# Patient Record
Sex: FEMALE | Race: WHITE | Employment: OTHER | URBAN - METROPOLITAN AREA
[De-identification: names, ages, dates, MRNs, and addresses within clinical notes are randomized per-mention and may not be internally consistent; named-entity substitution may affect disease eponyms.]

---

## 2017-02-07 ENCOUNTER — ALLIED HEALTH/NURSE VISIT (OUTPATIENT)
Dept: CARDIOLOGY | Facility: CLINIC | Age: 82
End: 2017-02-07
Payer: COMMERCIAL

## 2017-02-07 DIAGNOSIS — Z95.0 CARDIAC PACEMAKER IN SITU: Primary | ICD-10-CM

## 2017-02-07 PROCEDURE — 93293 PM PHONE R-STRIP DEVICE EVAL: CPT | Performed by: INTERNAL MEDICINE

## 2017-02-07 NOTE — PROGRESS NOTES
~90 day phone teletrace  Intrinsic Rhythm at time of check. Magnet response WNL.  F/U scheduled q 3 months. GISELE MerinoT

## 2017-03-03 ENCOUNTER — HOSPITAL ENCOUNTER (INPATIENT)
Facility: CLINIC | Age: 82
LOS: 3 days | Discharge: NURSING, REHAB OR LONG-TERM CARE | DRG: 388 | End: 2017-03-06
Attending: EMERGENCY MEDICINE | Admitting: ORTHOPAEDIC SURGERY
Payer: COMMERCIAL

## 2017-03-03 ENCOUNTER — APPOINTMENT (OUTPATIENT)
Dept: CARDIOLOGY | Facility: CLINIC | Age: 82
DRG: 388 | End: 2017-03-03
Attending: EMERGENCY MEDICINE
Payer: COMMERCIAL

## 2017-03-03 ENCOUNTER — APPOINTMENT (OUTPATIENT)
Dept: CT IMAGING | Facility: CLINIC | Age: 82
DRG: 388 | End: 2017-03-03
Attending: EMERGENCY MEDICINE
Payer: COMMERCIAL

## 2017-03-03 DIAGNOSIS — I10 HYPERTENSION GOAL BP (BLOOD PRESSURE) < 140/90: ICD-10-CM

## 2017-03-03 DIAGNOSIS — K63.89 COLON DISTENTION: ICD-10-CM

## 2017-03-03 DIAGNOSIS — R41.0 DELIRIUM: ICD-10-CM

## 2017-03-03 DIAGNOSIS — K59.01 SLOW TRANSIT CONSTIPATION: ICD-10-CM

## 2017-03-03 DIAGNOSIS — K56.7 ILEUS (H): ICD-10-CM

## 2017-03-03 DIAGNOSIS — S06.4XAA EPIDURAL HEMATOMA (H): ICD-10-CM

## 2017-03-03 DIAGNOSIS — K59.00 CONSTIPATION: ICD-10-CM

## 2017-03-03 DIAGNOSIS — Z95.0 CARDIAC PACEMAKER IN SITU: Primary | ICD-10-CM

## 2017-03-03 DIAGNOSIS — N39.0 URINARY TRACT INFECTION WITHOUT HEMATURIA, SITE UNSPECIFIED: ICD-10-CM

## 2017-03-03 LAB
ALBUMIN SERPL-MCNC: 3.1 G/DL (ref 3.4–5)
ALBUMIN UR-MCNC: 100 MG/DL
ALP SERPL-CCNC: 88 U/L (ref 40–150)
ALT SERPL W P-5'-P-CCNC: 7 U/L (ref 0–50)
AMMONIA PLAS-SCNC: 19 UMOL/L (ref 10–50)
AMMONIA PLAS-SCNC: NORMAL UMOL/L (ref 10–50)
ANION GAP SERPL CALCULATED.3IONS-SCNC: 12 MMOL/L (ref 3–14)
APPEARANCE UR: CLEAR
AST SERPL W P-5'-P-CCNC: 13 U/L (ref 0–45)
AST SERPL W P-5'-P-CCNC: ABNORMAL U/L (ref 0–45)
BACTERIA #/AREA URNS HPF: ABNORMAL /HPF
BASOPHILS # BLD AUTO: 0 10E9/L (ref 0–0.2)
BASOPHILS NFR BLD AUTO: 0.1 %
BILIRUB SERPL-MCNC: 0.6 MG/DL (ref 0.2–1.3)
BILIRUB UR QL STRIP: NEGATIVE
BUN SERPL-MCNC: 16 MG/DL (ref 7–30)
C DIFF TOX B STL QL: NORMAL
CALCIUM SERPL-MCNC: 8.4 MG/DL (ref 8.5–10.1)
CHLORIDE SERPL-SCNC: 103 MMOL/L (ref 94–109)
CO2 BLDCOV-SCNC: 25 MMOL/L (ref 21–28)
CO2 SERPL-SCNC: 24 MMOL/L (ref 20–32)
COLOR UR AUTO: YELLOW
CREAT BLD-MCNC: 0.5 MG/DL (ref 0.52–1.04)
CREAT SERPL-MCNC: 0.51 MG/DL (ref 0.52–1.04)
DIFFERENTIAL METHOD BLD: ABNORMAL
EOSINOPHIL # BLD AUTO: 0 10E9/L (ref 0–0.7)
EOSINOPHIL NFR BLD AUTO: 0 %
ERYTHROCYTE [DISTWIDTH] IN BLOOD BY AUTOMATED COUNT: 14.6 % (ref 10–15)
GFR SERPL CREATININE-BSD FRML MDRD: >90 ML/MIN/1.7M2
GFR SERPL CREATININE-BSD FRML MDRD: ABNORMAL ML/MIN/1.7M2
GLUCOSE SERPL-MCNC: 146 MG/DL (ref 70–99)
GLUCOSE UR STRIP-MCNC: NEGATIVE MG/DL
HCT VFR BLD AUTO: 45.5 % (ref 35–47)
HGB BLD-MCNC: 14.7 G/DL (ref 11.7–15.7)
HGB UR QL STRIP: NEGATIVE
IMM GRANULOCYTES # BLD: 0.1 10E9/L (ref 0–0.4)
IMM GRANULOCYTES NFR BLD: 0.4 %
INR PPP: 1.11 (ref 0.86–1.14)
KETONES UR STRIP-MCNC: 5 MG/DL
LACTATE BLD-SCNC: 1.7 MMOL/L (ref 0.7–2.1)
LACTATE BLD-SCNC: 2.5 MMOL/L (ref 0.7–2.1)
LEUKOCYTE ESTERASE UR QL STRIP: ABNORMAL
LIPASE SERPL-CCNC: 55 U/L (ref 73–393)
LYMPHOCYTES # BLD AUTO: 1 10E9/L (ref 0.8–5.3)
LYMPHOCYTES NFR BLD AUTO: 8.5 %
MCH RBC QN AUTO: 31.5 PG (ref 26.5–33)
MCHC RBC AUTO-ENTMCNC: 32.3 G/DL (ref 31.5–36.5)
MCV RBC AUTO: 98 FL (ref 78–100)
MONOCYTES # BLD AUTO: 0.3 10E9/L (ref 0–1.3)
MONOCYTES NFR BLD AUTO: 2.5 %
MUCOUS THREADS #/AREA URNS LPF: PRESENT /LPF
NEUTROPHILS # BLD AUTO: 10.6 10E9/L (ref 1.6–8.3)
NEUTROPHILS NFR BLD AUTO: 88.5 %
NITRATE UR QL: POSITIVE
NRBC # BLD AUTO: 0 10*3/UL
NRBC BLD AUTO-RTO: 0 /100
NT-PROBNP SERPL-MCNC: ABNORMAL PG/ML (ref 0–1800)
PCO2 BLDV: 33 MM HG (ref 40–50)
PH BLDV: 7.48 PH (ref 7.32–7.43)
PH UR STRIP: 5.5 PH (ref 5–7)
PLATELET # BLD AUTO: 263 10E9/L (ref 150–450)
PO2 BLDV: 53 MM HG (ref 25–47)
POTASSIUM SERPL-SCNC: 4.1 MMOL/L (ref 3.4–5.3)
POTASSIUM SERPL-SCNC: 5 MMOL/L (ref 3.4–5.3)
PROCALCITONIN SERPL-MCNC: NORMAL NG/ML
PROT SERPL-MCNC: 6.8 G/DL (ref 6.8–8.8)
RADIOLOGIST FLAGS: ABNORMAL
RBC # BLD AUTO: 4.66 10E12/L (ref 3.8–5.2)
RBC #/AREA URNS AUTO: 2 /HPF (ref 0–2)
SAO2 % BLDV FROM PO2: 90 %
SODIUM SERPL-SCNC: 140 MMOL/L (ref 133–144)
SP GR UR STRIP: >1.05 (ref 1–1.03)
SPECIMEN SOURCE: NORMAL
TROPONIN I SERPL-MCNC: 0.76 UG/L (ref 0–0.04)
TROPONIN I SERPL-MCNC: 0.93 UG/L (ref 0–0.04)
TROPONIN I SERPL-MCNC: 0.94 UG/L (ref 0–0.04)
TROPONIN I SERPL-MCNC: 0.96 UG/L (ref 0–0.04)
TSH SERPL DL<=0.05 MIU/L-ACNC: 0.83 MU/L (ref 0.4–4)
URN SPEC COLLECT METH UR: ABNORMAL
UROBILINOGEN UR STRIP-MCNC: NORMAL MG/DL (ref 0–2)
WBC # BLD AUTO: 11.9 10E9/L (ref 4–11)
WBC #/AREA URNS AUTO: 5 /HPF (ref 0–2)

## 2017-03-03 PROCEDURE — 99223 1ST HOSP IP/OBS HIGH 75: CPT | Mod: AI | Performed by: ORTHOPAEDIC SURGERY

## 2017-03-03 PROCEDURE — 25000128 H RX IP 250 OP 636: Performed by: PHYSICIAN ASSISTANT

## 2017-03-03 PROCEDURE — 82803 BLOOD GASES ANY COMBINATION: CPT

## 2017-03-03 PROCEDURE — 99285 EMERGENCY DEPT VISIT HI MDM: CPT | Mod: 25

## 2017-03-03 PROCEDURE — 82565 ASSAY OF CREATININE: CPT

## 2017-03-03 PROCEDURE — 93010 ELECTROCARDIOGRAM REPORT: CPT | Mod: Z6 | Performed by: EMERGENCY MEDICINE

## 2017-03-03 PROCEDURE — 74177 CT ABD & PELVIS W/CONTRAST: CPT

## 2017-03-03 PROCEDURE — 80053 COMPREHEN METABOLIC PANEL: CPT | Performed by: EMERGENCY MEDICINE

## 2017-03-03 PROCEDURE — 99285 EMERGENCY DEPT VISIT HI MDM: CPT | Mod: 25 | Performed by: EMERGENCY MEDICINE

## 2017-03-03 PROCEDURE — 96375 TX/PRO/DX INJ NEW DRUG ADDON: CPT

## 2017-03-03 PROCEDURE — 84484 ASSAY OF TROPONIN QUANT: CPT | Performed by: PHYSICIAN ASSISTANT

## 2017-03-03 PROCEDURE — 99207 ZZC APP CREDIT; MD BILLING SHARED VISIT: CPT | Performed by: PHYSICIAN ASSISTANT

## 2017-03-03 PROCEDURE — 83880 ASSAY OF NATRIURETIC PEPTIDE: CPT | Performed by: EMERGENCY MEDICINE

## 2017-03-03 PROCEDURE — 81001 URINALYSIS AUTO W/SCOPE: CPT | Performed by: EMERGENCY MEDICINE

## 2017-03-03 PROCEDURE — 83690 ASSAY OF LIPASE: CPT | Performed by: EMERGENCY MEDICINE

## 2017-03-03 PROCEDURE — 25500064 ZZH RX 255 OP 636: Performed by: STUDENT IN AN ORGANIZED HEALTH CARE EDUCATION/TRAINING PROGRAM

## 2017-03-03 PROCEDURE — 96361 HYDRATE IV INFUSION ADD-ON: CPT

## 2017-03-03 PROCEDURE — 84443 ASSAY THYROID STIM HORMONE: CPT | Performed by: EMERGENCY MEDICINE

## 2017-03-03 PROCEDURE — 84145 PROCALCITONIN (PCT): CPT | Performed by: EMERGENCY MEDICINE

## 2017-03-03 PROCEDURE — 93005 ELECTROCARDIOGRAM TRACING: CPT

## 2017-03-03 PROCEDURE — 71260 CT THORAX DX C+: CPT

## 2017-03-03 PROCEDURE — 87086 URINE CULTURE/COLONY COUNT: CPT | Performed by: EMERGENCY MEDICINE

## 2017-03-03 PROCEDURE — 84450 TRANSFERASE (AST) (SGOT): CPT | Performed by: EMERGENCY MEDICINE

## 2017-03-03 PROCEDURE — 87040 BLOOD CULTURE FOR BACTERIA: CPT | Performed by: EMERGENCY MEDICINE

## 2017-03-03 PROCEDURE — 84132 ASSAY OF SERUM POTASSIUM: CPT | Performed by: EMERGENCY MEDICINE

## 2017-03-03 PROCEDURE — 83605 ASSAY OF LACTIC ACID: CPT

## 2017-03-03 PROCEDURE — 99222 1ST HOSP IP/OBS MODERATE 55: CPT | Mod: 25 | Performed by: INTERNAL MEDICINE

## 2017-03-03 PROCEDURE — 84484 ASSAY OF TROPONIN QUANT: CPT | Performed by: EMERGENCY MEDICINE

## 2017-03-03 PROCEDURE — 82140 ASSAY OF AMMONIA: CPT | Performed by: EMERGENCY MEDICINE

## 2017-03-03 PROCEDURE — 25000128 H RX IP 250 OP 636: Performed by: EMERGENCY MEDICINE

## 2017-03-03 PROCEDURE — 85025 COMPLETE CBC W/AUTO DIFF WBC: CPT | Performed by: EMERGENCY MEDICINE

## 2017-03-03 PROCEDURE — 85610 PROTHROMBIN TIME: CPT | Performed by: EMERGENCY MEDICINE

## 2017-03-03 PROCEDURE — 87088 URINE BACTERIA CULTURE: CPT | Performed by: EMERGENCY MEDICINE

## 2017-03-03 PROCEDURE — 83605 ASSAY OF LACTIC ACID: CPT | Performed by: EMERGENCY MEDICINE

## 2017-03-03 PROCEDURE — 12000006 ZZH R&B IMCU INTERMEDIATE UMMC

## 2017-03-03 PROCEDURE — 25500064 ZZH RX 255 OP 636: Performed by: EMERGENCY MEDICINE

## 2017-03-03 PROCEDURE — 93306 TTE W/DOPPLER COMPLETE: CPT | Mod: 26 | Performed by: INTERNAL MEDICINE

## 2017-03-03 PROCEDURE — 40000264 ECHO COMPLETE WITH LUMASON

## 2017-03-03 PROCEDURE — 96365 THER/PROPH/DIAG IV INF INIT: CPT

## 2017-03-03 PROCEDURE — 36415 COLL VENOUS BLD VENIPUNCTURE: CPT | Performed by: PHYSICIAN ASSISTANT

## 2017-03-03 PROCEDURE — 87493 C DIFF AMPLIFIED PROBE: CPT | Performed by: EMERGENCY MEDICINE

## 2017-03-03 PROCEDURE — 87186 SC STD MICRODIL/AGAR DIL: CPT | Performed by: EMERGENCY MEDICINE

## 2017-03-03 PROCEDURE — 93010 ELECTROCARDIOGRAM REPORT: CPT | Mod: 77 | Performed by: INTERNAL MEDICINE

## 2017-03-03 PROCEDURE — 70450 CT HEAD/BRAIN W/O DYE: CPT

## 2017-03-03 RX ORDER — POTASSIUM CHLORIDE 1.5 G/1.58G
20-40 POWDER, FOR SOLUTION ORAL
Status: DISCONTINUED | OUTPATIENT
Start: 2017-03-03 | End: 2017-03-06 | Stop reason: HOSPADM

## 2017-03-03 RX ORDER — POTASSIUM CHLORIDE 29.8 MG/ML
20 INJECTION INTRAVENOUS
Status: DISCONTINUED | OUTPATIENT
Start: 2017-03-03 | End: 2017-03-06 | Stop reason: HOSPADM

## 2017-03-03 RX ORDER — ACETAMINOPHEN 500 MG
1000 TABLET ORAL 3 TIMES DAILY
Status: DISCONTINUED | OUTPATIENT
Start: 2017-03-03 | End: 2017-03-06 | Stop reason: HOSPADM

## 2017-03-03 RX ORDER — PROCHLORPERAZINE 25 MG
12.5 SUPPOSITORY, RECTAL RECTAL EVERY 12 HOURS PRN
Status: DISCONTINUED | OUTPATIENT
Start: 2017-03-03 | End: 2017-03-06 | Stop reason: HOSPADM

## 2017-03-03 RX ORDER — ONDANSETRON 4 MG/1
4 TABLET, ORALLY DISINTEGRATING ORAL EVERY 6 HOURS PRN
Status: DISCONTINUED | OUTPATIENT
Start: 2017-03-03 | End: 2017-03-06 | Stop reason: HOSPADM

## 2017-03-03 RX ORDER — IOPAMIDOL 755 MG/ML
123 INJECTION, SOLUTION INTRAVASCULAR ONCE
Status: COMPLETED | OUTPATIENT
Start: 2017-03-03 | End: 2017-03-03

## 2017-03-03 RX ORDER — POTASSIUM CHLORIDE 750 MG/1
20-40 TABLET, EXTENDED RELEASE ORAL
Status: DISCONTINUED | OUTPATIENT
Start: 2017-03-03 | End: 2017-03-06 | Stop reason: HOSPADM

## 2017-03-03 RX ORDER — PROCHLORPERAZINE MALEATE 5 MG
5 TABLET ORAL EVERY 6 HOURS PRN
Status: DISCONTINUED | OUTPATIENT
Start: 2017-03-03 | End: 2017-03-06 | Stop reason: HOSPADM

## 2017-03-03 RX ORDER — NALOXONE HYDROCHLORIDE 0.4 MG/ML
.1-.4 INJECTION, SOLUTION INTRAMUSCULAR; INTRAVENOUS; SUBCUTANEOUS
Status: DISCONTINUED | OUTPATIENT
Start: 2017-03-03 | End: 2017-03-06 | Stop reason: HOSPADM

## 2017-03-03 RX ORDER — LISINOPRIL 2.5 MG/1
2.5 TABLET ORAL 2 TIMES DAILY
Status: DISCONTINUED | OUTPATIENT
Start: 2017-03-03 | End: 2017-03-05

## 2017-03-03 RX ORDER — SODIUM CHLORIDE 9 MG/ML
1000 INJECTION, SOLUTION INTRAVENOUS CONTINUOUS
Status: DISCONTINUED | OUTPATIENT
Start: 2017-03-03 | End: 2017-03-03

## 2017-03-03 RX ORDER — CITALOPRAM HYDROBROMIDE 10 MG/1
10 TABLET ORAL DAILY
Status: DISCONTINUED | OUTPATIENT
Start: 2017-03-04 | End: 2017-03-06 | Stop reason: HOSPADM

## 2017-03-03 RX ORDER — LIDOCAINE 40 MG/G
CREAM TOPICAL
Status: DISCONTINUED | OUTPATIENT
Start: 2017-03-03 | End: 2017-03-06 | Stop reason: HOSPADM

## 2017-03-03 RX ORDER — CEFTRIAXONE 1 G/1
1 INJECTION, POWDER, FOR SOLUTION INTRAMUSCULAR; INTRAVENOUS ONCE
Status: COMPLETED | OUTPATIENT
Start: 2017-03-03 | End: 2017-03-03

## 2017-03-03 RX ORDER — ONDANSETRON 2 MG/ML
4 INJECTION INTRAMUSCULAR; INTRAVENOUS EVERY 6 HOURS PRN
Status: DISCONTINUED | OUTPATIENT
Start: 2017-03-03 | End: 2017-03-06 | Stop reason: HOSPADM

## 2017-03-03 RX ORDER — LEVOTHYROXINE SODIUM 75 UG/1
75 TABLET ORAL DAILY
Status: DISCONTINUED | OUTPATIENT
Start: 2017-03-04 | End: 2017-03-06 | Stop reason: HOSPADM

## 2017-03-03 RX ORDER — POTASSIUM CHLORIDE 750 MG/1
20 TABLET, EXTENDED RELEASE ORAL DAILY
Status: DISCONTINUED | OUTPATIENT
Start: 2017-03-04 | End: 2017-03-06 | Stop reason: HOSPADM

## 2017-03-03 RX ORDER — VIT C/E/ZN/COPPR/LUTEIN/ZEAXAN 60 MG-6 MG
1 CAPSULE ORAL DAILY
Status: DISCONTINUED | OUTPATIENT
Start: 2017-03-04 | End: 2017-03-06 | Stop reason: HOSPADM

## 2017-03-03 RX ORDER — MAGNESIUM OXIDE 400 MG/1
400 TABLET ORAL DAILY
Status: DISCONTINUED | OUTPATIENT
Start: 2017-03-04 | End: 2017-03-06 | Stop reason: HOSPADM

## 2017-03-03 RX ORDER — FUROSEMIDE 10 MG/ML
40 INJECTION INTRAMUSCULAR; INTRAVENOUS ONCE
Status: DISCONTINUED | OUTPATIENT
Start: 2017-03-03 | End: 2017-03-03

## 2017-03-03 RX ORDER — FUROSEMIDE 10 MG/ML
40 INJECTION INTRAMUSCULAR; INTRAVENOUS ONCE
Status: COMPLETED | OUTPATIENT
Start: 2017-03-03 | End: 2017-03-03

## 2017-03-03 RX ORDER — POTASSIUM CHLORIDE 7.45 MG/ML
10 INJECTION INTRAVENOUS
Status: DISCONTINUED | OUTPATIENT
Start: 2017-03-03 | End: 2017-03-06 | Stop reason: HOSPADM

## 2017-03-03 RX ORDER — ALBUTEROL SULFATE 0.83 MG/ML
1 SOLUTION RESPIRATORY (INHALATION) EVERY 6 HOURS PRN
Status: DISCONTINUED | OUTPATIENT
Start: 2017-03-03 | End: 2017-03-06 | Stop reason: HOSPADM

## 2017-03-03 RX ORDER — MAGNESIUM SULFATE HEPTAHYDRATE 40 MG/ML
4 INJECTION, SOLUTION INTRAVENOUS EVERY 4 HOURS PRN
Status: DISCONTINUED | OUTPATIENT
Start: 2017-03-03 | End: 2017-03-06 | Stop reason: HOSPADM

## 2017-03-03 RX ADMIN — FUROSEMIDE 40 MG: 10 INJECTION, SOLUTION INTRAVENOUS at 18:02

## 2017-03-03 RX ADMIN — SODIUM CHLORIDE 1000 ML: 9 INJECTION, SOLUTION INTRAVENOUS at 12:14

## 2017-03-03 RX ADMIN — SULFUR HEXAFLUORIDE 5 ML: KIT at 15:56

## 2017-03-03 RX ADMIN — IOPAMIDOL 123 ML: 755 INJECTION, SOLUTION INTRAVENOUS at 11:29

## 2017-03-03 RX ADMIN — CEFTRIAXONE 1 G: 1 INJECTION, POWDER, FOR SOLUTION INTRAMUSCULAR; INTRAVENOUS at 15:24

## 2017-03-03 ASSESSMENT — ENCOUNTER SYMPTOMS
ABDOMINAL DISTENTION: 1
ABDOMINAL PAIN: 1

## 2017-03-03 ASSESSMENT — VISUAL ACUITY
OU: BASELINE
OU: BASELINE

## 2017-03-03 NOTE — H&P
[facility] Physical  Department of Internal Medicine    Patient Name: Ankita Pham MRN# [id number]   Age: 89 year old YOB: 1921     Date of Admission: 3/3/2017    Home clinic: [facility]   Primary care provider: Viola Mathew, Md         Assessment and Plan:   Ankita Pham is a 89 year old female with a past medical history of IBS, chronic lymphedema, chronic pain, atrial fibrillation s/p PPM, PUD, PE, epidural hematoma 07/2016 who is admitted to medicine for abdominal pain, AMS, CT consistent with colonic distention.     #AMS: Lethargy, altered. Lytes stable, creat stable, ammonia normal, procal <0.05, TSH WNL, glucose 146, hgb stable, platelets stable, WBC count mildly elevated. VBG consistent with respiratory alkalosis. Head CT w/o acute intracranial pathology, stable cystic lesion in right parotic gland. VSS. Likely due to underlying process as below.   -Neuro checks  -Continue to treat underlying GI, cardiology and likely infetious causes as below  -UC and BC in process  -Consider EEG, but less likely seizure activity    #Colonic distention with suspected pseudo-obstruction: CT abd/pelvis w/ circumferential thickening of the rectum, severe air distended lops of colon w/ large amt of stool in rectum concerning for early distal colonic obstruction or colonic ileus ([place]'s syndrome), no evidence of ischemia, non dilated small bowel loops. Noted atherosclerotic disease w/ severe stenosis of celiac axis, superior mesenteric arter origins, moderate stenosis of the left common iliac artery. Patient w/ chronic constipation likely 2/2 narcotics. LA mildly elevated at 2.5 on presentation which improved w/ 1 L of IVF bolus. Abdominal exam w/ normoactive bowel sounds, soft, no peritoneal signs.   -GI and CRS consulted, appreciate recs  -Monitor lytes, replace per protocol  -Cdiff in process  -Serial abdominal exams  -NPO  -NG tube placed when patient amenable, family declined restraints to place NG  understanding risk of perforation, decompensation  -Rectal tube ordered  -Turn q30 minutes  -Avoid narcotics and anticholinergics  -Consider abdominal US in AM to evaluate biliary tree    #Acute exacerbation of heart failure with reduced EF, new onset  #Elevated troponin  Echocardiogram w/ mild LV dilation, severe diffuse hypokinesis, EF of 15-20%, normal RV function, dilation of IVC w/ abnormal respiratory variation, no pericardial effusion, moderate tricuspid insufficiency, mild aortic valve sclerosis, mild mitral annular [place]. [place] of [address]. CT chest w/ elevated RH dysfunction, dilated pulmonary artery, moderate pulmonary edema and new small bilateral pleural effusions R>L. Trop 0.757 in ED, up to 0.962 on admission. Cardiology evaluated, felt likely 2/2 decompensated HF vs. Ongoing ischemia vs. Stress cardiomyopathy.   -Cardiology consulted, appreciate recs  -Diurese w/ IV lasix 40 mg x1  -Tele, continuous pulse ox  -Trops x2 tonight  -Strict Is/Os  -Daily weights  -Lisinopril 2.5 mg BID  -Continue metoprolol    #SA node dysfunction, Atrial fibrillation w/ RVR: s/p PPM placement. PTA on rate control w/ metoprolol. On aspirin for anti platelet. Chads vasc score of 5 (age, CHF, HTN, sex). Warfarin DCed in 07/2016 after an epidural hematoma. HR in 90s-1 teens.   -Cardiology consulted, appreciate recs  -Contniue rate control with metoprolol for now  -Diurese w/ 40 mg iv IF lasix per cards  -Tele  -HIV and TSH ordered    #Abnormal UA: UA w/ 5 ketones, elevated specific gravity, 100 protein, positive nitrite and LE, microscopy w/ 5 WBC, few bacteria. Past cultures in 07/2016, 08/2014, 12/2009 w/ Ecoli S to rocephin. Afebrile, WBC count mildly elevated but procal negative, WBC could be reactive to acute process. S/P 1 g of rocephin and placement of larson in ED.   -Follow UC  -Will not give additional antibiotics at this time    #Chronic PE: Not on AC 2/2 recent epidural hematoma. No new emboli noted.  -Continue  "conservative managment      #Hypothyroidism: On synthroid PTA. TSH WNL.   -Continue current dose    #Chronic pain: On gabapentin 200 qhs, oxycodone 5 mg TID scheduled w/ 5-10 mg q4 PRN for moderate to severe pain PTA. Bowel regimen w/ miralax BID, senna-colace 2 tabs BID, PRN lactulose, dulcolax suppositories. Unclear when last BM was.   -Hold narcotics w/ AMS and colonic distention  -WIll hold bowel regimen for now, consider addition per GI recs     CODE: DNR/DNI- confirmed with son and DIL that this is patients wish, when asked directly patient noted that she would not want surgery- son prefers us to call him in if any clinical changes or any concerns arise.  FEN: NPO, gentle IVF boluses PRN  DVT: Mechanical, SCDs  LINES: Right PIV  Disposition/Admission Status: >2 midnights for ogilvies.     Plan of care was discussed with attending physician, Dr. Lewis.     Carolyn Larry PA-C  Hospitalist Service    10 point ROS is negative except as mentioned in the HPI  PMH, PSH, medications, SH, and FMH were reviewed and confirmed by myself    Labs and VS reviewed    BP (!) 127/91 (BP Location: Left arm)  Pulse 115  Temp 97.4  F (36.3  C) (Axillary)  Resp 18  Ht 1.651 m (5' 5\")  Wt 91.3 kg (201 lb 3.2 oz)  SpO2 94%  Breastfeeding? No  BMI 33.48 kg/m2  Elderly woman, ill-appearing, clearly uncomfortable  Marked abdominal distension. Seen bedside with surgery and GI, rectal exam with lots of flatulence and liquid stool    I saw and evaluated this patient with RADHA Larry. I agree with his/her assessment of Mrs Pham. 89 yo woman with multiple chronic medical problems who presented to the ED today after she was noted this AM to have altered mental status and abdominal distension. She has a prior history of colonic pseudo-obstruction and her symptoms seem fairly consistent with this diagnosis presently. She is largely immobile and on opiods. Appears to be self-decompressing, no need for emergent flex sig. Will place rectal " tube, serial abdominal exams, and turn frequently. Interestingly noted to have rather pronounced new onset heart failure with mild hypoxic respiratory failure. Seen by cardiology, appreciate assistance. Plan or cautious diuresis and beta-blocker therapy. Will star low dose ACE. Etiology here uncertain, given age and co morbidities, hopefully we can manage conservatively.     Damir Lewis MD             Chief Complaint:   Abdominal pain         HPI:   History is obtained from the family, NH documentation and medical record.     The patient is unable to tell history. She denies abdominal pain when asking, but then when palpating did yell out in pain.    NH documentation note she was off this AM, complained of abdominal pain and distention. EMS was called to to [facility].     Her daughter in law notes that she has issues with chronic constipation. She notes the patient is on narcotics. She has been immobile and bedbound for the last few years, refusing to get out of bed. She has wounds 2/2 this.     Son notes that his mother is usually awake, somewhat sleepy but enjoys activities such as reading the [place], using the Internet and watching TV. He believes she is quite functional for her age.          Review of Systems:   A 10 point ROS was performed and negative unless otherwise noted in HPI.          Past Medical History:   Reviewed and updated in Epic.   Past Medical History   Diagnosis Date     Acute, but ill-defined, cerebrovascular disease 1975     incontinent     Cardiac dysrhythmia, unspecified      sees Dr. Marcus     Cardiac pacemaker in situ 1999     Chronic peptic ulcer, unspecified site, without mention of hemorrhage, perforation, or obstruction      Diaphragmatic hernia without mention of obstruction or gangrene      Edema      Esophageal reflux 12/13/2006     Essential hypertension, benign      Heart disease, unspecified      Irritable bowel syndrome      Lymphedema      Myalgia and myositis, unspecified       Open wound of heel 3/31/2014     Right heel.     Osteoarthrosis, unspecified whether generalized or localized, unspecified site      Dr. Ashly Marrero     Other abnormal glucose 2005     Other chronic pulmonary heart diseases      Rotator cuff (capsule) sprain      left, Dr. Paredes didn't think surgery worth risks     Unspecified hypothyroidism      Unspecified hypothyroidism      Unspecified sleep apnea      Urinary incontinence 8/1/2014            Past Surgical History:   Reviewed and updated in Epic.   Past Surgical History   Procedure Laterality Date     Surgical history of -        Ankle fracture Tib     Surgical history of -        TIA'a     Surgical history of -        Thyroiditis x3 remote     Surgical history of -        Ulnar nerve transpostion     Surgical history of -   1999     Pacemaker (sinus phases, bradycardia)     Surgical history of -        L atrial pacer     Surgical history of -        appy     Surgical history of -        Benign breast biopsy     Endoscopic release carpal tunnel  10/26/2011     Procedure:ENDOSCOPIC RELEASE CARPAL TUNNEL; RIGHT ENDOSCOPIC CARPAL TUNNEL RELEASE, RIGHT LONG AND RING A-1 JAMIL RELEASE; Surgeon:KATE MARRERO; Location: [facility]     Release trigger finger  10/26/2011     Procedure:RELEASE TRIGGER FINGER; Surgeon:KATE MARRERO; Location: [facility]     Incision and drainage sacral wound, combined N/A 8/28/2014     Procedure: COMBINED INCISION AND DRAINAGE SACRAL WOUND;  Surgeon: Osorio Morgan MD;  Location: [facility] OR            Social History:   Reviewed and updated in [facility].   Social History     Social History     Marital status:      Spouse name: N/A     Number of children: N/A     Years of education: N/A     Occupational History     Not on file.     Social History Main Topics     Smoking status: Former Smoker     Years: 10.00     Quit date: 5/1/1966     Smokeless tobacco: Never Used     Alcohol use 28.0 oz/week      Comment:  "4 oz of wine  each evening     Drug use: No     Sexual activity: Not Currently     Partners: Male     Other Topics Concern     Parent/Sibling W/ Cabg, Mi Or Angioplasty Before 65f 55m? No     Social History Narrative    Daughter in law does grocery shopping    Son helps her around the home if needed.    One daughter in [place], [place], [place] - one daughter has passed away.   Lives in nursing home. Non ambulatory for ~2 years per family.          Family History:   Reviewed and updated in Epic.   Family History   Problem Relation Age of Onset     Family History Negative Mother      HEART DISEASE Father      HEART DISEASE Sister      CHF     HEART DISEASE Brother      multiple MI     Respiratory Brother      COPD     HEART DISEASE Brother      [place]     HEART DISEASE Brother       during open heart surgery     DIABETES No family hx of      Hypertension No family hx of             Allergies:      Allergies   Allergen Reactions     Cortisone Other (See Comments)     Mental changes      Nuts Anaphylaxis            Medications:     (Not in a hospital admission)          Physical Exam:   Blood pressure 110/67, pulse 115, temperature 97.6  F (36.4  C), temperature source Axillary, resp. rate 12, height 1.651 m (5' 5\"), weight 91.3 kg (201 lb 3.2 oz), SpO2 99 %, not currently breastfeeding.  GENERAL: Alerts to voice, oriented to DIL, not oriented to time, place.   HEENT: Anicteric sclera. Mucous membranes dry and without lesions.   CV: Irregularly irregular. S1, S2. No murmurs appreciated.   RESPIRATORY: Effort normal on 2 LPM via NC. Lungs CTAB with no wheezing, rales, rhonchi.   GI: Abdomen soft and distended, bowel sounds hypoactive. Diffuse tenderness to palpation, no rebound, guarding.   MUSCULOSKELETAL: No joint swelling or tenderness. Moves all extremities.   NEUROLOGICAL: No focal deficits.   EXTREMITIES: 2+ bilateral LE edema. Intact bilateral pedal pulses.   : [place] in place.   SKIN: No jaundice. No rashes. "     Lines/Tubes/Drains:   Peripheral IV 07/14/16 Left Hand (Active)   Number of days:232       Peripheral IV 03/03/17 Right Upper forearm (Active)   Number of days:0       Peripheral IV 03/03/17 Left Lower forearm (Active)   Number of days:0            Data:   I personally reviewed the following studies:  CMP, lipase, procalcitonin, lactic acid, troponin, BNP, glucose, VBG, CBC, INR  Unresulted Labs Ordered in the Past 30 Days of this Admission     Date and Time Order Name Status Description    3/3/2017 1328 Urine Culture Aerobic Bacterial Preliminary     3/3/2017 1109 Blood culture Preliminary     3/3/2017 1109 Blood culture Preliminary         ECHO 03/03  Interpretation Summary  Mild left ventricular dilation is present.  The Ejection Fraction is estimated at 15-20%.  Right ventricular function, chamber size, wall motion, and thickness are  normal.  Dilation of the inferior vena cava is present with abnormal respiratory  variation in diameter.  No pericardial effusion is present.    CT Abd/pelvis 03/03  IMPRESSION:   1. 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 No findings suggestive of  ischemia. Non dilated small bowel loops.   a. Circumferential thickening of the rectum, could represent colitis.  Recommend clinical correlation and possible additional evaluation of  rectum.   2. Unchanged severe common bile duct dilatation with mildly improved  intrahepatic biliary duct dilatation. Possible trace amount of  pneumobilia near the ampulla of Vater. Cannot exclude an obstructing  biliary or pancreatic head mass. This could be further evaluated with  MRCP or ERCP.   3. 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  4. Large sliding-type hiatal hernia.  5. Please see CT  pulmonary angiogram from the same day for details  regarding the chest.      [Urgent Result: Rectal stool with air distended colon]    CT chest PE 03/03      IMPRESSION:   1. No acute pulmonary embolism. 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  2. Evidence of heart failure with elevated right heart dysfunction,  dilated pulmonary artery and moderate pulmonary edema and new small  bilateral pleural effusions right greater than left.

## 2017-03-03 NOTE — ED NOTES
"Ankita comes in by ambulance for altered mental status and abdominal pain and distention. She does respond to speech and looks at me. When asked what year it is she tells me, \"February.\" She does give me her height appropriately.  "

## 2017-03-03 NOTE — CONSULTS
GASTROENTEROLOGY CONSULTATION      Date of Admission:  3/3/2017          Chief Complaint:   We were asked by Mannie Lovell to evaluate this patient with abdominal pain and distended bowels seen on CT.            ASSESSMENT AND RECOMMENDATIONS:   Assessment:  Ankita Pham is a 89 year old female with a history of PE on 325mg Aspirin, A. Fib with ventricular pacemaker, sick sinus syndrome, pulmonary hypertension, epidural hematoma, hypothyroidism, constipation, HTN, lymphedema, chronic PUD and incontinence who come in with altered mental status, abdominal pain and distension. Patient is afebrile. On admission potassium was 5.0, and WBC was 11.9.     Abdominal/pelvis CT on 3/3/17 showed severely distended loops of colon with large amount of stool in the rectum and since there was no rectal mass noted with rectal exam, patient might had constipation first that resulted pseudo-obstruction. There was unchanged severe common bile duct dilatation with mildly improved intrahepatic biliary duct dilatation when compared to the CT scan completed on 2/24/15.  Plan is conservative management and monitor patient closely, if no improvement in next 24-48 hours, we will consider evaluating patient with flex sigmoidoscopy.      Recommendations  - BMP, CBC, Lactate  - Stabilizing electrolytes (potassium around 4.0, Mg 2.0 and calcium 8-10)  - C. diff stool testing, if positive please treat with 125mg of oral Vancomycin QID x14 days  - Serial abdominal exams  - NPO, MIVF  - Stat NG tube placement for decompression - to intermittent suction on wall   - Red rubber rectal tube for decompression  - Rotate patient in bed frequently as patient has impaired mobility  - Avoid all anticholinergics and narcotics as able, at the very least minimize.  - We will consider enema tomorrow  - Could consider abdominal ultrasound to evaluate biliary dilation although her LFTs are normal    Gastroenterology follow up recommendations: To be  determined    Patient care plan discussed with Dr. Clayton, GI staff physician. Thank you for involving us in this patient's care. Please do not hesitate to contact the GI service with any questions or concerns.     Ambar Garcia CNP  Department of Gastroenterology   -------------------------------------------------------------------------------------------------------------------   History is obtained from the patient and the medical record.          History of Present Illness:   Ankita Pham is a 89 year old female with a history of PE on 325mg Aspirin, A. Fib with ventricular pacemaker, sick sinus syndrome, pulmonary hypertension, epidural hematoma, hypothyroidism, constipation, HTN, lymphedema, chronic PUD and incontinence who come in with abdominal pain and distension. Patient is afebrile. On admission potassium was 5.0, and WBC was 11.9.  Abdominal/pelvis CT on 3/3/17 showed severely distended loops of colon with large amount of stool in the rectum and since there was no rectal mass noted with rectal exam, patient might had constipation first that resulted pseudo-obstruction.  There was unchanged severe common bile duct dilatation with mildly improved intrahepatic biliary duct dilatation when compared to the CT scan completed on 2/24/15.    Patient living in care center facility and she does have impaired mobility. She has a recurrent constipation, which she receives oral bowel regimen for it. She did have small stool today.        Past Medical History:   Reviewed and edited as appropriate  Past Medical History   Diagnosis Date     Acute, but ill-defined, cerebrovascular disease 1975     incontinent     Cardiac dysrhythmia, unspecified      sees Dr. Marcus     Cardiac pacemaker in situ 1999     Chronic peptic ulcer, unspecified site, without mention of hemorrhage, perforation, or obstruction      Diaphragmatic hernia without mention of obstruction or gangrene      Edema      Esophageal reflux 12/13/2006      Essential hypertension, benign      Heart disease, unspecified      Irritable bowel syndrome      Lymphedema      Myalgia and myositis, unspecified      Open wound of heel 3/31/2014     Right heel.     Osteoarthrosis, unspecified whether generalized or localized, unspecified site      Dr. Ashly Marrero     Other abnormal glucose 2005     Other chronic pulmonary heart diseases      Rotator cuff (capsule) sprain      left, Dr. Paredes didn't think surgery worth risks     Unspecified hypothyroidism      Unspecified hypothyroidism      Unspecified sleep apnea      Urinary incontinence 8/1/2014            Past Surgical History:   Reviewed and edited as appropriate   Past Surgical History   Procedure Laterality Date     Surgical history of -        Ankle fracture Tib     Surgical history of -        TIA'a     Surgical history of -        Thyroiditis x3 remote     Surgical history of -        Ulnar nerve transpostion     Surgical history of -   1999     Pacemaker (sinus phases, bradycardia)     Surgical history of -        L atrial pacer     Surgical history of -        appy     Surgical history of -        Benign breast biopsy     Endoscopic release carpal tunnel  10/26/2011     Procedure:ENDOSCOPIC RELEASE CARPAL TUNNEL; RIGHT ENDOSCOPIC CARPAL TUNNEL RELEASE, RIGHT LONG AND RING A-1 JAMIL RELEASE; Surgeon:KATE MARRERO; Location:[facility]     Release trigger finger  10/26/2011     Procedure:RELEASE TRIGGER FINGER; Surgeon:KATE MARRERO; Location:[facility]     Incision and drainage sacral wound, combined N/A 8/28/2014     Procedure: COMBINED INCISION AND DRAINAGE SACRAL WOUND;  Surgeon: Osorio Morgan MD;  Location: [facility]            Previous Endoscopy:   No results found for this or any previous visit.         Social History:   Reviewed and edited as appropriate  Social History     Social History     Marital status:      Spouse name: N/A     Number of children: N/A     Years of  education: N/A     Occupational History     Not on file.     Social History Main Topics     Smoking status: Former Smoker     Years: 10.00     Quit date: 1966     Smokeless tobacco: Never Used     Alcohol use 28.0 oz/week      Comment: 4 oz of wine  each evening     Drug use: No     Sexual activity: Not Currently     Partners: Male     Other Topics Concern     Parent/Sibling W/ Cabg, Mi Or Angioplasty Before 65f 55m? No     Social History Narrative    Daughter in law does grocery shopping    Son helps her around the home if needed.    One daughter in [place], [place], [place] - one daughter has passed away.            Family History:   Reviewed and edited as appropriate  Family History   Problem Relation Age of Onset     Family History Negative Mother      HEART DISEASE Father      HEART DISEASE Sister      CHF     HEART DISEASE Brother      multiple MI     Respiratory Brother      COPD     HEART DISEASE Brother      MI     HEART DISEASE Brother       during open heart surgery     DIABETES No family hx of      Hypertension No family hx of            Allergies:   Reviewed and edited as appropriate     Allergies   Allergen Reactions     Cortisone Other (See Comments)     Mental changes      Nuts Anaphylaxis            Medications:     Current Facility-Administered Medications   Medication     0.9% sodium chloride infusion     Current Outpatient Prescriptions   Medication Sig     OXYCODONE HCL PO Take 5 mg by mouth 3 times daily     METOPROLOL SUCCINATE ER PO Take 12.5 mg by mouth At Bedtime     SIMETHICONE-80 PO Take 80 mg by mouth 2 times daily as needed for intestinal gas in addition to scheduled doses     oxyCODONE (ROXICODONE) 5 MG immediate release tablet Take 1-2 tablets (5-10 mg) by mouth every 4 hours as needed for moderate to severe pain     aspirin  MG tablet Take 325 mg by mouth daily      Gabapentin (NEURONTIN PO) Take 200 mg by mouth daily     Citalopram Hydrobromide (CELEXA PO) Take 10 mg by mouth  "daily     lactulose (CHRONULAC) 10 GM/15ML solution Take 30 mLs by mouth daily as needed for constipation     LORAZEPAM PO Take 0.25 mg by mouth daily as needed for anxiety     acetaminophen (TYLENOL) 500 MG tablet Take 2 tablets (1,000 mg) by mouth 3 times daily     magnesium oxide (MAG-OX) 400 MG tablet Take 1 tablet (400 mg) by mouth daily     albuterol (2.5 MG/3ML) 0.083% nebulizer solution Take 1 vial (2.5 mg) by nebulization every 6 hours as needed for shortness of breath / dyspnea or wheezing     bisacodyl (DULCOLAX) 10 MG suppository Place 1 suppository (10 mg) rectally daily as needed for constipation     polyethylene glycol (MIRALAX/GLYCOLAX) packet Take 17 g by mouth 2 times daily     senna-docusate (SENOKOT-S;PERICOLACE) 8.6-50 MG per tablet Take 2 tablets by mouth 2 times daily     omeprazole 20 MG tablet Take 1 tablet (20 mg) by mouth 2 times daily     potassium chloride SA (K-DUR,KLOR-CON M) 20 MEQ tablet Take 1 tablet (20 mEq) by mouth daily     SIMETHICONE-80 PO Take 80 mg by mouth 3 times daily     Ondansetron HCl (ZOFRAN PO) Take 4 mg by mouth every 6 hours as needed for nausea or vomiting     multivitamin  with lutein (OCUVITE WITH LTEIN) CAPS Take 1 capsule by mouth daily     hypromellose (ARTIFICIAL TEARS) 0.5 % SOLN Place 1 drop into both eyes 3 times daily     levothyroxine (SYNTHROID, LEVOTHROID) 75 MCG tablet Take 1 tablet (75 mcg) by mouth daily     ascorbic acid 500 MG TABS Take 1 tablet (500 mg) by mouth daily             Review of Systems:   A complete review of systems was performed and is negative except as noted in the HPI           Physical Exam:   BP (!) 124/99  Pulse 115  Temp 97.6  F (36.4  C) (Axillary)  Resp 9  Ht 1.651 m (5' 5\")  Wt 91.3 kg (201 lb 3.2 oz)  SpO2 95%  Breastfeeding? No  BMI 33.48 kg/m2  Wt:   Wt Readings from Last 2 Encounters:   03/03/17 91.3 kg (201 lb 3.2 oz)   07/15/16 84.3 kg (185 lb 13.6 oz)      Constitutional: Altered mental status. Patient moans with " abdominal discomfort but not dyspneic/diaphoretic, no acute distress  Eyes: Sclera anicteric/injected  Ears/nose/mouth/throat: Normal oropharynx without ulcers or exudate, mucus membranes moist, hearing intact  Neck: supple, thyroid normal size  CV: RRR. Lymphedema in LE  Respiratory: Unlabored breathing. Diminished lung sound bilaterally  Lymph: No axillary, submandibular, supraclavicular or inguinal lymphadenopathy  Abd: Nondistended, +bs, no hepatosplenomegaly, no peritoneal signs. Generalized tenderness.   Skin: warm, perfused, no jaundice  Neuro: AAO x 3, No asterixis  Psych: Normal affect  MSK: Normal gait         Data:   Labs and imaging below were independently reviewed and interpreted    BMP  Recent Labs  Lab 03/03/17  1248 03/03/17  1103   NA  --  140   POTASSIUM 4.1 5.0   CHLORIDE  --  103   OMERO  --  8.4*   CO2  --  24   BUN  --  16   CR  --  0.51*   GLC  --  146*     CBC  Recent Labs  Lab 03/03/17  1103   WBC 11.9*   RBC 4.66   HGB 14.7   HCT 45.5   MCV 98   MCH 31.5   MCHC 32.3   RDW 14.6        INR  Recent Labs  Lab 03/03/17  1103   INR 1.11     LFTs  Recent Labs  Lab 03/03/17  1248 03/03/17  1103   ALKPHOS  --  88   AST 13 Unsatisfactory specimen - hemolyzedNOTIFIED CARLOS KENYON @1220 03.03.17 [facility]. BY EK   ALT  --  7   BILITOTAL  --  0.6   PROTTOTAL  --  6.8   ALBUMIN  --  3.1*      PANC  Recent Labs  Lab 03/03/17  1103   LIPASE 55*

## 2017-03-03 NOTE — ED NOTES
GI order NG tube placement, writer and 2nd RN attempted x3, pt unable to follow swallowing commands well, became agitated when tube was placed, pulled out x2, daughter at bedside and pt decided for no NG placement at this time, GI aware

## 2017-03-03 NOTE — IP AVS SNAPSHOT
[address] [address]    Phone:  [phone]                                       After Visit Summary   3/3/2017    Ankita Pham    MRN: [id number]           After Visit Summary Signature Page     I have received my discharge instructions, and my questions have been answered. I have discussed any challenges I see with this plan with the nurse or doctor.    ..........................................................................................................................................  Patient/Patient Representative Signature      ..........................................................................................................................................  Patient Representative Print Name and Relationship to Patient    ..................................................               ................................................  Date                                            Time    ..........................................................................................................................................  Reviewed by Signature/Title    ...................................................              ..............................................  Date                                                            Time

## 2017-03-03 NOTE — PHARMACY-ADMISSION MEDICATION HISTORY
Admission medication history interview status for the 3/3/2017 admission is complete. See Epic admission navigator for allergy information, pharmacy, prior to admission medications and immunization status.     Medication history interview sources: med schedule and nurse from [facility] [phone]    Changes made to PTA medication list (reason)  Added:   - PRN simethicone order (per med schedule)  - scheduled oxycodone order (per med schedule)    Deleted:   - furosemide (not on med schedule)  - ketoprofen (not on med schedule)  - Eucerin cream (not on med schedule)    Changed:   - metoprolol to metoprolol succinate 12.5 mg QD (was tartrate 25 mg BID - per med schedule)  - removed note on magnesium - patient now takes as prescribed (note says patient takes differently - per med schedule)  - removed note on omeprazole - patient now takes as prescribed (note says patient takes differently - per med schedule)    Additional medication history information (including reliability of information, actions taken by pharmacist):  - Med schedule was obtained from [facility] [phone] (not actual MAR). Nurse from [facility] confirmed that the following medications were given this morning: senna-docusate, artificial tears, simethicone, ascorbic acid, levothyroxine, multivitamin, acetaminophen, potassium, omeprazole, magnesium, citalopram, Miralax, aspirin, and oxycodone.      Prior to Admission medications    Medication Sig Last Dose Taking? Auth Provider   OXYCODONE HCL PO Take 5 mg by mouth 3 times daily 3/3/2017 at AM Yes Unknown, Entered By History   METOPROLOL SUCCINATE ER PO Take 12.5 mg by mouth At Bedtime 3/2/2017 at HS Yes Unknown, Entered By History   SIMETHICONE-80 PO Take 80 mg by mouth 2 times daily as needed for intestinal gas in addition to scheduled doses PRN Yes Unknown, Entered By History   oxyCODONE (ROXICODONE) 5 MG immediate release tablet Take 1-2 tablets (5-10  mg) by mouth every 4 hours as needed for moderate to severe pain PRN Yes Benito Muro MD   aspirin  MG tablet Take 325 mg by mouth daily  3/3/2017 at AM Yes Benito Muro MD   Gabapentin (NEURONTIN PO) Take 200 mg by mouth daily 3/2/2017 at HS Yes Reported, Patient   Citalopram Hydrobromide (CELEXA PO) Take 10 mg by mouth daily 3/3/2017 at AM Yes Reported, Patient   lactulose (CHRONULAC) 10 GM/15ML solution Take 30 mLs by mouth daily as needed for constipation PRN Yes Reported, Patient   LORAZEPAM PO Take 0.25 mg by mouth daily as needed for anxiety PRN Yes Reported, Patient   acetaminophen (TYLENOL) 500 MG tablet Take 2 tablets (1,000 mg) by mouth 3 times daily 3/3/2017 at AM Yes William Landrum MD   magnesium oxide (MAG-OX) 400 MG tablet Take 1 tablet (400 mg) by mouth daily 3/3/2017 at AM Yes William Landrum MD   albuterol (2.5 MG/3ML) 0.083% nebulizer solution Take 1 vial (2.5 mg) by nebulization every 6 hours as needed for shortness of breath / dyspnea or wheezing PRN Yes William Landrum MD   bisacodyl (DULCOLAX) 10 MG suppository Place 1 suppository (10 mg) rectally daily as needed for constipation PRN Yes William Landrum MD   polyethylene glycol (MIRALAX/GLYCOLAX) packet Take 17 g by mouth 2 times daily 3/3/2017 at AM Yes William Landrum MD   senna-docusate (SENOKOT-S;PERICOLACE) 8.6-50 MG per tablet Take 2 tablets by mouth 2 times daily 3/3/2017 at AM Yes William Landrum MD   omeprazole 20 MG tablet Take 1 tablet (20 mg) by mouth 2 times daily 3/3/2017 at AM Yes William Landrum MD   potassium chloride SA (K-DUR,KLOR-CON M) 20 MEQ tablet Take 1 tablet (20 mEq) by mouth daily 3/3/2017 at AM Yes William Landrum MD   SIMETHICONE-80 PO Take 80 mg by mouth 3 times daily 3/3/2017 at AM Yes Unknown, Entered By History   Ondansetron HCl (ZOFRAN PO) Take 4 mg by mouth every 6 hours as needed for nausea or vomiting PRN Yes Unknown, Entered By History   multivitamin  with lutein (OCUVITE  WITH LTEIN) CAPS Take 1 capsule by mouth daily 3/3/2017 at AM Yes Martell Marie MD   hypromellose (ARTIFICIAL TEARS) 0.5 % SOLN Place 1 drop into both eyes 3 times daily 3/3/2017 at AM Yes Martell Marie MD   levothyroxine (SYNTHROID, LEVOTHROID) 75 MCG tablet Take 1 tablet (75 mcg) by mouth daily 3/3/2017 at AM Yes Martell Marie MD   ascorbic acid 500 MG TABS Take 1 tablet (500 mg) by mouth daily 3/3/2017 at AM Yes Martell Marie MD         Medication history completed by: Cristal Fragoso, PharmD  PGY-1 Pharmacy Resident

## 2017-03-03 NOTE — CONSULTS
Reason for consultation:  Elevated troponin and BNP.    HPI:  Ms Pham is a 89 yo female with atrial fibrillation, sinus node dysfunction and AV block s/p PPM 1999 with generator exchange in 2007, epidural hematoma in 7/2016 in the setting of supratherapeutic INR, history of pulmonary embolism, systemic and pulmonary hypertension, and obstructive sleep apnea, who was brought to our emergency department from her nursing home because of altered mental status.  She reportedly complained of abdominal pain and distention to paramedics and the emergency department staff.  To me her only complaint was right knee pain.  She denies dyspnea and chest pain currently and previously.  She is not feeling lightheaded.    Troponin is 0.757 today.  It was 0.016 in 7/2016 when she presented with her epidural hemorrhage.  BNP is 20,000.    Her last echocardiogram in our medical record was in 2005 and showed normal LV size, mild concentric hypertrophy, LVEF 55% with apical wall motion abnormality, mildly dilated RV with mildly depressed function, moderate biatrial enlargement, mild MR and no AS.  A repeat echocardiogram was performed today and has yet to be read but on my cursory review of images acquired at the bedside her LVEF has fallen significantly, to ~15 or 20%.    Review of systems could not be performed because she was too uncomfortable to participate with a thorough history.    Per her daughter-in-law, who accompanied her today, the patient is cognitively intact at baseline and enjoys performing the [employer] crossword puzzle.      Past Medical History   Diagnosis Date     Acute, but ill-defined, cerebrovascular disease 1975     incontinent     Cardiac dysrhythmia, unspecified      sees Dr. Marcus     Cardiac pacemaker in situ 1999     Chronic peptic ulcer, unspecified site, without mention of hemorrhage, perforation, or obstruction      Diaphragmatic hernia without mention of obstruction or gangrene      Edema      Esophageal  reflux 2006     Essential hypertension, benign      Heart disease, unspecified      Irritable bowel syndrome      Lymphedema      Myalgia and myositis, unspecified      Open wound of heel 3/31/2014     Right heel.     Osteoarthrosis, unspecified whether generalized or localized, unspecified site      Dr. Ashly Marrero     Other abnormal glucose      Other chronic pulmonary heart diseases      Rotator cuff (capsule) sprain      left, Dr. Paredes didn't think surgery worth risks     Unspecified hypothyroidism      Unspecified hypothyroidism      Unspecified sleep apnea      Urinary incontinence 2014     Past Surgical History   Procedure Laterality Date     Surgical history of -        Ankle fracture Tib     Surgical history of -        TIA'a     Surgical history of -        Thyroiditis x3 remote     Surgical history of -        Ulnar nerve transpostion     Surgical history of -        Pacemaker (sinus phases, bradycardia)     Surgical history of -        L atrial pacer     Surgical history of -        appy     Surgical history of -        Benign breast biopsy     Endoscopic release carpal tunnel  10/26/2011     Procedure:ENDOSCOPIC RELEASE CARPAL TUNNEL; RIGHT ENDOSCOPIC CARPAL TUNNEL RELEASE, RIGHT LONG AND RING A-1 JAMIL RELEASE; Surgeon:KATE MARRERO; Location: [facility]     Release trigger finger  10/26/2011     Procedure:RELEASE TRIGGER FINGER; Surgeon:KATE MARRERO; Location: [facility]     Incision and drainage sacral wound, combined N/A 2014     Procedure: COMBINED INCISION AND DRAINAGE SACRAL WOUND;  Surgeon: Osorio Morgan MD;  Location: [facility] OR     Family History   Problem Relation Age of Onset     Family History Negative Mother      HEART DISEASE Father      HEART DISEASE Sister      CHF     HEART DISEASE Brother      multiple MI     Respiratory Brother      COPD     HEART DISEASE Brother      [place]     HEART DISEASE Brother       during open heart  "surgery     DIABETES No family hx of      Hypertension No family hx of      Social History     Social History     Marital status:      Spouse name: N/A     Number of children: N/A     Years of education: N/A     Occupational History     Not on file.     Social History Main Topics     Smoking status: Former Smoker     Years: 10.00     Quit date: 5/1/1966     Smokeless tobacco: Never Used     Alcohol use 28.0 oz/week      Comment: 4 oz of wine  each evening     Drug use: No     Sexual activity: Not Currently     Partners: Male     Other Topics Concern     Parent/Sibling W/ Cabg, Mi Or Angioplasty Before 65f 55m? No     Social History Narrative    Daughter in law does grocery shopping    Son helps her around the home if needed.    One daughter in [place], [place], [place] - one daughter has passed away.       Prior to admission medications were reviewed and include:   mg daily, furosemide 40 mg daily, KCl, metoprolol 25 mg BID, levothyroxine & lactulose.     Allergies   Allergen Reactions     Cortisone Other (See Comments)     Mental changes      Nuts Anaphylaxis         Examination:  BP (!) 124/99  Pulse 115  Temp 97.6  F (36.4  C) (Axillary)  Resp 9  Ht 1.651 m (5' 5\")  Wt 91.3 kg (201 lb 3.2 oz)  SpO2 95%  Breastfeeding? No  BMI 33.48 kg/m2  Elderly, obese  female lying in bed groaning.  She is alert and intermittently oriented enough to answer questions about her symptoms.  Breathing is not visible labored while she is lying flat.  Tachycardic, irregularly irregular rhythm.  Normal S1 and S2.  No murmur.  No rub.  Warm extremities with bilateral lower extremity lymphedema.  Lung sounds are entirely obscured by groaning.  Abdomen is tender and moderately distended but not rigid.  Not jaundiced.  Small areas of ecchymosis are present on both lower legs.      Labs, imaging & procedures were reviewed:  Creatinine 0.51.  K 5 -> 4.1.  K was 4.1.  ALT 7, AST \"unsatisfactory\", bilirubin 0.6, alkphos 88.  UA -> 5 " WBCs.  Recent Labs   Lab Test  03/03/17   1103  07/15/16   1147  07/14/16   0600  07/14/16   0017   WBC  11.9*  9.3  10.9  11.4*   HGB  14.7  11.1*  11.4*  11.9   PLT  263  207  284  311     CT chest:  1. No acute pulmonary embolism. Previously seen chronic pulmonary embolism in the right main pulmonary artery has improved  significantly.  2. Evidence of heart failure with elevated right heart dysfunction, dilated pulmonary artery and moderate pulmonary edema and new small bilateral pleural effusions right greater than left.    CT abdomen/pelvis:  1. Severe air distended loops of colon with large amount of stool in the rectum may represent early distal colonic obstruction, or less likely colonic ileus (Brenda's syndrome). No findings suggestive of ischemia. Non dilated small bowel loops.   a. Circumferential thickening of the rectum, could represent colitis. Recommend clinical correlation and possible additional evaluation of rectum.   2. Unchanged severe common bile duct dilatation with mildly improved intrahepatic biliary duct dilatation. Possible trace amount of  pneumobilia near the ampulla of Vater. Cannot exclude an obstructing biliary or pancreatic head mass. This could be further evaluated with MRCP or ERCP.   3. 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 Moderate stenosis of the left common iliac artery.  4. Large sliding-type hiatal hernia.  5. Please see CT pulmonary angiogram from the same day for details regarding the chest.       Assessment:  We saw this 89 yo female with several chronic medical conditions including atrial fibrillation, sinus node dysfunction and AV block s/p PPM 1999 with generator exchange in 2007, epidural hematoma in 7/2016 in the setting of supratherapeutic INR, history of pulmonary embolism, systemic and pulmonary hypertension, and obstructive sleep apnea for an elevated troponin and BNP upon her presentation to the  emergency department with colonic distention and abdominal pain.    1. Mildly elevated troponin without chest pain or convincing changes of ischemia on EKG, probably due to decompensated heart failure vs ongoing low grade ischemia vs stress cardiomyopathy although serial troponin measurements are necessary to exclude an evolving NSTEMI.  CT did not show propagation of her PE burden.  2. Acute systolic heart failure with EF of 15-20% per my quick assessment of bedside images during their acquisition, reduced from 55% on echocardiography in 2005.  She has not had prior evaluation for coronary artery disease in our system, but we recommend deferring evaluation with stress testing or coronary angiography until the resolution of her acute illness.  She was taking furosemide 40 mg daily prior to admission.  3. Atrial fibrillation, rate controlled with metoprolol, on ASA but not anticoagulated despite her CHADSVasc score of at least 5 (chf, htn, age, female) since an epidural hemorrhage 7 months ago.  4. History of chronic pulmonary embolism, seen again on CT today.  5. Pulmonary hypertension and RV dysfunction on prior echocardiography, not previously evaluated with right heart catheterization.  6. Colonic distention, being evaluated by internal medicine and surgery.    Recommendations:    Repeat troponin level and, if stable or barely more elevated, do not need to continue monitoring troponin in the absence of some clinical change suggestive of an acute coronary syndrome    Furosemide 40 mg IV once now, to be augmented with another 40 mg IV in a few hours if she does not respond by making 300-400 mL of urine in the next 2 hours.    We will follow up on her echocardiogram.    Regarding neurohormonal therapy for heart failure:    Continue metoprolol, which will need to eventually be switched to the succinate (XL) formulation.    Start lisinopril 2.5 mg BID, to be titrated up as tolerated.    Check TSH & HIV    I discussed  the patient with Dr. Damian Webber.    Charbel Osman MD  Cardiovascular disease fellow    STAFF CARDIOLOGIST: I supervised the cardiology fellow.  I interviewed the patient and family and examined the patient.  The active cardiac issues, include acute CHF decompensation (systolic dysfunction), pulmonary artery hypertension, elevate troponin (not necessarily ACS), and chronic AF, are outlined above.  I agree with the documentation above.  I would not recommend any aggressive treatment in this nonogenarian.   Treat conservatively with emphasis on CHF management, as above.    Damian Webber MD

## 2017-03-03 NOTE — IP AVS SNAPSHOT
Ankita Pham #[id number] (CSN: [id number])  (89 year old F)  (Adm: 17)     [id number]               [facility]: [phone]            Patient Demographics     Patient Name Sex          Age SSN Address Phone    Ankita Pham Female 8/3/1921 (89 year old) [id number]   [place] [address] [phone] (Home)  none (Work)      Emergency Contact(s)     Name Relation Home Work Mobile    Chandu Pham [phone]    Erik Pham Relative [phone]      Admission Information     Attending Provider Admitting Provider Admission Type Admission Date/Time    Carin Del Angel MD Kaltenborn, Guru Eric MD Emergency 17  1058    Discharge Date Hospital Service Auth/Cert Status Service Area     Hospitalist [facility]    Unit Room/Bed Admission Status        U7A 7215/7215-01 Admission (Confirmed)       Admission     Complaint    Colon distention      Hospital Account     Name Acct ID Class Status Primary Coverage    Ankita Pham [id number] Inpatient Open MEDICA - MEDICA DUAL SOLUTIONS [place] NON/FV PARTNERS            Guarantor Account (for Hospital Account #[id number])     Name Relation to Pt Service Area Active? Acct Type    Ankita Pham  FCS Yes Personal/Family    Address Phone          [address]  [place], [place] [address] [phone](H)  none(O)              Coverage Information (for Hospital Account #[id number])     F/O Payor/Plan Precert #    MEDICA/MEDICA DUAL SOLUTIONS [place] NON/FV PARTNERS     Subscriber Subscriber #    Ankita Pham [id number]    Address Phone    [address] [address]  [place] [address] [phone]                                                INTERAGENCY TRANSFER FORM - PHYSICIAN ORDERS   3/3/2017                       [facility]: [phone]            Attending Provider: Carin Del Angel MD     Allergies:  Cortisone, Nuts    Infection:  None  "  Service:  HOSPITALIST    Ht:  1.651 m (5' 5\")   Wt:  84.8 kg (187 lb)   Admission Wt:  91.3 kg (201 lb 3.2 oz)    BMI:  31.12 kg/m 2   BSA:  1.97 m 2            ED Clinical Impression     Diagnosis Description Comment Added By Time Added    Delirium [R41.0] Delirium [R41.0]  Mannie Lovell MD 3/3/2017  1:59 PM    Colon distention [K63.89] Colon distention [K63.89]  Mannie Lovell MD 3/3/2017  1:59 PM      Hospital Problems as of 3/6/2017              Priority Class Noted POA    Colon distention Medium  3/3/2017 Yes      Non-Hospital Problems as of 3/6/2017              Priority Class Noted    Myalgia and myositis   5/7/2003    Hypothyroidism   5/7/2003    Other lymphedema   5/7/2003    Osteoarthritis   6/24/2004    Chronic peptic ulcer   9/9/2004    Cardiac pacemaker in situ   9/9/2004    Irritable bowel syndrome   3/17/2005    Other chronic pulmonary heart diseases   3/17/2005    Abnormal glucose   6/20/2005    ESOPHAGEAL REFLUX   12/13/2006    Urinary incontinence   6/18/2007    Diaphragmatic hernia   Unknown    Chronic pain syndrome High  5/8/2009    OA (osteoarthritis)   8/26/2009    Sinoatrial node dysfunction (H)   3/31/2010    CARDIOVASCULAR SCREENING; LDL GOAL LESS THAN 130   5/9/2010    Hypertension goal BP (blood pressure) < 140/90   9/8/2010    Advance Care Planning   8/15/2011    Hypopotassemia   3/22/2012    Toe fracture, right   1/7/2013    Lymphedema   12/4/2013    Health Care Home   12/26/2013    Open wound of heel   3/31/2014    Impaired mobility Medium  5/12/2014    Soft tissue infection   5/24/2014    Microscopic hematuria   6/24/2014    Decubitus ulcer of coccyx Medium  8/1/2014    Urinary incontinence Medium  8/1/2014    Acute kidney injury (H)   8/26/2014    Rhabdomyolysis Medium  8/30/2014    Constipation   2/24/2015    Abdominal pain   2/27/2015    Epidural hematoma (H) Medium  7/14/2016      Code Status History     Date Active Date Inactive Code Status Order ID Comments User Context    " 3/6/2017 11:27 AM  DNR/DNI [id number]  Carin Del Angel MD Outpatient    3/3/2017  9:09 PM 3/6/2017 11:27 AM DNR/DNI [id number]  Carolyn Larry PA-C Inpatient    7/17/2016 10:42 AM 3/3/2017  9:09 PM DNR/DNI [id number]  Benito Muro MD Outpatient    7/14/2016  5:38 AM 7/17/2016 10:42 AM DNR/DNI [id number]  Mariam Maurice MD Inpatient    3/7/2015 10:38 AM 7/14/2016  5:38 AM DNR/DNI [id number]  William Landrum MD Outpatient    2/24/2015 10:37 PM 3/7/2015 10:38 AM DNR/DNI [id number]  Susan Acevedo PA Inpatient    9/2/2014  7:46 PM 2/24/2015 10:37 PM DNR/DNI [id number]  Martell Marie MD Outpatient    8/29/2014 12:16 PM 9/2/2014  7:46 PM DNR/DNI [id number]  Candie Fuentes MD Inpatient    8/28/2014  1:58 PM 8/28/2014  3:10 PM Full Code During Procedure [id number]  Osorio Morgan MD Inpatient    8/26/2014 11:57 PM 8/28/2014  1:58 PM DNR/DNI [id number]  Mariam Lundy MD Inpatient    5/24/2014  3:45 PM 5/30/2014  6:36 PM Full Code [id number]  Kai Enrique MD Inpatient    4/2/2004  5:27 PM 4/2/2004  5:27 PM  None  Cheryle Sharma Demographics      Current Code Status     Date Active Code Status Order ID Comments User Context       Prior      Summary of Visit     Reason for your hospital stay       Encephalopathy and subacute small bowel obstruction                Medication Review      START taking        Dose / Directions Comments    cefpodoxime 200 MG tablet   Commonly known as:  VANTIN   Indication:  Urinary Tract Infection   Used for:  Urinary tract infection without hematuria, site unspecified        Dose:  200 mg   Take 1 tablet (200 mg) by mouth 2 times daily for 3 days   Quantity:  6 tablet   Refills:  0        furosemide 40 MG tablet   Commonly known as:  LASIX   Used for:  Cardiac pacemaker in situ        Dose:  40 mg   Take 1 tablet (40 mg) by mouth daily   Quantity:  30 tablet   Refills:  0        lisinopril 5 MG tablet   Commonly known as:  PRINIVIL/ZESTRIL    Used for:  Hypertension goal BP (blood pressure) < 140/90        Dose:  5 mg   Take 1 tablet (5 mg) by mouth 2 times daily   Quantity:  30 tablet   Refills:  0          CONTINUE these medications which may have CHANGED, or have new prescriptions. If we are uncertain of the size of tablets/capsules you have at home, strength may be listed as something that might have changed.        Dose / Directions Comments    LORazepam 1 MG tablet   Commonly known as:  ATIVAN   This may have changed:    - medication strength  - how much to take   Used for:  Delirium        Dose:  0.5 mg   Take 0.5 tablets (0.5 mg) by mouth daily as needed for anxiety   Quantity:  30 tablet   Refills:  0        oxyCODONE 5 MG IR tablet   Commonly known as:  ROXICODONE   This may have changed:    - how much to take  - when to take this  - Another medication with the same name was removed. Continue taking this medication, and follow the directions you see here.   Used for:  Epidural hematoma (H)        Dose:  5 mg   Take 1 tablet (5 mg) by mouth every 8 hours as needed for moderate to severe pain   Quantity:  40 tablet   Refills:  0        polyethylene glycol Packet   Commonly known as:  MIRALAX/GLYCOLAX   This may have changed:  when to take this   Used for:  Slow transit constipation        Dose:  17 g   Take 17 g by mouth daily   Quantity:  7 packet   Refills:  0    Hold for loose stools       SIMETHICONE-80 PO   This may have changed:  Another medication with the same name was removed. Continue taking this medication, and follow the directions you see here.        Dose:  80 mg   Take 80 mg by mouth 2 times daily as needed for intestinal gas in addition to scheduled doses   Refills:  0          CONTINUE these medications which have NOT CHANGED        Dose / Directions Comments    acetaminophen 500 MG tablet   Commonly known as:  TYLENOL   Used for:  Chronic pain syndrome        Dose:  1000 mg   Take 2 tablets (1,000 mg) by mouth 3 times daily    Quantity:  60 tablet   Refills:  1        albuterol (2.5 MG/3ML) 0.083% neb solution   Used for:  Shortness of breath        Dose:  1 vial   Take 1 vial (2.5 mg) by nebulization every 6 hours as needed for shortness of breath / dyspnea or wheezing   Quantity:  360 mL   Refills:  0        ascorbic acid 500 MG Tabs        Dose:  500 mg   Take 1 tablet (500 mg) by mouth daily   Quantity:  30 tablet   Refills:  0        aspirin  MG EC tablet   Used for:  Other specified cardiac dysrhythmias(427.89)        Dose:  325 mg   Take 325 mg by mouth daily   Quantity:  40 tablet   Refills:  0        bisacodyl 10 MG Suppository   Commonly known as:  DULCOLAX   Used for:  Ileus (H)        Dose:  10 mg   Place 1 suppository (10 mg) rectally daily as needed for constipation   Quantity:  30 suppository   Refills:  0        CELEXA PO        Dose:  10 mg   Take 10 mg by mouth daily   Refills:  0        hypromellose 0.5 % Soln ophthalmic solution   Commonly known as:  ARTIFICIAL TEARS        Dose:  1 drop   Place 1 drop into both eyes 3 times daily   Refills:  0        lactulose 10 GM/15ML solution   Commonly known as:  CHRONULAC        Dose:  30 mL   Take 30 mLs by mouth daily as needed for constipation   Refills:  0        levothyroxine 75 MCG tablet   Commonly known as:  SYNTHROID/LEVOTHROID   Used for:  Unspecified hypothyroidism        Dose:  75 mcg   Take 1 tablet (75 mcg) by mouth daily   Quantity:  90 tablet   Refills:  3        magnesium oxide 400 MG tablet   Commonly known as:  MAG-OX   Used for:  Ileus (H)        Dose:  400 mg   Take 1 tablet (400 mg) by mouth daily   Quantity:  60 tablet   Refills:  0        METOPROLOL SUCCINATE ER PO        Dose:  12.5 mg   Take 12.5 mg by mouth At Bedtime   Refills:  0        multivitamin  with lutein Caps per capsule   Used for:  Physical deconditioning        Dose:  1 capsule   Take 1 capsule by mouth daily   Refills:  0        NEURONTIN PO        Dose:  200 mg   Take 200 mg by  mouth daily   Refills:  0        omeprazole 20 MG tablet   Used for:  Abdominal pain, other specified site        Dose:  20 mg   Take 1 tablet (20 mg) by mouth 2 times daily   Quantity:  30 tablet   Refills:  0        potassium chloride SA 20 MEQ CR tablet   Commonly known as:  K-DUR/KLOR-CON M   Used for:  Ileus (H)        Dose:  20 mEq   Take 1 tablet (20 mEq) by mouth daily   Quantity:  60 tablet   Refills:  0        senna-docusate 8.6-50 MG per tablet   Commonly known as:  SENOKOT-S;PERICOLACE   Used for:  Slow transit constipation        Dose:  2 tablet   Take 2 tablets by mouth 2 times daily   Quantity:  100 tablet   Refills:  0    Hold for loose stools       ZOFRAN PO        Dose:  4 mg   Take 4 mg by mouth every 6 hours as needed for nausea or vomiting   Refills:  0                After Care     Activity - Up with nursing assistance       Patient is largely bed bound. Encourage with PT to get out if possible       Advance Diet as Tolerated       Follow this diet upon discharge: Orders Placed This Encounter      Fluid restriction 1500 ML FLUID      Regular Diet Adult       Daily weights       Call Provider for weight gain of more than 2 pounds per day or 5 pounds per week.       Pennington catheter       To straight gravity drainage. Change catheter every 2 weeks and PRN for leaking or decreased uring output with signs of bladder distention. DO NOT change catheter without a specific MD order IF diagnosis of benign prostatic hypertrophy (BPH), neurogenic bladder, or other urological conditions       General info for SNF       Length of Stay Estimate: Long Term Care  Condition at Discharge: Improving  Level of care:skilled   Rehabilitation Potential: Poor  Admission H&P remains valid and up-to-date: Yes  Recent Chemotherapy: N/A  Use Nursing Home Standing Orders: Yes       Mantoux instructions       Give two-step Mantoux (PPD) Per Facility Policy Yes       Weight bearing status       Patient has stopped getting out  "of bed or sitting in chair.   All therapy to be confined to bed bound exercises, unless we can re -evaluate her to see if we can start getting out of bed with assistance.   No contraindication for weight bearing               Further instructions from your care team       You will be contacted by the [facility] CORE clinic within 2 days of discharge to schedule your follow up appointment. This appointment should be within 1 week of discharge. If you do not hear back from them within 48 hours of discharge, please call [phone].    General Recommendations To Control Heart Failure When You Get Home (Give at DC from TCU)     Instructions To Patients and Families: Please read and check off each of these important instructions as you do them when you get home.           Weight and symptoms      ___ Put a scale in your bathroom  ___ Post a weight chart or calendar next to the scale  ___Weigh yourself every day as soon as you you get up in the morning. You should only be wearing your pajamas. Write your weight on the chart/calendar.  ___ Bring your weight chart/calendar with you to all appointments    ___Call your doctor if you gain 2 pounds in 1 day or 5 pounds in 1 week from your \"dry\" weight (baseline weight). Also call your doctor if you have shortness of breath that gets worse over time, leg swelling or fatigue.         Medicines and diet     ___ Make sure to take your medicines as prescribed.    ___Bring a current list of your medicines and all of your medicine bottles with you to all appointments.    ___ Limit fluids if you still have swelling or shortness of breath, or if your doctor tells you to do so.  ___ Eat less than 2000 mg of sodium (salt) every day. Read food labels, and do not add salt to meals.   ___ Heart healthy diet with low fat and low cholesterol          Activity and suggested lifestyle changes    ___ Stay active. Talk to your doctor about an exercise program that is safe for your heart.    ___ Stop " smoking. Reduce alcohol use.      ___ Lose weight if you are overweight. Extra weight puts a lot of stress on the heart.          Control for Leg Swelling   ___ Keep your legs elevated (raised) as needed for swelling. If swelling is uncomfortable or elevation doesn t help, ask your doctor about using ACE wrap or Jobst stockings.          Follow-up appointments   ___ Make a C.O.R.E. Clinic appointment with a basic metabolic panel lab draw 3 to 5 days after you leave the hospital. Call one of the following locations:   [facility]  [phone],  [facility] [phone],  [facility]  [phone].     ___ Make sure to take your medications as prescribed and bring an accurate list of your medications and your weight chart/calendar to your follow up appointment at the C.O.R.E. Clinic for continued education and adjustments          What is the CORE clinic?    The C.O.R.E (Cardiomyopathy, Optimization, Rehabilitation, Education) Clinic is a heart failure specialty clinic within the [facility]. At C.O.R.E., you will work with nurse practitioners to carefully adjust medicines, get education and learn who and when to call if symptoms appear. C.O.R.E nurses specialize in helping you:    better understand your disease.    slow the progress of your disease.    improve the length and quality of your life.    detect future heart problems before they become life threatening.    avoid hospital stays.            Procedures     Oxygen - Nasal cannula       2  Lpm by nasal cannula to keep O2 sats 88% or greater.   Please use incentive spirometer if patient is not comfortable with Oxygen             Supplies     Pneumatic Compression Device        Bilateral calf. Remove 30 mins BID.             Referrals     CARDIOVASCULAR CORE CLINIC REFERRAL       Chronic systolic heart failure       Occupational Therapy  "Adult Consult       Evaluate and treat as clinically indicated.    Reason:  Deconditioning       Occupational Therapy Adult Consult       Evaluate and treat as clinically indicated.    Reason: Lymphedema therapy       Physical Therapy Adult Consult       Evaluate and treat as clinically indicated.    Reason: Deconditioning             Follow-Up Appointment Instructions     Follow Up and recommended labs and tests       Follow up with NH physician in 1 week of discharge  Please follow up with Cardiology CORE clinic within 4 weeks of discharge             Your next 10 appointments already scheduled     Apr 06, 2017  2:30 PM CDT   (Arrive by 2:15 PM)   Return Visit with Sean Fernandez MD   [facility] ([facility])    [address]  3rd Floor  [facility] [address] [phone]            May 16, 2017 10:00 AM CDT   Phone Device Check with ACKERMAN TECH1   [facility] ([facility])    [address] [place] [address] [phone]              Statement of Approval     Ordered          03/06/17 1127  I have reviewed and agree with all the recommendations and orders detailed in this document.  EFFECTIVE NOW     Approved and electronically signed by:  Carin Del Angel MD                                                 INTERAGENCY TRANSFER FORM - NURSING   3/3/2017                       [facility] [facility]: [phone]            Attending Provider: Carin Del Angel MD     Allergies:  Cortisone, Nuts    Infection:  None   Service:  HOSPITALIST    Ht:  1.651 m (5' 5\")   Wt:  84.8 kg (187 lb)   Admission Wt:  91.3 kg (201 lb 3.2 oz)    BMI:  31.12 kg/m 2   BSA:  1.97 m 2            Advance Directives        Does patient have a scanned Advance Directive/ACP document in EPIC?           No        Immunizations     Name Date      Influenza (High Dose) 3 valent vaccine 09/19/12     " "Influenza (High Dose) 3 valent vaccine 10/19/11     Influenza (High Dose) 3 valent vaccine 10/22/10     Influenza (IIV3) 10/01/13     Influenza (IIV3) 10/28/09     Influenza (IIV3) 11/19/08     Influenza (IIV3) 12/31/07     Influenza (IIV3) 10/19/06     Influenza (IIV3) 11/22/05     Pneumococcal 23 valent 10/01/11     TD (ADULT, 7+) 05/08/09       ASSESSMENT     Discharge Profile Flowsheet     DISCHARGE NEEDS ASSESSMENT     Existing Resources/Services  Other (see comment) 02/25/15 0917    Transportation Available  van, wheelchair accessible 02/25/15 0917   SKIN      Equipment Used at Home  walker, rolling;wheelchair;grab bar 08/29/14 0932   Inspection  Full 03/06/17 0905    GASTROINTESTINAL (ADULT,PEDIATRIC,OB)     Skin WDL  ex 03/06/17 0905    GI WDL  ex 03/06/17 0905   Skin Color/Characteristics  bruised (ecchymotic) 03/06/17 0905    Abdominal Appearance  obese 03/06/17 0905   Skin Temperature  warm 03/06/17 0905    All Quadrants Bowel Sounds  hyperactive 03/06/17 0905   Skin Moisture  dry 03/06/17 0905    Last Bowel Movement  03/06/17 03/06/17 0905   Skin Elasticity  slow return to original state 03/06/17 0905    GI Signs/Symptoms  nausea 03/06/17 0420   Skin Integrity  other (see comments) 03/06/17 0420    COMMUNICATION ASSESSMENT     SAFETY      Patient's communication style  spoken language (English or Bilingual) 03/03/17 1057   Safety WDL  WDL 03/06/17 0905    FINAL RESOURCES     Safety Factors  bed in low position 03/06/17 0905    Resources List  Skilled Nursing Facility 02/25/15 0917                      Assessment WDL (Within Defined Limits) Definitions           Safety WDL     Effective: 09/28/15    Row Information: <b>WDL Definition:</b> Bed in low position, wheels locked; call light in reach; upper side rails up x 2; ID band on<br> <font color=\"gray\"><i>Item=AS safety wdl>>List=AS safety wdl>>Version=F14</i></font>      Skin WDL     Effective: 09/28/15    Row Information: <b>WDL Definition:</b> Warm; " "dry; intact; elastic; without discoloration; pressure points without redness<br> <font color=\"gray\"><i>Item=AS skin wdl>>List=AS skin wdl>>Version=F14</i></font>      Vitals     Vital Signs Flowsheet     VITAL SIGNS     Sleep  normal sleep 03/05/17 2304    Temp  97.8  F (36.6  C) 03/06/17 0728   HEIGHT AND WEIGHT      Temp src  Oral 03/06/17 0728   Height  1.651 m (5' 5\") 03/04/17 0627    Resp  16 03/06/17 0728   Height Method  Stated 03/03/17 1102    Pulse  103 03/06/17 0728   Weight  84.8 kg (187 lb) 03/04/17 2347    Heart Rate  80 03/05/17 2304   BSA (Calculated - sq m)  2.05 03/03/17 1102    Pulse/Heart Rate Source  Monitor 03/05/17 2304   BMI (Calculated)  33.55 03/03/17 1102    BP  112/89 03/06/17 0859   CHANDRA COMA SCALE      BP Location  Left arm 03/06/17 0728   Best Eye Response  4-->(E4) spontaneous 03/05/17 0355    OXYGEN THERAPY     Best Motor Response  6-->(M6) obeys commands 03/05/17 0355    SpO2  (!)  87 % 03/06/17 1147   Best Verbal Response  5-->(V5) oriented 03/05/17 0355    O2 Device  None (Room air) (refused o2) 03/06/17 1148   Chandra Coma Scale Score  15 03/05/17 0355    Oxygen Delivery  3 LPM 03/06/17 0728   POSITIONING      PAIN/COMFORT     Body Position  log-rolled 03/06/17 1055    Patient Currently in Pain  denies 03/06/17 0815   Head of Bed (HOB)  HOB at 20-30 degrees 03/06/17 1055    Preferred Pain Scale  CAPA (Clinically Aligned Pain Assessment) ([facility], [facility] Adults Only) 03/05/17 2304   DAILY CARE      Pain Location  Back 03/04/17 0839   Activity Type  activity adjusted per tolerance 03/06/17 1055    Pain Intervention(s)  Repositioned 03/04/17 0839   Activity Level of Assistance  assistance, 2 people 03/06/17 1055    CLINICALLY ALIGNED PAIN ASSESSMENT (CAPA) ([facility], [facility] ADULTS ONLY)     ECG      Comfort  tolerable with discomfort 03/05/17 2304   ECG Rhythm  Atrial flutter 03/05/17 1918    Change in Pain  about the same 03/05/17 2304   Ectopy  PVC 03/05/17 " 1918    Pain Control  fully effective 03/05/17 2304   Ectopy Frequency  Occasional 03/05/17 1918    Functioning  can do most things, but pain gets in the way of some 03/05/17 2304   Equipment  telemetry batteries changed;electrodes changed 03/05/17 1643            Patient Lines/Drains/Airways Status    Active LINES/DRAINS/AIRWAYS     Name: Placement date: Placement time: Site: Days: Last dressing change:    Urethral Catheter Coude 16 fr 03/03/17   1331   Coude   3     Peripheral IV 03/04/17 Right Lower forearm 03/04/17   0520   Lower forearm   2     Pressure Ulcer 05/24/14 Coccyx  05/24/14   1730    1016     Pressure Ulcer 02/25/15 Left Heel Small open ulcer 02/25/15   0206    740     Pressure Ulcer 07/14/16 Coccyx tunneled area- size of a pea, present on admission  Stage 3-4 07/14/16   0520    235     Wound 05/24/14 Right Foot Ulceration 05/24/14   1345   Foot   1017     Wound 08/26/14 Sacrum L=3cm, W=3.5 cm.depth= 4 cm at 6'oclock 08/26/14   2304   Sacrum   922     Incision/Surgical Site 08/28/14 Bilateral Sacrum 08/28/14   1354    921             Patient Lines/Drains/Airways Status    Active PICC/CVC     None            Intake/Output Detail Report     Date Intake     Output   Net    Shift P.O. I.V. IV Piggyback Total Urine Stool Total       Day 03/05/17 0000 - 03/05/17 0659 120 -- -- 120 70 -- 70 50    Olena 03/05/17 0700 - 03/05/17 1459 300 100 -- 400 90 -- 90 310    Noc 03/05/17 1500 - 03/05/17 2359 50 -- -- 50 475 -- 475 -425    Day 03/06/17 0000 - 03/06/17 0659 -- -- -- -- 1200 -- 1200 -1200    Olena 03/06/17 0700 - 03/06/17 1459 315 -- -- 315 -- -- -- 315      Last Void/BM       Most Recent Value    Urine Occurrence     Stool Occurrence 1 at 03/06/2017 0816      Case Management/Discharge Planning     Case Management/Discharge Planning Flowsheet     LIVING ENVIRONMENT     Existing Resources/Services  Other (see comment) 02/25/15 0917    Lives With  facility resident 03/03/17 4153   MH/BH CAREGIVER      Living  Arrangements  extended care facility 03/04/15 1054   Filed Complexity Screen Score  9 03/06/17 1121    Provides Primary Care For  no one 05/24/14 1903   ABUSE RISK SCREEN      COPING/STRESS     QUESTION TO PATIENT:  Has a member of your family or a partner(now or in the past) intimidated, hurt, manipulated, or controlled you in any way?  patient declined to answer or is unable to answer 03/03/17 1108    Major Change/Loss/Stressor  hospitalization 03/04/17 1340   QUESTION TO PATIENT: Do you feel safe going back to the place where you are living?  patient declined to answer or is unable to answer 03/03/17 1108    DISCHARGE PLANNING     OBSERVATION: Is there reason to believe there has been maltreatment of a vulnerable adult (ie. Physical/Sexual/Emotional abuse, self neglect, lack of adequate food, shelter, medical care, or financial exploitation)?  no 03/03/17 1108    Transportation Available  van, wheelchair accessible 02/25/15 0917   (R) MENTAL HEALTH SUICIDE RISK      Equipment Used at Home  walker, rolling;wheelchair;grab bar 08/29/14 0932   Are you depressed or being treated for depression?  No 03/04/17 0632    FINAL RESOURCES     HOMICIDE RISK      Resources List  Skilled Nursing Facility 02/25/15 0917   Homicidal Ideation  other (see comments) 03/03/17 1108                  [address] [facility] BANK: [phone]            Medication Administration Report for Ankita Pham as of 03/06/17 1359   Legend:    Given Hold Not Given Due Canceled Entry Other Actions    Time Time (Time) Time  Time-Action       Inactive    Active    Linked        Medications 02/28/17 03/01/17 03/02/17 03/03/17 03/04/17 03/05/17 03/06/17    acetaminophen (TYLENOL) Suppository 325-650 mg  Dose: 325-650 mg Freq: EVERY 6 HOURS PRN Route: RE  PRN Reason: fever  Start: 03/03/17 2130   Admin Instructions: Maximum acetaminophen dose from all sources= 75 mg/kg/day or 4 g/day.               acetaminophen (TYLENOL) tablet 1,000 mg  Dose: 1,000 mg  Freq: 3 TIMES DAILY Route: PO  Start: 03/03/17 2115   Admin Instructions: Maximum acetaminophen dose from all sources = 75 mg/kg/day not to exceed 4 gram        (2122)-Not Given [C]        (1026)-Not Given       (1548)-Not Given       (1928)-Not Given        0822 (1,000 mg)-Given       1403 (1,000 mg)-Given       1933 (1,000 mg)-Given        0807 (1,000 mg)-Given       [ ] 1400       [ ] 2000           albuterol neb solution 2.5 mg  Dose: 1 vial Freq: EVERY 6 HOURS PRN Route: NEBULIZATION  PRN Reasons: shortness of breath / dyspnea,wheezing  Start: 03/03/17 2109              ascorbic acid tablet 500 mg  Dose: 500 mg Freq: DAILY Route: PO  Start: 03/04/17 0800        (1145)-Not Given        0823 (500 mg)-Given        0950 (500 mg)-Given           aspirin EC EC tablet 325 mg  Dose: 325 mg Freq: DAILY Route: PO  Start: 03/04/17 0800   Admin Instructions: DO NOT CRUSH.         (1146)-Not Given        0823 (325 mg)-Given        0807 (325 mg)-Given           cefpodoxime (VANTIN) tablet 200 mg  Dose: 200 mg Freq: 2 TIMES DAILY Route: PO  Indications of Use: URINARY TRACT INFECTION  Start: 03/05/17 1145   End: 03/09/17 0759         1632 (200 mg)-Given       1933 (200 mg)-Given        0807 (200 mg)-Given       [ ] 2000           citalopram (celeXA) tablet 10 mg  Dose: 10 mg Freq: DAILY Route: PO  Start: 03/04/17 0800        (1146)-Not Given        0823 (10 mg)-Given        0806 (10 mg)-Given           furosemide (LASIX) tablet 40 mg  Dose: 40 mg Freq: DAILY Route: PO  Start: 03/05/17 1645         1803 (40 mg)-Given        0806 (40 mg)-Given           hypromellose-dextran (ARTIFICAL TEARS) ophthalmic solution 1 drop  Dose: 1 drop Freq: 3 TIMES DAILY Route: Both Eyes  Start: 03/03/17 2145       (2346)-Not Given        (1332)-Not Given       (1732)-Not Given       (2013)-Not Given        (0800)-Not Given [C]       1404 (1 drop)-Given       1933 (1 drop)-Given        0807 (1 drop)-Given       [ ] 1400       [ ] 2000            "levothyroxine (SYNTHROID/LEVOTHROID) tablet 75 mcg  Dose: 75 mcg Freq: DAILY Route: PO  Start: 03/04/17 0800        (1146)-Not Given        0823 (75 mcg)-Given        0807 (75 mcg)-Given           lidocaine (LMX4) kit  Freq: EVERY 1 HOUR PRN Route: Top  PRN Reason: pain  PRN Comment: with VAD insertion or accessing implanted port.  Start: 03/03/17 2109   Admin Instructions: Do NOT give if patient has a history of allergy to any local anesthetic or any \"erma\" product.   Apply 30 minutes prior to VAD insertion or port access.  MAX Dose:  2.5 g (  of 5 g tube)               lidocaine 1 % 1 mL  Dose: 1 mL Freq: EVERY 1 HOUR PRN Route: OTHER  PRN Comment: mild pain with VAD insertion or accessing implanted port  Start: 03/03/17 2109   Admin Instructions: Do NOT give if patient has a history of allergy to any local anesthetic or any \"erma\" product. MAX dose 1 mL subcutaneous OR intradermal in divided doses.               lisinopril (PRINIVIL/ZESTRIL) tablet 5 mg  Dose: 5 mg Freq: 2 TIMES DAILY Route: PO  Start: 03/05/17 2000   Admin Instructions: Hold for SBP <110          1933 (5 mg)-Given        0806 (5 mg)-Given       [ ] 2000           magnesium oxide (MAG-OX) tablet 400 mg  Dose: 400 mg Freq: DAILY Route: PO  Start: 03/04/17 0800        (1146)-Not Given        0822 (400 mg)-Given        0807 (400 mg)-Given           magnesium sulfate 2 g in NS intermittent infusion ([employer]ar[employer] or [facility] Cmpd)  Dose: 2 g Freq: DAILY PRN Route: IV  PRN Reason: magnesium supplementation  Start: 03/03/17 2109   Admin Instructions: For Serum Mg++ 1.6 - 2 mg/dL  Give 2 g and recheck magnesium level next AM.         1331 (2 g)-New Bag             magnesium sulfate 4 g in 100 mL sterile water (premade)  Dose: 4 g Freq: EVERY 4 HOURS PRN Route: IV  PRN Reason: magnesium supplementation  Start: 03/03/17 2109   Admin Instructions: For serum Mg++ less than 1.6 mg/dL  Give 4 g and recheck magnesium level 2 hours after dose, and next AM.            "    metoprolol (LOPRESSOR) injection 2.5 mg  Dose: 2.5 mg Freq: 2 TIMES DAILY PRN Route: IV  PRN Reason: high blood pressure  PRN Comment: for HR > 130  Start: 03/04/17 1021              metoprolol (TOPROL-XL) half-tab 12.5 mg  Dose: 12.5 mg Freq: DAILY Route: PO  Start: 03/05/17 0915   Admin Instructions: Hold for BP < 120 systolic or HR < 55          (1214)-Not Given        0806 (12.5 mg)-Given           multivitamin  with lutein (OCUVITE WITH LTEIN) per capsule 1 capsule  Dose: 1 capsule Freq: DAILY Route: PO  Start: 03/04/17 0800        (1147)-Not Given        0823 (1 capsule)-Given        0806 (1 capsule)-Given           naloxone (NARCAN) injection 0.1-0.4 mg  Dose: 0.1-0.4 mg Freq: EVERY 2 MIN PRN Route: IV  PRN Reason: opioid reversal  Start: 03/03/17 2109   Admin Instructions: For respiratory rate LESS than or EQUAL to 8.  Partial reversal dose:  0.1 mg titrated q 2 minutes for Analgesia Side Effects Monitoring Sedation Level of 3 (frequently drowsy, arousable, drifts to sleep during conversation).Full reversal dose:  0.4 mg bolus for Analgesia Side Effects Monitoring Sedation Level of 4 (somnolent, minimal or no response to stimulation).               omeprazole (priLOSEC) CR capsule 20 mg  Dose: 20 mg Freq: 2 TIMES DAILY Route: PO  Start: 03/04/17 0800        (1148)-Not Given       (1928)-Not Given        0823 (20 mg)-Given       1933 (20 mg)-Given        0806 (20 mg)-Given       [ ] 2000           ondansetron (ZOFRAN-ODT) ODT tab 4 mg  Dose: 4 mg Freq: EVERY 6 HOURS PRN Route: PO  PRN Reason: nausea  Start: 03/03/17 2109   Admin Instructions: This is Step 1 of nausea and vomiting management.  If nausea not resolved in 15 minutes, go to Step 2 prochlorperazine (COMPAZINE). Do not push through foil backing. Peel back foil and gently remove. Place on tongue immediately. Administration with liquid unnecessary                            Or  ondansetron (ZOFRAN) injection 4 mg  Dose: 4 mg Freq: EVERY 6 HOURS PRN  Route: IV  PRN Reasons: nausea,vomiting  Start: 03/03/17 2109   Admin Instructions: This is Step 1 of nausea and vomiting management.  If nausea not resolved in 15 minutes, go to Step 2 prochlorperazine (COMPAZINE).  Irritant.         0521 (4 mg)-Given       0831 (4 mg)-Given             polyethylene glycol (MIRALAX/GLYCOLAX) Packet 17 g  Dose: 17 g Freq: DAILY Route: PO  Start: 03/05/17 1145   Admin Instructions: 1 Packet = 17 grams. Mixed prescribed dose in 8 ounces of water. Follow with 8 oz. of water.          1403 (17 g)-Given               (0815)-Not Given [C]           potassium chloride (KLOR-CON) Packet 20-40 mEq  Dose: 20-40 mEq Freq: EVERY 2 HOURS PRN Route: ORAL OR FEED  PRN Reason: potassium supplementation  Start: 03/03/17 2110   Admin Instructions: Use if unable to tolerate tablets.    If Serum K+ 3.4-4.0, dose = 20 mEq x1. Recheck K+ level the next AM.  If Serum K+ 3.0-3.3, dose = 60 mEq po total dose (40 mEq x 1 followed in 2 hours by 20 mEq X1). Recheck K+ level 4 hours after dose and the next AM.  If Serum K+ 2.5-2.9, dose = 80 mEq po total dose (40 mEq Q2H x2). Recheck K+ level 4 hours after dose and the next AM.  If Serum K+ less than 2.5, See IV order.  Dissolve packet contents in 4-8 ounces of cold water or juice.                      potassium chloride 10 mEq in 100 mL intermittent infusion  Dose: 10 mEq Freq: EVERY 1 HOUR PRN Route: IV  PRN Reason: potassium supplementation  Last Dose: Stopped (03/05/17 0920)  Start: 03/03/17 2110   Admin Instructions: Infuse via PERIPHERAL LINE or CENTRAL LINE. Use for central line replacement if patient weight less than 65 kg, if patient is on TPN with high potassium content or if unit does not stock 20 mEq bags.  If Serum K+ 3.4-4.0, dose = 10 mEq/hr x2 doses. Recheck K+ level the next AM.  If Serum K+ 3.0-3.3, dose = 10 mEq/hr x4 doses (40 mEq IV total dose). Recheck K+ level 2 hours after dose and the next AM.  If Serum K+ less than 3.0, dose = 10  mEq/hr x6 doses (60 mEq IV total dose). Recheck K+ level 2 hours after dose and the next AM.          0914 (10 mEq)-New Bag       0920-Stopped            potassium chloride 10 mEq in 100 mL intermittent infusion with 10 mg lidocaine  Dose: 10 mEq Freq: EVERY 1 HOUR PRN Route: IV  PRN Reason: potassium supplementation  Start: 03/03/17 2110   Admin Instructions: Infuse via PERIPHERAL LINE. Use potassium with lidocaine for pain with peripheral administration.  If Serum K+ 3.4-4.0, dose = 10 mEq/hr x2 doses. Recheck K+ level the next AM.  If Serum K+ 3.0-3.3, dose = 10 mEq/hr x4 doses (40 mEq IV total dose). Recheck K+ level 2 hours after dose and the next AM.  If Serum K+ less than 3.0, dose = 10 mEq/hr x6 doses (60 mEq IV total dose). Recheck K+ level 2 hours after dose and the next AM.         1331 (10 mEq)-New Bag       2052 (10 mEq)-New Bag       2209 (10 mEq)-New Bag        1052 (10 mEq)-New Bag [C]            potassium chloride 20 mEq in 50 mL intermittent infusion  Dose: 20 mEq Freq: EVERY 1 HOUR PRN Route: IV  PRN Reason: potassium supplementation  Start: 03/03/17 2110   Admin Instructions: Infuse via CENTRAL LINE Only.  May need EKG if less than 65 kg or on TPN - Max rate is 0.3 mEq/kg/hr for patients not on EKG monitoring.    If Serum K+ 3.4-4.0, dose = 20 mEq/hr x1 doses. Recheck K+ level the next AM.  If Serum K+ 3.0-3.3, dose = 20 mEq/hr x2 doses (40 mEq IV total dose).  Recheck K+ level 2 hours after dose and the next AM.  If Serum K+ less than 3.0, dose = 20 mEq/hr x3 doses (60 mEq IV total dose). Recheck K+ level 2 hours after dose and the next AM.               potassium chloride SA (K-DUR/KLOR-CON M) CR tablet 20 mEq  Dose: 20 mEq Freq: DAILY Route: PO  Start: 03/04/17 0800   Admin Instructions: DO NOT CRUSH.         (1148)-Not Given        0823 (20 mEq)-Given        0806 (20 mEq)-Given           potassium chloride SA (K-DUR/KLOR-CON M) CR tablet 20-40 mEq  Dose: 20-40 mEq Freq: EVERY 2 HOURS PRN  Route: PO  PRN Reason: potassium supplementation  Start: 03/03/17 2110   Admin Instructions: Use if able to take PO.   If Serum K+ 3.4-4.0, dose = 20 mEq x1. Recheck K+ level the next AM.  If Serum K+ 3.0-3.3, dose = 60 mEq po total dose (40 mEq x1 followed in 2 hours by 20 mEq x1). Recheck K+ level 4 hours after dose and the next AM.  If Serum K+ 2.5-2.9, dose = 80 mEq po total dose (40 mEq Q2H x2). Recheck K+ level 4 hours after dose and the next AM.  If Serum K+ less than 2.5, See IV order.  DO NOT CRUSH.           (9733)-Not Given [C]           prochlorperazine (COMPAZINE) injection 5 mg  Dose: 5 mg Freq: EVERY 6 HOURS PRN Route: IV  PRN Reasons: nausea,vomiting  Start: 03/03/17 2109   Admin Instructions: This is Step 2 of nausea and vomiting management.   If nausea not resolved in 15 minutes, give metoclopramide (REGLAN) if ordered (step 3 of nausea and vomiting management)              Or  prochlorperazine (COMPAZINE) tablet 5 mg  Dose: 5 mg Freq: EVERY 6 HOURS PRN Route: PO  PRN Reason: vomiting  Start: 03/03/17 2109   Admin Instructions: This is Step 2 of nausea and vomiting management.   If nausea not resolved in 15 minutes, give metoclopramide (REGLAN) if ordered (step 3 of nausea and vomiting management)              Or  prochlorperazine (COMPAZINE) Suppository 12.5 mg  Dose: 12.5 mg Freq: EVERY 12 HOURS PRN Route: RE  PRN Reasons: nausea,vomiting  Start: 03/03/17 2109   Admin Instructions: This is Step 2 of nausea and vomiting management.   If nausea not resolved in 15 minutes, give metoclopramide (REGLAN) if ordered (step 3 of nausea and vomiting management)               sodium chloride (PF) 0.9% PF flush 3 mL  Dose: 3 mL Freq: EVERY 8 HOURS Route: IK  Start: 03/03/17 2115   Admin Instructions: And Q1H PRN, to lock peripheral IV dormant line.        2216 (3 mL)-Given        0419 (3 mL)-Given       (1345)-Not Given       2052 (3 mL)-Given        0535 (3 mL)-Given       1638 (3 mL)-Given                0815 (3 mL)-Given       [ ] 1600           sodium chloride (PF) 0.9% PF flush 3 mL  Dose: 3 mL Freq: EVERY 1 HOUR PRN Route: IK  PRN Reason: line flush  PRN Comment: for peripheral IV flush post IV meds  Start: 03/03/17 2109             Completed Medications  Medications 02/28/17 03/01/17 03/02/17 03/03/17 03/04/17 03/05/17 03/06/17         Dose: 1 g Freq: ONCE Route: IV  Indications of Use: URINARY TRACT INFECTION  Last Dose: Stopped (03/03/17 1605)  Start: 03/03/17 1422   End: 03/03/17 1605   Admin Instructions: 200 mL/hr for 30 minutes.        1524 (1 g)-New Bag       1605-Stopped                Dose: 40 mg Freq: ONCE Route: IV  Start: 03/04/17 1000   End: 03/04/17 1059        1059 (40 mg)-Given               Dose: 40 mg Freq: ONCE Route: IV  Start: 03/03/17 1730   End: 03/03/17 1802       1802 (40 mg)-Given                Dose: 5 mL Freq: ONCE Route: IV  Start: 03/03/17 1556   End: 03/03/17 1556       1556 (5 mL)-Given             Discontinued Medications  Medications 02/28/17 03/01/17 03/02/17 03/03/17 03/04/17 03/05/17 03/06/17         Rate: 125 mL/hr Freq: CONTINUOUS Route: IV  Start: 03/03/17 1109   End: 03/03/17 1730   Admin Instructions: Administer after the bolus/s        (1332)-Not Given       1730-Med Discontinued            Dose: 40 mg Freq: ONCE Route: IV  Start: 03/03/17 1710   End: 03/03/17 1725              1725-Med Discontinued            Dose: 1 drop Freq: 3 TIMES DAILY Route: Both Eyes  Start: 03/03/17 2115   End: 03/03/17 2131              2131-Med Discontinued            Dose: 2.5 mg Freq: 2 TIMES DAILY Route: PO  Start: 03/03/17 2000   End: 03/05/17 0830   Admin Instructions: Hold for SBP <110        (2122)-Not Given [C]        1331 (2.5 mg)-Given       (2012)-Not Given        0823 (2.5 mg)-Given       0830-Med Discontinued          Dose: 10 mg Freq: EVERY 8 HOURS Route: IV  Start: 03/04/17 1000   End: 03/04/17 1022   Admin Instructions: Hold for systolic < 105 or HR< 60         1022-Med  Discontinued  (1026)-Not Given               Dose: 2.5 mg Freq: EVERY 8 HOURS Route: IV  Start: 03/04/17 1800   End: 03/05/17 0906   Admin Instructions: Hold for systolic < 105 or HR< 60         1733 (2.5 mg)-Given        0208 (2.5 mg)-Given       0906-Med Discontinued          Dose: 5 mg Freq: EVERY 6 HOURS PRN Route: IV  PRN Reason: other  PRN Comment: HR >120  Start: 03/03/17 2129   End: 03/04/17 1027   Admin Instructions: Hold for SBP <100         1027-Med Discontinued           Dose: 12.5 mg Freq: HOLD Route: PO  PRN Comment: SBO  Start: 03/04/17 1015   End: 03/05/17 0906        1026-Rx hold         0906-Unhold       0906-Med Discontinued          Dose: 12.5 mg Freq: AT BEDTIME Route: PO  Start: 03/03/17 2200   End: 03/04/17 1000       (2122)-Not Given [C]        1000-Med Discontinued           Dose: 20 mEq Freq: DAILY Route: PO  Start: 03/05/17 1645   End: 03/05/17 1655   Admin Instructions: DO NOT CRUSH.                 1655-Med Discontinued     Medications 02/28/17 03/01/17 03/02/17 03/03/17 03/04/17 03/05/17 03/06/17               INTERAGENCY TRANSFER FORM - NOTES (H&P, Discharge Summary, Consults, Procedures, Therapies)   3/3/2017                       [address] [facility] BANK: [phone]               History & Physicals      H&P by Guru Lewis MD at 3/3/2017  4:47 PM     Author:  Guru Lewis MD Service:  Hospitalist Author Type:  Physician    Filed:  3/3/2017 11:02 PM Date of Service:  3/3/2017  4:47 PM Note Created:  3/3/2017  4:46 PM    Status:  Addendum :  Guru Lewis MD (Physician)           [facility] Medicine History and Physical  Department of Internal Medicine    Patient Name: Ankita Pham MRN# [id number]   Age: 89 year old YOB: 1921     Date of Admission: 3/3/2017    Home clinic:[AB1.1] NH[AB1.2]   Primary care provider: Other Clinic, Md         Assessment and Plan:   nAkita Pham is a 89 year old female with a past medical history  of IBS, chronic lymphedema, chronic pain, atrial fibrillation s/p PPM, PUD, PE, epidural hematoma 07/2016 who is admitted to medicine for abdominal pain, AMS, CT consistent with[AB1.1] colonic distention[AB1.2].     #AMS:[AB1.1] Lethargy, altered. Lytes stable, creat stable, ammonia normal, procal <0.05, TSH WNL, glucose 146, hgb stable, platelets stable, WBC count mildly elevated. VBG consistent with respiratory alkalosis.[AB1.2] Head CT w/o acute intracranial pathology, stable cystic lesion in right parotic gland.[AB1.1] VSS.[AB1.2] Likely due to underlying process[AB1.1] as below.[AB1.2]   -Neuro checks  -Continue to treat underlying GI, cardiology and likely infetious causes as below  -UC and BC in process[AB1.1]  -Consider EEG, but less likely seizure activity[AB1.2]    #Colonic distentio[AB1.1]n with suspected pseudo-obstruction[ZK1.1]: CT abd/pelvis w/ circumferential thickening of the rectum, severe air distended lops of colon w/ large amt of stool in rectum concerning for early distal colonic obstruction or colonic ileus (Brenda's syndrome), no evidence of ischemia, non dilated small bowel loops. Noted atherosclerotic disease w/ severe stenosis of celiac axis, superior mesenteric arter origins, moderate stenosis of the left common iliac artery. Patient w/ chronic constipation likely 2/2 narcotics. LA mildly elevated at 2.5 on presentation which improved w/ 1 L of IVF bolus.[AB1.1] Abdominal exam w/ normoactive bowel sounds, soft, no peritoneal signs.[AB1.2]   -GI and CRS consulted, appreciate recs  -Monitor lytes[AB1.1], replace per protocol[AB1.2]  -Cdiff in process  -Serial abdominal exams  -NPO  -NG tube placed when patient amenable, family declined restraints to place NG understanding risk of perforation, decompensation  -Rectal tube ordered  -Turn q30 minutes  -Avoid narcotics and anticholinergics  -Consider abdominal US in AM to evaluate biliary tree    #[AB1.1]Acute exacerbation of heart failure  with reduced EF, new onset[ZK1.1]  #Elevated troponin  Echocardiogram w/ mild LV dilation, severe diffuse hypokinesis, EF of 15-20%, normal RV function, dilation of IVC w/ abnormal respiratory variation, no pericardial effusion, moderate tricuspid insufficiency, mild aortic valve sclerosis, [place] mitral [place] [address]. CT chest w/ elevated RH dysfunction, dilated pulmonary artery, moderate pulmonary edema and new small bilateral pleural effusions R>L. Trop 0.757 in ED, up to 0.962 on admission. Cardiology evaluated, felt likely 2/2 decompensated HF vs. Ongoing ischemia vs. Stress cardiomyopathy.   -Cardiology consulted, appreciate recs  -Diurese w/ IV lasix 40 mg x1[AB1.1]  -Tele, continuous pulse ox  -Trops x2 tonight[AB1.2]  -Strict Is/Os  -Daily weights  -Lisinopril 2.5 mg BID  -Continue metoprolol    #SA node dysfunction, Atrial fibrillation w/ RVR: s/p PPM placement. PTA on rate control w/ metoprolol. On aspirin for anti platelet. Chads vasc score of 5 (age, CHF, HTN, sex). Warfarin DCed in 07/2016 after an epidural hematoma.[AB1.1] HR in 90s-1 teens.[AB1.2]   -Cardiology consulted, appreciate recs  -Contniue rate control with metoprolol for now  -Diurese w/ 40 mg iv IF lasix per cards  -Tele  -HIV and TSH ordered    #Abnormal UA: UA w/ 5 ketones, elevated specific gravity, 100 protein, positive nitrite and LE, microscopy w/ 5 WBC, few bacteria. Past cultures in 07/2016, 08/2014, 12/2009 w/ Ecoli S to rocephin. Afebrile, WBC count mildly elevated but procal negative, WBC could be reactive to acute process. S/P 1 g of rocephin and placement of larson in ED.   -Follow UC  -Will not give additional antibiotics at this time    #Chronic PE: Not on AC 2/2 recent epidural hematoma. No new emboli noted.  -Continue conservative managment      #Hypothyroidism: On synthroid PTA.[AB1.1] TSH WNL.[AB1.2]   -Continue current dose    #Chronic pain: On[AB1.1] gabapentin 200 qhs,[AB1.2] oxycodone[AB1.1] 5 mg  "TID scheduled w/ 5-10 mg q4 PRN for moderate to severe pain[AB1.2] PTA.[AB1.1] Bowel regimen w/ miralax BID, senna-colace 2 tabs BID, PRN lactulose, dulcolax suppositories. Unclear when last BM was.[AB1.2]   -Hold[AB1.1] narcotics[AB1.2] w/ AMS and colonic distention[AB1.1]  -WIll hold bowel regimen for now, consider addition per GI recs[AB1.2]     CODE: DNR/DNI[AB1.1]- confirmed with son and DIL that this is patients wish[AB1.3],[AB1.1] when asked directly patient noted that she[AB1.3] would not want surgery[AB1.1]- son prefers us to call him in if any clinical changes or any concerns arise[AB1.3].[AB1.2]  FEN: NPO, gentle IVF boluses PRN  DVT: Mechanical, SCDs  LINES: Right PIV  Disposition/Admission Status: >2 midnights for ogilvies.     Plan of care was discussed with attending physician, Dr. Lewis.     Carolyn GARCIA-C  Hospitalist Service[AB1.1]    10 point ROS is negative except as mentioned in the HPI  PMH, PSH, medications, SH, and FMH were reviewed and confirmed by myself    Labs and VS reviewed[ZK1.1]    BP (!) 127/91 (BP Location: Left arm)  Pulse 115  Temp 97.4  F (36.3  C) (Axillary)  Resp 18  Ht 1.651 m (5' 5\")  Wt 91.3 kg (201 lb 3.2 oz)  SpO2 94%  Breastfeeding? No  BMI 33.48 kg/m2[ZK1.2]  Elderly woman, ill-appearing, clearly uncomfortable  Marked abdominal distension. Seen bedside with surgery and GI, rectal exam with lots of flatulence and liquid stool    I saw and evaluated this patient with RADHA Larry. I agree with his/her assessment of Mrs Pham. 89 yo woman with multiple chronic medical problems who presented to the ED today after she was noted this AM to have altered mental status and abdominal distension. She has a prior history of colonic pseudo-obstruction and her symptoms seem fairly consistent with this diagnosis presently. She is largely immobile and on opiods. Appears to be self-decompressing, no need for emergent flex sig. Will place rectal tube, serial abdominal exams, and " turn frequently. Interestingly noted to have rather pronounced new onset heart failure with mild hypoxic respiratory failure. Seen by cardiology, appreciate assistance. Plan or cautious diuresis and beta-blocker therapy. Will star low dose ACE. Etiology here uncertain, given age and co morbidities, hopefully we can manage conservatively.     Damir Lewis MD[ZK1.1]             Chief Complaint:[AB1.1]   Abdominal pain[AB1.2]         HPI:   History is obtained from the family, NH documentation and medical record.     The patient is unable to tell history. She denies abdominal pain when asking, but then when palpating did yell out in pain.    NH documentation note she was off this AM, complained of abdominal pain and distention. EMS was called to to [facility].[AB1.1]     Her daughter in law notes that she has issues with chronic constipation. She notes the patient is on narcotics. She has been immobile and bedbound for the last few years, refusing to get out of bed. She has wounds 2/2 this.[AB1.2]     Son notes that his mother is usually awake, somewhat sleepy but enjoys activities such as reading the [place], using the Internet and watching TV. He believes she is quite functional for her age.[AB1.4]          Review of Systems:   A 10 point ROS was performed and negative unless otherwise noted in HPI.          Past Medical History:   Reviewed and updated in Epic.[AB1.1]   Past Medical History   Diagnosis Date     Acute, but ill-defined, cerebrovascular disease 1975     incontinent     Cardiac dysrhythmia, unspecified      sees Dr. Marcus     Cardiac pacemaker in situ 1999     Chronic peptic ulcer, unspecified site, without mention of hemorrhage, perforation, or obstruction      Diaphragmatic hernia without mention of obstruction or gangrene      Edema      Esophageal reflux 12/13/2006     Essential hypertension, benign      Heart disease, unspecified      Irritable bowel syndrome      Lymphedema      Myalgia and myositis,  unspecified      Open wound of heel 3/31/2014     Right heel.     Osteoarthrosis, unspecified whether generalized or localized, unspecified site      Dr. Ashly Marrero     Other abnormal glucose 2005     Other chronic pulmonary heart diseases      Rotator cuff (capsule) sprain      left, Dr. Paredes didn't think surgery worth risks     Unspecified hypothyroidism      Unspecified hypothyroidism      Unspecified sleep apnea      Urinary incontinence 8/1/2014[AB1.5]            Past Surgical History:   Reviewed and updated in Epic.[AB1.1]   Past Surgical History   Procedure Laterality Date     Surgical history of -        Ankle fracture Tib     Surgical history of -        TIA'a     Surgical history of -        Thyroiditis x3 remote     Surgical history of -        Ulnar nerve transpostion     Surgical history of -   1999     Pacemaker (sinus phases, bradycardia)     Surgical history of -        L atrial pacer     Surgical history of -        appy     Surgical history of -        Benign breast biopsy     Endoscopic release carpal tunnel  10/26/2011     Procedure:ENDOSCOPIC RELEASE CARPAL TUNNEL; RIGHT ENDOSCOPIC CARPAL TUNNEL RELEASE, RIGHT LONG AND RING A-1 JAMIL RELEASE; Surgeon:KATE MARRERO; Location: [facility]     Release trigger finger  10/26/2011     Procedure:RELEASE TRIGGER FINGER; Surgeon:KATE MARRERO; Location: [facility]     Incision and drainage sacral wound, combined N/A 8/28/2014     Procedure: COMBINED INCISION AND DRAINAGE SACRAL WOUND;  Surgeon: Osorio Morgan MD;  Location: [facility] OR[AB1.5]            Social History:   Reviewed and updated in Epic.[AB1.1]   Social History     Social History     Marital status:      Spouse name: N/A     Number of children: N/A     Years of education: N/A     Occupational History     Not on file.     Social History Main Topics     Smoking status: Former Smoker     Years: 10.00     Quit date: 5/1/1966     Smokeless tobacco: Never Used  "    Alcohol use 28.0 oz/week      Comment: 4 oz of wine  each evening     Drug use: No     Sexual activity: Not Currently     Partners: Male     Other Topics Concern     Parent/Sibling W/ Cabg, Mi Or Angioplasty Before 65f 55m? No     Social History Narrative    Daughter in law does grocery shopping    Son helps her around the home if needed.    One daughter in [place], [place], [place] - one daughter has passed away.[AB1.5]   Lives in nursing home. Non ambulatory for ~2 years per family.          Family History:   Reviewed and updated in Epic.[AB1.1]   Family History   Problem Relation Age of Onset     Family History Negative Mother      HEART DISEASE Father      HEART DISEASE Sister      CHF     HEART DISEASE Brother      multiple MI     Respiratory Brother      COPD     HEART DISEASE Brother      MI     HEART DISEASE Brother       during open heart surgery     DIABETES No family hx of      Hypertension No family hx of[AB1.5]             Allergies:[AB1.1]      Allergies   Allergen Reactions     Cortisone Other (See Comments)     Mental changes      Nuts Anaphylaxis[AB1.5]            Medications:[AB1.1]     (Not in a hospital admission)[AB1.6]          Physical Exam:[AB1.1]   Blood pressure 110/67, pulse 115, temperature 97.6  F (36.4  C), temperature source Axillary, resp. rate 12, height 1.651 m (5' 5\"), weight 91.3 kg (201 lb 3.2 oz), SpO2 99 %, not currently breastfeeding.[AB1.7]  GENERAL: Alerts to voice, oriented to DIL, not oriented to time, place.   HEENT: Anicteric sclera. Mucous membranes dry and without lesions.   CV: Irregularly irregular. S1, S2. No murmurs appreciated.   RESPIRATORY: Effort normal on 2 LPM via NC. Lungs CTAB with no wheezing, rales, rhonchi.   GI: Abdomen soft and distended, bowel sounds hypoactive. Diffuse tenderness to palpation, no rebound, guarding.   MUSCULOSKELETAL: No joint swelling or tenderness. Moves all extremities.   NEUROLOGICAL: No focal deficits.   EXTREMITIES: 2+ bilateral LE " edema. Intact bilateral pedal pulses.   : Pennington in place.   SKIN: No jaundice. No rashes.     Lines/Tubes/Drains:   Peripheral IV 07/14/16 Left Hand (Active)   Number of days:232       Peripheral IV 03/03/17 Right Upper forearm (Active)   Number of days:0       Peripheral IV 03/03/17 Left Lower forearm (Active)   Number of days:0            Data:   I personally reviewed the following studies:  CMP, lipase, procalcitonin, lactic acid, troponin, BNP, glucose, VBG, CBC, INR[AB1.1]  Unresulted Labs Ordered in the Past 30 Days of this Admission     Date and Time Order Name Status Description    3/3/2017 1328 Urine Culture Aerobic Bacterial Preliminary     3/3/2017 1109 Blood culture Preliminary     3/3/2017 1109 Blood culture Preliminary[AB1.5]         ECHO 03/03  Interpretation Summary  Mild left ventricular dilation is present.  The Ejection Fraction is estimated at 15-20%.  Right ventricular function, chamber size, wall motion, and thickness are  normal.  Dilation of the inferior vena cava is present with abnormal respiratory  variation in diameter.  No pericardial effusion is present.    CT Abd/pelvis 03/03  IMPRESSION:   1. 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 No findings suggestive of  ischemia. Non dilated small bowel loops.   a. Circumferential thickening of the rectum, could represent colitis.  Recommend clinical correlation and possible additional evaluation of  rectum.   2. Unchanged severe common bile duct dilatation with mildly improved  intrahepatic biliary duct dilatation. Possible trace amount of  pneumobilia near the ampulla of Vater. Cannot exclude an obstructing  biliary or pancreatic head mass. This could be further evaluated with  MRCP or ERCP.   3. 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  4. Large sliding-type hiatal hernia.  5. Please see CT pulmonary angiogram from the same day for details  regarding the chest.      [Urgent Result: Rectal stool with air distended colon]    CT chest PE 03/03      IMPRESSION:   1. No acute pulmonary embolism. 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  2. Evidence of heart failure with elevated right heart dysfunction,  dilated pulmonary artery and moderate pulmonary edema and new small  bilateral pleural effusions right greater than left.[AB1.1]     Revision History        User Key Date/Time User Provider Type Action    > ZK1.2 3/3/2017 11:02 PM Guru Lewis MD Physician Addend     ZK1.1 3/3/2017 10:47 PM Guru Lewis MD Physician      [N/A] 3/3/2017 10:05 PM Carolyn Larry PA-C Physician Assistant Addend     AB1.4 3/3/2017  9:17 PM Carolyn Larry PA-C Physician Assistant Addend     AB1.3 3/3/2017  9:14 PM Carolyn Larry PA-C Physician Assistant Addend     AB1.6 3/3/2017  8:27 PM Carolyn Larry PA-C Physician Assistant Sign     AB1.2 3/3/2017  8:14 PM Carolyn Larry PA-C Physician Assistant      AB1.7 3/3/2017  4:56 PM Carolyn Larry PA-C Physician Assistant      AB1.5 3/3/2017  4:47 PM Carolyn Larry PA-C Physician Assistant      AB1.1 3/3/2017  4:46 PM Carolyn Larry PA-C Physician Assistant                      Discharge Summaries      Discharge Summaries by Carin Del Angel MD at 3/6/2017 11:27 AM     Author:  Carin Del Angel MD Service:  Hospitalist Author Type:  Physician    Filed:  3/6/2017 11:45 AM Date of Service:  3/6/2017 11:27 AM Note Created:  3/6/2017 11:27 AM    Status:  Signed :  Carin Del Angel MD (Physician)         [facility] Discharge Summary    Ankita Pham MRN# [id number]   Age: 89 year old Date of Birth:  8/3/1921     Date of Admission:  3/3/2017  Date of Discharge::[TK1.1]  3/6/2017[TK1.2]  Admitting Physician:  Guru Lewis MD  Discharge Physician:           Dr Rashad Rosas MD   Primary Physician: Viola Mathew Md  Transferring Facility: [facility]            Discharge Diagnosis:   Principle diagnosis:   Metabolic encephalopathy  Sub acute functional small bowel obstruction    Acute on chronic systolic heart failure     Secondary diagnoses:  A Fib with RVR  UTI  Chronic PE  Chronic pain  Hypothyroidism          Procedures:   No procedures performed during this admission         Allergies:[TK1.1]      Allergies   Allergen Reactions     Cortisone Other (See Comments)     Mental changes      Nuts Anaphylaxis[TK1.3]               Discharge Medications:[TK1.1]     Current Discharge Medication List      START taking these medications    Details   lisinopril (PRINIVIL/ZESTRIL) 5 MG tablet Take 1 tablet (5 mg) by mouth 2 times daily  Qty: 30 tablet    Associated Diagnoses: Hypertension goal BP (blood pressure) < 140/90      cefpodoxime (VANTIN) 200 MG tablet Take 1 tablet (200 mg) by mouth 2 times daily for 3 days  Qty: 6 tablet, Refills: 0    Associated Diagnoses: Urinary tract infection without hematuria, site unspecified      furosemide (LASIX) 40 MG tablet Take 1 tablet (40 mg) by mouth daily  Qty: 30 tablet    Associated Diagnoses: Cardiac pacemaker in situ         CONTINUE these medications which have CHANGED    Details   oxyCODONE (ROXICODONE) 5 MG IR tablet Take 1 tablet (5 mg) by mouth every 8 hours as needed for moderate to severe pain  Qty: 40 tablet, Refills: 0    Associated Diagnoses: Epidural hematoma (H)      LORazepam (ATIVAN) 1 MG tablet Take 0.5 tablets (0.5 mg) by mouth daily as needed for anxiety  Qty: 30 tablet, Refills: 0    Associated Diagnoses: Delirium      senna-docusate (SENOKOT-S;PERICOLACE) 8.6-50 MG per tablet Take 2 tablets by mouth  2 times daily  Qty: 100 tablet    Comments: Hold for loose stools  Associated Diagnoses: Slow transit constipation      polyethylene glycol (MIRALAX/GLYCOLAX) Packet Take 17 g by mouth daily  Qty: 7 packet    Comments: Hold for loose stools  Associated Diagnoses: Slow transit constipation         CONTINUE these medications which have NOT CHANGED    Details   METOPROLOL SUCCINATE ER PO Take 12.5 mg by mouth At Bedtime      SIMETHICONE-80 PO Take 80 mg by mouth 2 times daily as needed for intestinal gas in addition to scheduled doses      aspirin  MG tablet Take 325 mg by mouth daily   Qty: 40 tablet    Associated Diagnoses: Other specified cardiac dysrhythmias(427.89)      Gabapentin (NEURONTIN PO) Take 200 mg by mouth daily      Citalopram Hydrobromide (CELEXA PO) Take 10 mg by mouth daily      lactulose (CHRONULAC) 10 GM/15ML solution Take 30 mLs by mouth daily as needed for constipation      acetaminophen (TYLENOL) 500 MG tablet Take 2 tablets (1,000 mg) by mouth 3 times daily  Qty: 60 tablet, Refills: 1    Associated Diagnoses: Chronic pain syndrome      magnesium oxide (MAG-OX) 400 MG tablet Take 1 tablet (400 mg) by mouth daily  Qty: 60 tablet, Refills: 0    Associated Diagnoses: Ileus (H)      albuterol (2.5 MG/3ML) 0.083% nebulizer solution Take 1 vial (2.5 mg) by nebulization every 6 hours as needed for shortness of breath / dyspnea or wheezing  Qty: 360 mL    Associated Diagnoses: Shortness of breath      bisacodyl (DULCOLAX) 10 MG suppository Place 1 suppository (10 mg) rectally daily as needed for constipation  Qty: 30 suppository    Associated Diagnoses: Ileus (H)      omeprazole 20 MG tablet Take 1 tablet (20 mg) by mouth 2 times daily  Qty: 30 tablet    Associated Diagnoses: Abdominal pain, other specified site      potassium chloride SA (K-DUR,KLOR-CON M) 20 MEQ tablet Take 1 tablet (20 mEq) by mouth daily  Qty: 60 tablet    Associated Diagnoses: Ileus (H)      Ondansetron HCl (ZOFRAN PO) Take 4  mg by mouth every 6 hours as needed for nausea or vomiting      multivitamin  with lutein (OCUVITE WITH LTEIN) CAPS Take 1 capsule by mouth daily  Refills: 0    Associated Diagnoses: Physical deconditioning      hypromellose (ARTIFICIAL TEARS) 0.5 % SOLN Place 1 drop into both eyes 3 times daily      levothyroxine (SYNTHROID, LEVOTHROID) 75 MCG tablet Take 1 tablet (75 mcg) by mouth daily  Qty: 90 tablet, Refills: 3    Associated Diagnoses: Unspecified hypothyroidism      ascorbic acid 500 MG TABS Take 1 tablet (500 mg) by mouth daily  Qty: 30 tablet[TK1.2]                   Consultations:   Consultation during this admission received from cardiology, gastroenterology and colon and rectal surgery           Brief History of Presenting Illness:   Patient was unable to give history on admission on 3/3/2017  NH documentation note she was off that morning, complained of abdominal pain and distention. EMS was called to to AMS.      Her daughter in law notes that she has issues with chronic constipation. She notes the patient is on narcotics. She has been immobile and bedbound for the last few years, refusing to get out of bed. She has wounds 2/2 this.      Please see detailed H&P by Dr Lewis for further details           Hospital Course:   Ankita Pham is a 89 year old female with a past medical history of IBS, chronic lymphedema, chronic pain, atrial fibrillation s/p PPM, PUD, PE, epidural hematoma 07/2016 who is admitted to medicine for abdominal pain, AMS, CT consistent with colonic distention.     #Metabolic Encephalopathy( Resolved):   Initially manifested by lethargy and disorientation  Thought to be due to the combination of pain medications, UTI as well as acute pain from sub acute bowel obstruction  This has completely resolved now  Was on ceftriaxone for UTI. Given the ability to tolerate orals, will change to oral Vantin to complete a total of 5 days of Abx          #Colonic distention with  pseudo-obstruction:  CT abd/pelvis w/ circumferential thickening of the rectum, severe air distended lops of colon w/ large amt of stool in rectum concerning for early distal colonic obstruction or colonic ileus (Brenda's syndrome), no evidence of ischemia, non dilated small bowel loops. Noted atherosclerotic disease w/ severe stenosis of celiac axis, superior mesenteric arter origins, moderate stenosis of the left common iliac artery. Patient w/ chronic constipation likely 2/2 narcotics. LA mildly elevated at 2.5 on presentation which improved w/ 1 L of IVF bolus. Abdominal exam w/ normoactive bowel sounds, soft, no peritoneal signs.   -GI and CRS consulted, appreciate recs  -Monitor lytes, replace per protocol  -Cdiff is negative   -Serial abdominal exams. Appears to be getting better evidenced by improvement in pain and presence of bowel movements   Was initiated on clear liquids and tolerated to full liquid diets and regular diet at discharge       #Acute exacerbation of heart failure with reduced EF, new onset  #Elevated troponin  Echocardiogram w/ mild LV dilation, severe diffuse hypokinesis, EF of 15-20%, normal RV function, dilation of IVC w/ abnormal respiratory variation, no pericardial effusion, moderate tricuspid insufficiency, mild aortic valve sclerosis, mild mitral annular [place]. [place] of [address]. CT chest w/ elevated RH dysfunction, dilated pulmonary artery, moderate pulmonary edema and new small bilateral pleural effusions R>L. Trop 0.757 in ED, up to 0.962 on admission. Cardiology evaluated, felt likely 2/2 decompensated HF vs. Ongoing ischemia vs. Stress cardiomyopathy.   -Cardiology consulted, appreciate recs  -Diuresed w/ IV lasix 40 mg x 2  And transitioned to 40 mg of lasix daily per cardiology team recommendations  -Daily weights ( Trending down)  -Continue metoprolol and Lisinopril increased to 5 mg BID   -She will follow up with [facility] clinic with [facility] in 1 month time. Referral has  "already been made       #SA node dysfunction, Atrial fibrillation w/ RVR:  s/p PPM placement. PTA on rate control w/ metoprolol. On aspirin for anti platelet. Chads vasc score of 5 (age, CHF, HTN, sex). Warfarin DCed in 07/2016 after an epidural hematoma. HR in 90s-1 teens.   -Cardiology consulted, appreciate recs  -Contniue rate control with metoprolol for now   -S/P 2 dose of 40 mg IV lasix.   -Tele  -Lymphedema consult       #Abnormal UA:  UA w/ 5 ketones, elevated specific gravity, 100 protein, positive nitrite and LE, microscopy w/ 5 WBC, few bacteria. Past cultures in 07/2016, 08/2014, 12/2009 w/ Ecoli S to rocephin. Afebrile, WBC count mildly elevated but procal negative, WBC could be reactive to acute process. S/P 1 g of rocephin and placement of larson in ED.   Change to oral vantin on 3/5 as patient tolerating orals and E coli sensitive to cephalosporins          #Chronic PE:   Not on AC 2/2 recent epidural hematoma. No new emboli noted.  -Continue conservative managment       #Hypothyroidism:   On synthroid PTA. TSH WNL.   -Continue current dose      #Chronic pain:  On gabapentin 200 qhs, oxycodone 5 mg TID scheduled w/ 5-10 mg q4 PRN for moderate to severe pain PTA. Bowel regimen w/ miralax BID, senna-colace 2 tabs BID, PRN lactulose, dulcolax suppositories.   Narcotics were held for the entire stay here. This begs to re-review narcotic regimen in this patient. We have discontinued scheduled narcotics and changed to PRN at discharge  Bowel regimen to resume on discharge ( please hold for loose stools)                PROGRESS ON DISCHARGE DAY   Feels well today. Denies chest pain/ SOB  Tolerating full liquid diet and graduated to regular adult diet  Denies abdominal pain  Denies nausea  Wants to be able to go back to NH today[TK1.1]       /89  Pulse 103  Temp 97.8  F (36.6  C) (Oral)  Resp 16  Ht 1.651 m (5' 5\")  Wt 84.8 kg (187 lb)  SpO2 94%  Breastfeeding? No  BMI 31.12 kg/m2[TK1.3]    CVS: Normal " Heart sounds   RS: Clear breath sounds bilaterally   Abdomen: Soft and non tender. Normal bowel sounds.   Neuro: Alert and orientated X 3          Pending Tests at Discharge:[TK1.1]     Unresulted Labs Ordered in the Past 30 Days of this Admission     Date and Time Order Name Status Description    3/3/2017 1109 Blood culture Preliminary     3/3/2017 1109 Blood culture Preliminary[TK1.3]                  Discharge instructions and Follow up:[TK1.1]       Discharge Procedure Orders  [facility] REFERRAL     General info for SNF   Order Comments: Length of Stay Estimate: Long Term Care  Condition at Discharge: Improving  Level of care:skilled   Rehabilitation Potential: Poor  Admission H&P remains valid and up-to-date: Yes  Recent Chemotherapy: N/A  Use Nursing Home Standing Orders: Yes     Mantoux instructions   Order Comments: Give two-step Mantoux (PPD) Per Facility Policy Yes     Reason for your hospital stay   Order Comments: Encephalopathy and subacute small bowel obstruction     Daily weights   Order Comments: Call Provider for weight gain of more than 2 pounds per day or 5 pounds per week.     Pennington catheter   Order Comments: To straight gravity drainage. Change catheter every 2 weeks and PRN for leaking or decreased uring output with signs of bladder distention. DO NOT change catheter without a specific MD order IF diagnosis of benign prostatic hypertrophy (BPH), neurogenic bladder, or other urological conditions     Follow Up and recommended labs and tests   Order Comments: Follow up with NH physician in 1 week of discharge  Please follow up with Cardiology [facility] clinic within 4 weeks of discharge     Weight bearing status   Order Comments: Patient has stopped getting out of bed or sitting in chair.  All therapy to be confined to bed bound exercises, unless we can re -evaluate her to see if we can start getting out of bed with assistance.  No contraindication for weight bearing     Activity - Up  with nursing assistance   Order Comments: Patient is largely bed bound. Encourage with PT to get out if possible   Order Specific Question Answer Comments   Is discharge order? Yes      DNR/DNI     Physical Therapy Adult Consult   Order Comments: Evaluate and treat as clinically indicated.    Reason: Deconditioning     Occupational Therapy Adult Consult   Order Comments: Evaluate and treat as clinically indicated.    Reason:  Deconditioning     Occupational Therapy Adult Consult   Order Comments: Evaluate and treat as clinically indicated.    Reason: Lymphedema therapy     Oxygen - Nasal cannula   Order Comments: 2  Lpm by nasal cannula to keep O2 sats 88% or greater.  Please use incentive spirometer if patient is not comfortable with Oxygen     Pneumatic Compression Device    Order Comments: Bilateral calf. Remove 30 mins BID.     Advance Diet as Tolerated   Order Comments: Follow this diet upon discharge: Orders Placed This Encounter     Fluid restriction 1500 ML FLUID     Regular Diet Adult   Order Specific Question Answer Comments   Is discharge order? Yes[TK1.3]                Discharge Disposition:   Discharged to NH          Time spent : 35  mts total with patient and coordination of care  Discussed with monik Schofield about discharge plan on phone        Dr NBA Rosas MD  Hospitalist ( Internal medicine)  Pager: [phone][TK1.1]       Revision History        User Key Date/Time User Provider Type Action    > TK1.3 3/6/2017 11:45 AM Carin Del Angel MD Physician Sign     TK1.2 3/6/2017 11:28 AM Carin Del Angel MD Physician      TK1.1 3/6/2017 11:27 AM Carin Del Angel MD Physician                      Consult Notes      Consults by Damian Webber MD at 3/3/2017  2:03 PM     Author:  Damian Webber MD Service:  Cardiology Author Type:  Physician    Filed:  3/4/2017  9:36 PM Date of Service:  3/3/2017  2:03 PM Note Created:  3/3/2017  2:03 PM    Status:  Signed  :  Damian Webber MD (Physician)         Reason for consultation:  Elevated troponin and BNP.[NS1.1]    HPI:  Ms Pham is a 89 yo female with atrial fibrillation, sinus node dysfunction and AV block s/p PPM 1999 with generator exchange in 2007, epidural hematoma in 7/2016 in the setting of supratherapeutic INR,[NS1.2] history of[NS1.3] pulmonary embolism, systemic and pulmonary hypertension,[NS1.2] and[NS1.3] obstructive sleep apnea, who was brought to our emergency department from her nursing home because of altered mental status.  She reportedly complained of abdominal pain and distention to paramedics and the emergency department staff.[NS1.2]  To me her only complaint was right knee pain.  She denies dyspnea and chest pain currently and previously.  She is not feeling lightheaded.[NS1.3]    Troponin[NS1.2] is[NS1.3] 0.757 today.  It was 0.016 in 7/2016 when she presented with her epidural hemorrhage.  BNP[NS1.2] is[NS1.3] 20,000.    Her last echocardiogram in our medical record was in 2005 and showed normal LV size, mild concentric hypertrophy, LVEF 55% with apical wall motion abnormality, mildly dilated RV with mildly depressed function, moderate biatrial enlargement, mild MR and no AS.[NS1.2]  A repeat echocardiogram was performed today and has yet to be read but on my cursory review of images acquired at the bedside her LVEF has fallen significantly, to ~15 or 20%.    Review of systems could not be performed because she was too uncomfortable to participate with a thorough history.    Per her daughter-in-law, who accompanied her today, the patient is cognitively intact at baseline and enjoys performing the [employer] crossword puzzle.[NS1.3]      Past Medical History   Diagnosis Date     Acute, but ill-defined, cerebrovascular disease 1975     incontinent     Cardiac dysrhythmia, unspecified      sees Dr. Marcus     Cardiac pacemaker in situ 1999     Chronic peptic ulcer, unspecified site, without mention  of hemorrhage, perforation, or obstruction      Diaphragmatic hernia without mention of obstruction or gangrene      Edema      Esophageal reflux 12/13/2006     Essential hypertension, benign      Heart disease, unspecified      Irritable bowel syndrome      Lymphedema      Myalgia and myositis, unspecified      Open wound of heel 3/31/2014     Right heel.     Osteoarthrosis, unspecified whether generalized or localized, unspecified site      Dr. Ashly Marrero     Other abnormal glucose 2005     Other chronic pulmonary heart diseases      Rotator cuff (capsule) sprain      left, Dr. Paredes didn't think surgery worth risks     Unspecified hypothyroidism      Unspecified hypothyroidism      Unspecified sleep apnea      Urinary incontinence 8/1/2014     Past Surgical History   Procedure Laterality Date     Surgical history of -        Ankle fracture Tib     Surgical history of -        TIA'a     Surgical history of -        Thyroiditis x3 remote     Surgical history of -        Ulnar nerve transpostion     Surgical history of -   1999     Pacemaker (sinus phases, bradycardia)     Surgical history of -        L atrial pacer     Surgical history of -        appy     Surgical history of -        Benign breast biopsy     Endoscopic release carpal tunnel  10/26/2011     Procedure:ENDOSCOPIC RELEASE CARPAL TUNNEL; RIGHT ENDOSCOPIC CARPAL TUNNEL RELEASE, RIGHT LONG AND RING A-1 JAMIL RELEASE; Surgeon:KATE MARRERO; Location: [facility]     Release trigger finger  10/26/2011     Procedure:RELEASE TRIGGER FINGER; Surgeon:KATE MARRERO; Location: [facility]     Incision and drainage sacral wound, combined N/A 8/28/2014     Procedure: COMBINED INCISION AND DRAINAGE SACRAL WOUND;  Surgeon: Osorio Morgan MD;  Location: [facility] OR     Family History   Problem Relation Age of Onset     Family History Negative Mother      HEART DISEASE Father      HEART DISEASE Sister      CHF     HEART DISEASE Brother       "multiple MI     Respiratory Brother      COPD     HEART DISEASE Brother      MI     HEART DISEASE Brother       during open heart surgery     DIABETES No family hx of      Hypertension No family hx of      Social History     Social History     Marital status:      Spouse name: N/A     Number of children: N/A     Years of education: N/A     Occupational History     Not on file.     Social History Main Topics     Smoking status: Former Smoker     Years: 10.00     Quit date: 1966     Smokeless tobacco: Never Used     Alcohol use 28.0 oz/week      Comment: 4 oz of wine  each evening     Drug use: No     Sexual activity: Not Currently     Partners: Male     Other Topics Concern     Parent/Sibling W/ Cabg, Mi Or Angioplasty Before 65f 55m? No     Social History Narrative    Daughter in law does grocery shopping    Son helps her around the home if needed.    One daughter in [place], [place], [place] - one daughter has passed away.[NS1.4]       Prior to admission medications were reviewed and include:[NS1.2]   mg daily, furosemide 40 mg daily, KCl, metoprolol 25 mg BID, levothyroxine & lactulose.[NS1.3]     Allergies   Allergen Reactions     Cortisone Other (See Comments)     Mental changes      Nuts Anaphylaxis[NS1.4]         Examination:[NS1.2]  BP (!) 124/99  Pulse 115  Temp 97.6  F (36.4  C) (Axillary)  Resp 9  Ht 1.651 m (5' 5\")  Wt 91.3 kg (201 lb 3.2 oz)  SpO2 95%  Breastfeeding? No  BMI 33.48 kg/m2[NS1.4]  Elderly, obese  female lying in bed groaning.  She is alert and intermittently oriented enough to answer questions about her symptoms.  Breathing is not visible labored while she is lying flat.  Tachycardic, irregularly irregular rhythm.  Normal S1 and S2.  No murmur.  No rub.  Warm extremities with bilateral lower extremity lymphedema.  Lung sounds are entirely obscured by groaning.  Abdomen is tender and moderately distended but not rigid.  Not jaundiced.  Small areas of ecchymosis are present " "on both lower legs.[NS1.3]      Labs, imaging & procedures were reviewed:  Creatinine 0.51.  K 5 -> 4.1.  K was 4.1.  ALT 7, AST \"unsatisfactory\", bilirubin 0.6, alkphos 88.  UA -> 5 WBCs.[NS1.2]  Recent Labs   Lab Test  03/03/17   1103  07/15/16   1147  07/14/16   0600  07/14/16   0017   WBC  11.9*  9.3  10.9  11.4*   HGB  14.7  11.1*  11.4*  11.9   PLT  263  207  284  311[NS1.5]     CT chest:  1. No acute pulmonary embolism. Previously seen chronic pulmonary embolism in the right main pulmonary artery has improved  significantly.  2. Evidence of heart failure with elevated right heart dysfunction, dilated pulmonary artery and moderate pulmonary edema and new small bilateral pleural effusions right greater than left.    CT abdomen/pelvis:  1. Severe air distended loops of colon with large amount of stool in the rectum may represent early distal colonic obstruction, or less likely colonic ileus (Brenda's syndrome). No findings suggestive of ischemia. Non dilated small bowel loops.   a. Circumferential thickening of the rectum, could represent colitis. Recommend clinical correlation and possible additional evaluation of rectum.   2. Unchanged severe common bile duct dilatation with mildly improved intrahepatic biliary duct dilatation. Possible trace amount of  pneumobilia near the ampulla of Vater. Cannot exclude an obstructing biliary or pancreatic head mass. This could be further evaluated with MRCP or ERCP.   3. 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 Moderate stenosis of the left common iliac artery.  4. Large sliding-type hiatal hernia.  5. Please see CT pulmonary angiogram from the same day for details regarding the chest.       Assessment:[NS1.2]  We saw this 89 yo female with several chronic medical conditions including[NS1.3] atrial fibrillation, sinus node dysfunction and AV block s/p PPM 1999 with generator exchange in 2007, epidural hematoma " in 7/2016 in the setting of supratherapeutic INR,[NS1.2] history of[NS1.3] pulmonary embolism, systemic and pulmonary hypertension,[NS1.2] and[NS1.3] obstructive sleep apnea[NS1.2] for an elevated troponin and BNP upon her presentation to the emergency department with colonic distention and abdominal pain.    1. Mildly elevated troponin without chest pain or convincing changes of ischemia on EKG, probably due to decompensated heart failure vs ongoing low grade ischemia vs stress cardiomyopathy although serial troponin measurements are necessary to exclude an evolving NSTEMI.  CT did not show propagation of her PE burden.  2. Acute systolic heart failure with EF of 15-20% per my quick assessment of bedside images during their acquisition, reduced from 55% on echocardiography in 2005.  She has not had prior evaluation for coronary artery disease in our system, but we recommend deferring evaluation with stress testing or coronary angiography until the resolution of her acute illness.  She was taking furosemide 40 mg daily prior to admission.  3. Atrial fibrillation, rate controlled with metoprolol, on ASA but not anticoagulated despite her CHADSVasc score of at least 5 (chf, htn, age, female) since an epidural hemorrhage 7 months ago.  4. History of chronic pulmonary embolism, seen again on CT today.  5. Pulmonary hypertension and RV dysfunction on prior echocardiography, not previously evaluated with right heart catheterization.  6. Colonic distention, being evaluated by internal medicine and surgery.[NS1.3]    Recommendations:[NS1.2]    Repeat troponin level and, if stable or barely more elevated, do not need to continue monitoring troponin in the absence of some clinical change suggestive of an acute coronary syndrome    Furosemide 40 mg IV once now, to be augmented with another 40 mg IV in a few hours if she does not respond by making 300-400 mL of urine in the next 2 hours.    We will follow up on her  echocardiogram.    Regarding neurohormonal therapy for heart failure:    Continue metoprolol, which will need to eventually be switched to the succinate (XL) formulation.    Start lisinopril 2.5 mg BID, to be titrated up as tolerated.    Check TSH & HIV[NS1.3]    I discussed the patient with Dr. Damian Webber.    Charbel Osman MD  Cardiovascular disease fellow[NS1.2]    STAFF CARDIOLOGIST: I supervised the cardiology fellow.  I interviewed the patient and family and examined the patient.  The active cardiac issues, include acute CHF decompensation (systolic dysfunction), pulmonary artery hypertension, elevate troponin (not necessarily ACS), and chronic AF, are outlined above.  I agree with the documentation above.  I would not recommend any aggressive treatment in this nonogenarian.   Treat conservatively with emphasis on CHF management, as above.    Damian Webber MD[AB1.1]     Revision History        User Key Date/Time User Provider Type Action    > AB1.1 3/4/2017  9:36 PM Damian Webber MD Physician Sign     NS1.3 3/3/2017  4:08 PM Charbel Osman MD Resident Sign     NS1.5 3/3/2017  2:52 PM Charbel Osman MD Resident      NS1.4 3/3/2017  2:46 PM Charbel Osman MD Resident      NS1.2 3/3/2017  2:36 PM Charbel Osman MD Resident      NS1.1 3/3/2017  2:03 PM Charbel Osman MD Resident             Consults by Mannie Lin MD at 3/3/2017  4:32 PM     Author:  Mannie Lin MD Service:  Colorectal Surgery Author Type:  Resident    Filed:  3/3/2017  4:56 PM Date of Service:  3/3/2017  4:32 PM Note Created:  3/3/2017  4:32 PM    Status:  Attested :  Mannie Lin MD (Resident)    Cosigner:  Kentrell Wood MD at 3/4/2017  7:57 AM        Attestation signed by Kentrell Wood MD at 3/4/2017  7:57 AM        Attestation:  Physician Attestation   IKentrell, saw this patient with the resident and agree with the resident s findings and plan  of care as documented in the resident s note.      I personally reviewed vital signs, medications, labs and imaging.    Key findings: Chronic constipation in the setting of overall frail status. Passing flatus and had BM today. Abd exam distended but no evidence of peritonitis. No clear evidence of LBO. Pt refuses invasive treatment at this time. No indication for operative treatment anyway. Likely to be admitted. If clinical status or pt/family wishes change, please call us. We will sign off at this time.     Kentrell Wood  Date of Service (when I saw the patient): Mar 3, 2017                               CR surgery consult note  Date 03/03/2017    We were asked by Dr Lovell of the ED to evaluate this patient with colonic distention.    HPI: Ms Pham is a 89 year old woman who presented to our ED from her nursing home with AMS and abdominal distention. She reports pain in her back and arms, as well as in her abdomen when specifically asked. She reports that she is passing gas and had a bowel movement this morning. She denies diarrhea or hard stools, however she has liquid stool present in her diaper. According to her daughter in law she has begun to refuse ambulating at her home, takes oxycodone for generalized pain, and had a similar episode about two years ago.       Past Medical History   Diagnosis Date     Acute, but ill-defined, cerebrovascular disease 1975     incontinent     Cardiac dysrhythmia, unspecified      sees Dr. Marcus     Cardiac pacemaker in situ 1999     Chronic peptic ulcer, unspecified site, without mention of hemorrhage, perforation, or obstruction      Diaphragmatic hernia without mention of obstruction or gangrene      Edema      Esophageal reflux 12/13/2006     Essential hypertension, benign      Heart disease, unspecified      Irritable bowel syndrome      Lymphedema      Myalgia and myositis, unspecified      Open wound of heel 3/31/2014     Right heel.     Osteoarthrosis,  unspecified whether generalized or localized, unspecified site      Dr. Ashly Marrero     Other abnormal glucose      Other chronic pulmonary heart diseases      Rotator cuff (capsule) sprain      left, Dr. Paredes didn't think surgery worth risks     Unspecified hypothyroidism      Unspecified hypothyroidism      Unspecified sleep apnea      Urinary incontinence 2014[PF1.1]     Past Surgical History   Procedure Laterality Date     Surgical history of -        Ankle fracture Tib     Surgical history of -        TIA'a     Surgical history of -        Thyroiditis x3 remote     Surgical history of -        Ulnar nerve transpostion     Surgical history of -        Pacemaker (sinus phases, bradycardia)     Surgical history of -        L atrial pacer     Surgical history of -        appy     Surgical history of -        Benign breast biopsy     Endoscopic release carpal tunnel  10/26/2011     Procedure:ENDOSCOPIC RELEASE CARPAL TUNNEL; RIGHT ENDOSCOPIC CARPAL TUNNEL RELEASE, RIGHT LONG AND RING A-1 JAMIL RELEASE; Surgeon:KATE MARRERO; Location: [facility]     Release trigger finger  10/26/2011     Procedure:RELEASE TRIGGER FINGER; Surgeon:KTAE MARRERO; Location: [facility]     Incision and drainage sacral wound, combined N/A 2014     Procedure: COMBINED INCISION AND DRAINAGE SACRAL WOUND;  Surgeon: Osorio Morgan MD;  Location: [facility] OR     Family History   Problem Relation Age of Onset     Family History Negative Mother      HEART DISEASE Father      HEART DISEASE Sister      CHF     HEART DISEASE Brother      multiple MI     Respiratory Brother      COPD     HEART DISEASE Brother      MI     HEART DISEASE Brother       during open heart surgery     DIABETES No family hx of      Hypertension No family hx of      Social History     Social History     Marital status:      Spouse name: N/A     Number of children: N/A     Years of education: N/A     Occupational  "History     Not on file.     Social History Main Topics     Smoking status: Former Smoker     Years: 10.00     Quit date: 5/1/1966     Smokeless tobacco: Never Used     Alcohol use 28.0 oz/week      Comment: 4 oz of wine  each evening     Drug use: No     Sexual activity: Not Currently     Partners: Male     Other Topics Concern     Parent/Sibling W/ Cabg, Mi Or Angioplasty Before 65f 55m? No     Social History Narrative    Daughter in law does grocery shopping    Son helps her around the home if needed.    One daughter in [place], [place], [place] - one daughter has passed away.     Current Facility-Administered Medications   Medication     0.9% sodium chloride infusion     Current Outpatient Prescriptions   Medication     OXYCODONE HCL PO     METOPROLOL SUCCINATE ER PO     SIMETHICONE-80 PO     oxyCODONE (ROXICODONE) 5 MG immediate release tablet     aspirin  MG tablet     Gabapentin (NEURONTIN PO)     Citalopram Hydrobromide (CELEXA PO)     lactulose (CHRONULAC) 10 GM/15ML solution     LORAZEPAM PO     acetaminophen (TYLENOL) 500 MG tablet     magnesium oxide (MAG-OX) 400 MG tablet     albuterol (2.5 MG/3ML) 0.083% nebulizer solution     bisacodyl (DULCOLAX) 10 MG suppository     polyethylene glycol (MIRALAX/GLYCOLAX) packet     senna-docusate (SENOKOT-S;PERICOLACE) 8.6-50 MG per tablet     omeprazole 20 MG tablet     potassium chloride SA (K-DUR,KLOR-CON M) 20 MEQ tablet     SIMETHICONE-80 PO     Ondansetron HCl (ZOFRAN PO)     multivitamin  with lutein (OCUVITE WITH LTEIN) CAPS     hypromellose (ARTIFICIAL TEARS) 0.5 % SOLN     levothyroxine (SYNTHROID, LEVOTHROID) 75 MCG tablet     ascorbic acid 500 MG TABS        Allergies   Allergen Reactions     Cortisone Other (See Comments)     Mental changes      Nuts Anaphylaxis[PF1.2]     ROS: limited by patient AMS      Exam:[PF1.1] /67  Pulse 115  Temp 97.6  F (36.4  C) (Axillary)  Resp 12  Ht 1.651 m (5' 5\")  Wt 91.3 kg (201 lb 3.2 oz)  SpO2 99%  Breastfeeding? No  BMI " 33.48 kg/m2[PF1.2]  Resting in bed, moaning intermittently  Answering simple questions appropriately  Abdomen soft, distended, tender to deep palpation, tympanitic  Rectal exam with normal sphincter tone, large volume soft-liquid stool in vault, no blood, moderate amount of gas and liquid stool discharged  Extremities edematous  Non-labored breathing      Labs and imaging reviewed[PF1.1]    Recent Labs  Lab 03/03/17  1248 03/03/17  1103   NA  --  140   POTASSIUM 4.1 5.0   CHLORIDE  --  103   CO2  --  24   ANIONGAP  --  12   GLC  --  146*   BUN  --  16   CR  --  0.51*   OMERO  --  8.4*   PROTTOTAL  --  6.8   ALBUMIN  --  3.1*   BILITOTAL  --  0.6   ALKPHOS  --  88   ALT  --  7[PF1.3]     CBC[PF1.1]  Recent Labs  Lab 03/03/17  1103   WBC 11.9*   RBC 4.66   HGB 14.7   HCT 45.5   MCV 98   MCH 31.5   MCHC 32.3   RDW 14.6   [PF1.3]     INR[PF1.1]  Recent Labs  Lab 03/03/17  1103   INR 1.11[PF1.3]       CT abdomen with distended colon without free air, circumferential rectal thickening, apparent air-fluid level in distended rectosigmoid. Stable CBD dilation.         A/P: This is a 89 year old woman who presented to our ED from her nursing home with some altered mental status and abdominal distention. No indication for surgical management at this time, patient has also clearly expressed that surgery would be against her wishes.    Her clinical picture is consistent with colonic dysfunction without evidence of mechanical obstruction. Agree with medical admission, cardiology consult for rising troponin, and GI involvement for work up/treatment of colon dysfunction. Will follow, page resident with questions/concerns.[PF1.1]    Mannie Lin[PF1.4]  [facility] surgery resident[PF1.1]     Revision History        User Key Date/Time User Provider Type Action    > PF1.4 3/3/2017  4:56 PM Mannie Lin MD Resident Sign     PF1.3 3/3/2017  4:40 PM Mannie Lin MD Resident      PF1.2 3/3/2017  4:38 PM  Mannie Lin MD Resident      PF1.1 3/3/2017  4:32 PM Mannie Lin MD Resident             Consults by Ambar Garcia APRN CNP at 3/3/2017  3:43 PM     Author:  Ambar Garcia APRN CNP Service:  Gastroenterology Author Type:  Nurse Practitioner    Filed:  3/3/2017  5:02 PM Date of Service:  3/3/2017  3:43 PM Note Created:  3/3/2017  3:32 PM    Status:  Signed :  Ambar Garcia APRN CNP (Nurse Practitioner)     Consult Orders:    1. GI LUMINAL ADULT IP CONSULT: Patient to be seen: Routine within 24 hrs; Call back #: [id number]; Abdominal pain and colonic distention; Consultant may enter orders: Yes [[id number]] ordered by Mannie Lovell MD at 03/03/17 1518                    GASTROENTEROLOGY CONSULTATION      Date of Admission:  3/3/2017          Chief Complaint:   We were asked by Mannie Lovell to evaluate this patient with abdominal pain and distended bowels seen on CT.            ASSESSMENT AND RECOMMENDATIONS:   Assessment:  Ankita Pham is a 89 year old female with a history of[MH1.1] PE on 325mg Aspirin, A. Fib with ventricular pacemaker, sick sinus syndrome, pulmonary hypertension, epidural hematoma, hypothyroidism, constipation, HTN, lymphedema, chronic PUD and incontinence who come in with altered mental status, abdominal pain and distension. Patient is afebrile. On admission potassium was 5.0, and WBC was 11.9.     Abdominal/pelvis CT on 3/3/17 showed severely distended loops of colon with large amount of stool in the rectum and since there was no rectal mass noted with rectal exam, patient might had constipation first that resulted pseudo-obstruction. There was unchanged severe common bile duct dilatation with mildly improved intrahepatic biliary duct dilatation when compared to the CT scan completed on 2/24/15.  Plan is conservative management and monitor patient closely, if no improvement in next 24-48 hours, we will consider evaluating patient  with flex sigmoidoscopy.[MH1.2]      Recommendations  - BMP, CBC, Lactate[MH1.1]  -[MH1.3] Stabilizing electrolytes ([MH1.2]potassium[MH1.3] around[MH1.2] 4.0,[MH1.3] Mg[MH1.2] 2.0 and calcium 8-10[MH1.3])[MH1.2]  - C. diff[MH1.1] stool[MH1.2] testing[MH1.1],[MH1.3] if positive please[MH1.2] treat[MH1.3] with 125mg of oral Vancomycin QID x14 days[MH1.2]  - Serial abdominal exams  - NPO, MIVF  -[MH1.1] Stat N[MH1.3]G tube[MH1.1] placement for[MH1.3] decompression - to intermittent suction on wall[MH1.1]   - Red rubber rectal tube for decompression[MH1.2]  -[MH1.1] R[MH1.2]otate patient in bed[MH1.1] frequently as patient has impaired mobility[MH1.2]  - Avoid all anticholinergics and narcotics as able, at the very least minimize.  -[MH1.1] We will consider enema tomorrow[MH1.3]  - Could consider abdominal ultrasound to evaluate biliary dilation although her LFTs are normal[MH1.2]    Gastroenterology follow up recommendations:[MH1.1] To be determined[MH1.2]    Patient care plan discussed with [MH1.1] Forrest[MH1.2], GI staff physician. Thank you for involving us in this patient's care. Please do not hesitate to contact the GI service with any questions or concerns.     Ambar Garcia CNP  Department of Gastroenterology   -------------------------------------------------------------------------------------------------------------------   History is obtained from the patient and the medical record.          History of Present Illness:   Ankita Pham is a 89 year old female with a history of[MH1.1] PE on 325mg Aspirin, A. Fib with ventricular pacemaker, sick sinus syndrome, pulmonary hypertension, epidural hematoma, hypothyroidism, constipation, HTN, lymphedema, chronic PUD and incontinence who come in with abdominal pain and distension. Patient is afebrile. On admission potassium was 5.0, and WBC was 11.9.  Abdominal/pelvis CT on 3/3/17 showed severely distended loops of colon with large amount of stool in the  rectum and since there was no rectal mass noted with rectal exam, patient might had constipation first that resulted pseudo-obstruction.  There was unchanged severe common bile duct dilatation with mildly improved intrahepatic biliary duct dilatation when compared to the CT scan completed on 2/24/15.    Patient living in care center facility and she does have impaired mobility. She has a recurrent constipation, which she receives oral bowel regimen for it. She did have small stool today.[MH1.2]        Past Medical History:   Reviewed and edited as appropriate[MH1.1]  Past Medical History   Diagnosis Date     Acute, but ill-defined, cerebrovascular disease 1975     incontinent     Cardiac dysrhythmia, unspecified      sees Dr. Marcus     Cardiac pacemaker in situ 1999     Chronic peptic ulcer, unspecified site, without mention of hemorrhage, perforation, or obstruction      Diaphragmatic hernia without mention of obstruction or gangrene      Edema      Esophageal reflux 12/13/2006     Essential hypertension, benign      Heart disease, unspecified      Irritable bowel syndrome      Lymphedema      Myalgia and myositis, unspecified      Open wound of heel 3/31/2014     Right heel.     Osteoarthrosis, unspecified whether generalized or localized, unspecified site      Dr. Ashly Palacios     Other abnormal glucose 2005     Other chronic pulmonary heart diseases      Rotator cuff (capsule) sprain      left, Dr. Paredes didn't think surgery worth risks     Unspecified hypothyroidism      Unspecified hypothyroidism      Unspecified sleep apnea      Urinary incontinence 8/1/2014[MH1.4]            Past Surgical History:   Reviewed and edited as appropriate[MH1.1]   Past Surgical History   Procedure Laterality Date     Surgical history of -        Ankle fracture Tib     Surgical history of -        TIA'a     Surgical history of -        Thyroiditis x3 remote     Surgical history of -        Ulnar nerve transpostion     Surgical  history of -   1999     Pacemaker (sinus phases, bradycardia)     Surgical history of -        L atrial pacer     Surgical history of -        appy     Surgical history of -        Benign breast biopsy     Endoscopic release carpal tunnel  10/26/2011     Procedure:ENDOSCOPIC RELEASE CARPAL TUNNEL; RIGHT ENDOSCOPIC CARPAL TUNNEL RELEASE, RIGHT LONG AND RING A-1 JAMIL RELEASE; Surgeon:KATE MARRERO; Location: [facility]     Release trigger finger  10/26/2011     Procedure:RELEASE TRIGGER FINGER; Surgeon:KATE MARRERO; [facility]: [facility]     Incision and drainage sacral wound, combined N/A 8/28/2014     Procedure: COMBINED INCISION AND DRAINAGE SACRAL WOUND;  Surgeon: Osorio Morgan MD;  Location: [facility] OR[MH1.4]            Previous Endoscopy:[MH1.1]   No results found for this or any previous visit.[MH1.4]         Social History:   Reviewed and edited as appropriate[MH1.1]  Social History     Social History     Marital status:      Spouse name: N/A     Number of children: N/A     Years of education: N/A     Occupational History     Not on file.     Social History Main Topics     Smoking status: Former Smoker     Years: 10.00     Quit date: 5/1/1966     Smokeless tobacco: Never Used     Alcohol use 28.0 oz/week      Comment: 4 oz of wine  each evening     Drug use: No     Sexual activity: Not Currently     Partners: Male     Other Topics Concern     Parent/Sibling W/ Cabg, Mi Or Angioplasty Before 65f 55m? No     Social History Narrative    Daughter in law does grocery shopping    Son helps her around the home if needed.    One daughter in [place], [place], [place] - one daughter has passed away.[MH1.4]            Family History:   Reviewed and edited as appropriate[MH1.1]  Family History   Problem Relation Age of Onset     Family History Negative Mother      HEART DISEASE Father      HEART DISEASE Sister      CHF     HEART DISEASE Brother      multiple MI     Respiratory Brother      COPD      HEART DISEASE Brother      MI     HEART DISEASE Brother       during open heart surgery     DIABETES No family hx of      Hypertension No family hx of[MH1.4]            Allergies:   Reviewed and edited as appropriate[MH1.1]     Allergies   Allergen Reactions     Cortisone Other (See Comments)     Mental changes      Nuts Anaphylaxis[MH1.4]            Medications:[MH1.1]     Current Facility-Administered Medications   Medication     0.9% sodium chloride infusion     Current Outpatient Prescriptions   Medication Sig     OXYCODONE HCL PO Take 5 mg by mouth 3 times daily     METOPROLOL SUCCINATE ER PO Take 12.5 mg by mouth At Bedtime     SIMETHICONE-80 PO Take 80 mg by mouth 2 times daily as needed for intestinal gas in addition to scheduled doses     oxyCODONE (ROXICODONE) 5 MG immediate release tablet Take 1-2 tablets (5-10 mg) by mouth every 4 hours as needed for moderate to severe pain     aspirin  MG tablet Take 325 mg by mouth daily      Gabapentin (NEURONTIN PO) Take 200 mg by mouth daily     Citalopram Hydrobromide (CELEXA PO) Take 10 mg by mouth daily     lactulose (CHRONULAC) 10 GM/15ML solution Take 30 mLs by mouth daily as needed for constipation     LORAZEPAM PO Take 0.25 mg by mouth daily as needed for anxiety     acetaminophen (TYLENOL) 500 MG tablet Take 2 tablets (1,000 mg) by mouth 3 times daily     magnesium oxide (MAG-OX) 400 MG tablet Take 1 tablet (400 mg) by mouth daily     albuterol (2.5 MG/3ML) 0.083% nebulizer solution Take 1 vial (2.5 mg) by nebulization every 6 hours as needed for shortness of breath / dyspnea or wheezing     bisacodyl (DULCOLAX) 10 MG suppository Place 1 suppository (10 mg) rectally daily as needed for constipation     polyethylene glycol (MIRALAX/GLYCOLAX) packet Take 17 g by mouth 2 times daily     senna-docusate (SENOKOT-S;PERICOLACE) 8.6-50 MG per tablet Take 2 tablets by mouth 2 times daily     omeprazole 20 MG tablet Take 1 tablet (20 mg) by mouth 2 times  "daily     potassium chloride SA (K-DUR,KLOR-CON M) 20 MEQ tablet Take 1 tablet (20 mEq) by mouth daily     SIMETHICONE-80 PO Take 80 mg by mouth 3 times daily     Ondansetron HCl (ZOFRAN PO) Take 4 mg by mouth every 6 hours as needed for nausea or vomiting     multivitamin  with lutein (OCUVITE WITH LTEIN) CAPS Take 1 capsule by mouth daily     hypromellose (ARTIFICIAL TEARS) 0.5 % SOLN Place 1 drop into both eyes 3 times daily     levothyroxine (SYNTHROID, LEVOTHROID) 75 MCG tablet Take 1 tablet (75 mcg) by mouth daily     ascorbic acid 500 MG TABS Take 1 tablet (500 mg) by mouth daily[MH1.5]             Review of Systems:   A complete review of systems was performed and is negative except as noted in the HPI           Physical Exam:[MH1.1]   BP (!) 124/99  Pulse 115  Temp 97.6  F (36.4  C) (Axillary)  Resp 9  Ht 1.651 m (5' 5\")  Wt 91.3 kg (201 lb 3.2 oz)  SpO2 95%  Breastfeeding? No  BMI 33.48 kg/m2[MH1.4]  Wt:[MH1.1]   Wt Readings from Last 2 Encounters:   03/03/17 91.3 kg (201 lb 3.2 oz)   07/15/16 84.3 kg (185 lb 13.6 oz)[MH1.4]      Constitutional:[MH1.1] Altered mental status. Patient moans with abdominal discomfort but[MH1.2] not dyspneic/diaphoretic, no acute distress  Eyes: Sclera anicteric/injected  Ears/nose/mouth/throat: Normal oropharynx without ulcers or exudate, mucus membranes moist, hearing intact  Neck: supple, thyroid normal size  CV:[MH1.1] RRR. Lymphedema in LE[MH1.2]  Respiratory: Unlabored breathing[MH1.1]. Diminished lung sound bilaterally[MH1.2]  Lymph: No axillary, submandibular, supraclavicular or inguinal lymphadenopathy  Abd: Nondistended, +bs, no hepatosplenomegaly, no peritoneal signs[MH1.1]. Generalized tenderness.[MH1.2]   Skin: warm, perfused, no jaundice  Neuro: AAO x 3, No asterixis  Psych: Normal affect  MSK: Normal gait         Data:   Labs and imaging below were independently reviewed and interpreted    BMP[MH1.1]  Recent Labs  Lab 03/03/17  1248 03/03/17  1103   NA  --  140 "   POTASSIUM 4.1 5.0   CHLORIDE  --  103   OMERO  --  8.4*   CO2  --  24   BUN  --  16   CR  --  0.51*   GLC  --  146*[MH1.4]     CBC[MH1.1]  Recent Labs  Lab 03/03/17  1103   WBC 11.9*   RBC 4.66   HGB 14.7   HCT 45.5   MCV 98   MCH 31.5   MCHC 32.3   RDW 14.6   [MH1.4]     INR[MH1.1]  Recent Labs  Lab 03/03/17  1103   INR 1.11[MH1.4]     LFTs[MH1.1]  Recent Labs  Lab 03/03/17  1248 03/03/17  1103   ALKPHOS  --  88   AST 13 Unsatisfactory specimen - hemolyzedNOTIFIED RN JONATHON KENYON @122 03.03.17 UUER. BY EK   ALT  --  7   BILITOTAL  --  0.6   PROTTOTAL  --  6.8   ALBUMIN  --  3.1*[MH1.4]      PANC[MH1.1]  Recent Labs  Lab 03/03/17  1103   LIPASE 55*[MH1.4]          Revision History        User Key Date/Time User Provider Type Action    > MH1.5 3/3/2017  5:02 PM Ambar Garcia APRN CNP Nurse Practitioner Sign     MH1.2 3/3/2017  4:14 PM Ambar Garcia APRN CNP Nurse Practitioner      MH1.3 3/3/2017  3:47 PM Ambar Garcia APRN CNP Nurse Practitioner      MH1.4 3/3/2017  3:33 PM Ambar Garcia APRN CNP Nurse Practitioner      MH1.1 3/3/2017  3:32 PM Ambar Garcia APRN CNP Nurse Practitioner                      Progress Notes - Physician (Notes for yesterday and today)      Progress Notes by Tameka Sanz MD at 3/6/2017  1:42 PM     Author:  Tameka Sanz MD Service:  Gastroenterology Author Type:  Fellow    Filed:  3/6/2017  1:44 PM Date of Service:  3/6/2017  1:42 PM Note Created:  3/6/2017  1:42 PM    Status:  Signed :  Tameka Sanz MD (Fellow)         Brief GI Note    Pt seen and evaluated this am, had large loose BM this am. Abdomen is soft and NT. D/C to facility planned today.    Cont Miralax, increase to BID as needed to achieve 1-2 soft BMs per day. Suppository or tap water enema PRN. Change position as often as possible.     Will sign off.     Tameka Sanz MD  GI Fellow[AT1.1]     Revision History        User Key Date/Time  "User Provider Type Action    > AT1.1 3/6/2017  1:44 PM Tameka Sanz MD Fellow Sign            Progress Notes by Rossy Henderson MD at 3/5/2017  2:09 PM     Author:  Rossy Henderson MD Service:  Cardiology Author Type:  Physician    Filed:  3/5/2017  4:39 PM Date of Service:  3/5/2017  2:09 PM Note Created:  3/5/2017  2:09 PM    Status:  Signed :  Rossy Henderson MD (Physician)         Cardiology Progress Note    Events and interval changes in past 24 hours:[PP1.1]   She denies orthopnea, dyspnea, chest pain or palpitations  Net -2.5L since admission. Yesterday had 2L UO[PP1.2]    OBJECTIVE FINDINGS:  /89  Pulse 98  Temp 97.4  F (36.3  C) (Oral)  Resp 18  Ht 1.651 m (5' 5\")  Wt 84.8 kg (187 lb)  SpO2 97%  Breastfeeding? No  BMI 31.12 kg/m2    Gen: Patient is AOx3, in NAD. Appears comfortable.    CV:[PP1.1] irregular, JVP 8cm, S1 &S2, no murmurs, edema in legs nonpitting. 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 Otherwise clear[PP1.2]    Intake/Output Summary (Last 24 hours) at 03/05/17 1409  Last data filed at 03/05/17 1400   Gross per 24 hour   Intake              730 ml   Output             1020 ml   Net             -290 ml     Wt Readings from Last 5 Encounters:   03/04/17 84.8 kg (187 lb)   07/15/16 84.3 kg (185 lb 13.6 oz)   03/05/15 72.1 kg (159 lb)   09/02/14 98 kg (216 lb 1.6 oz)   06/30/14 80.3 kg (177 lb)         Labs  Reviewed in epic    Assessment:  We are following this 89 yo female who, admitted on 3/3/17 with colonic distention, for decompensated heart failure with reduced EF of 15-20%, a new finding this admission from her prior echocardiogram in 2005.  Medical history is notable for atrial fibrillation, sinus node dysfunction and AV block s/p PPM 1999 with generator exchange in 2007, epidural hematoma in 7/2016 in the setting of supratherapeutic INR, history of pulmonary embolism, systemic and pulmonary hypertension, and obstructive sleep " apnea.     1. Heart failure with reduced EF, 15-20%, of as yet undetermined etiology. Evaluation for coronary artery disease is indicated but can be deferred to after the resolution of her acute illness.[PP1.1] Given her age one needs to weigh risks and benefits of aggressive evaluation for cause of her CHF.[PP1.2] Echo does not show the typical apical ballooning described in stress cardiomyopathy. She was taking furosemide 40 mg daily prior to admission.  2. Stably, mildly elevated troponin without chest pain or convincing changes of ischemia on EKG, likely due to decompensated heart failure or stress cardiomyopathy. CT did not show propagation of her PE burden.  3. Atrial fibrillation, rate controlled with metoprolol, on ASA but not anticoagulated despite her CHADSVasc score of at least 5 (chf, htn, age, female) since an epidural hemorrhage 7 months ago.  4. History of chronic pulmonary embolism, seen again on CT this admission.  5. Pulmonary hypertension and RV dysfunction on prior echocardiography, not previously evaluated with right heart catheterization.  6. Colonic distention, being evaluated by internal medicine and surgery.     Recommendations:[PP1.1]    She appears euvolemic on exam today. Transition to PO diuretic like Lasix 40mg once daily. Can Aim for euvolemia[PP1.2]    Regarding neurohormonal therapy for heart failure:    Continue metoprolol, which will need to eventually be switched to the oral succinate (XL) formulation.     Continue lisinopril 5 mg BID (increased yesterday)[PP1.1]    Follow up in [facility] clinic within one week of discharge and in Cardiology clinic in one month[PP1.2]    Staffed with Dr. Henderson[PP1.1]. We will sign off[PP1.2]    Watson Fuentes  General cards fellow, PGY4  Pager [phone][PP1.1]    ATTENDING ATTESTATION:  Patient has been seen and evaluated by me on March 5, 2017. I have reviewed the medications, laboratory, imaging, and other relevant results.  I agree with the above note  which I have edited, as necessary.  I have discussed my assessment and plan with the house staff.    Rossy Henderson MD, MS  Staff Cardiologist, [facility]  Pager: [phone][SK1.1]         Revision History        User Key Date/Time User Provider Type Action    > SK1.1 3/5/2017  4:39 PM Rossy Henderson MD Physician Sign     PP1.2 3/5/2017  4:38 PM Watson Fuentes MD Fellow Sign     PP1.1 3/5/2017  2:09 PM Watson Fuentes MD Fellow             Progress Notes by Rossy Henderson MD at 3/4/2017  2:33 PM     Author:  Rossy Henderson MD Service:  Cardiology Author Type:  Physician    Filed:  3/5/2017  4:34 PM Date of Service:  3/4/2017  2:33 PM Note Created:  3/4/2017  6:33 PM    Status:  Signed :  Rossy Henderson MD (Physician)         Cardiology follow up note    Assessment:  We are following this 89 yo female who, admitted on 3/3/17 with colonic distention, for decompensated heart failure with reduced EF of 15-20%, a new finding this admission from her prior echocardiogram in 2005.  Medical history is notable for atrial fibrillation, sinus node dysfunction and AV block s/p PPM 1999 with generator exchange in 2007, epidural hematoma in 7/2016 in the setting of supratherapeutic INR, history of pulmonary embolism, systemic and pulmonary hypertension, and obstructive sleep apnea.    1. Heart failure with reduced EF, 15-20%, of as yet undetermined etiology.  She remains hypervolemic on examination today.  Evaluation for coronary artery disease is indicated but can be deferred to after the resolution of her acute illness.  Echo does not show the typical apical ballooning described in stress cardiomyopathy.  She was taking furosemide 40 mg daily prior to admission.  2. Stably, mildly elevated troponin without chest pain or convincing changes of ischemia on EKG, likely due to decompensated heart failure or stress cardiomyopathy.  CT did not show propagation of her PE burden.  3. Atrial  "fibrillation, rate controlled with metoprolol, on ASA but not anticoagulated despite her CHADSVasc score of at least 5 (chf, htn, age, female) since an epidural hemorrhage 7 months ago.  4. History of chronic pulmonary embolism, seen again on CT this admission.  5. Pulmonary hypertension and RV dysfunction on prior echocardiography, not previously evaluated with right heart catheterization.  6. Colonic distention, being evaluated by internal medicine and surgery.    Recommendations:    Continue Furosemide 40 mg IV daily with the goal of achieving a net diuresis of 1.5-2 L today.    Regarding neurohormonal therapy for heart failure:    Continue metoprolol, which will need to eventually be switched to the oral succinate (XL) formulation.    Increase lisinopril to 5 mg BID.    I discussed the patient with Dr. Rossy Henderson.    Charbel Osman MD  Cardiovascular disease fellow  __________________________________________      Interval history:  She is more alert & comfortable today.  Abdominal pain is improved.  She is not dyspneic and denies orthopnea.  Lower extremity edema has gradually worsened over at least the past few weeks, per her son.    Examination:[NS1.1]  /66 (BP Location: Left arm)  Pulse 98  Temp 98  F (36.7  C) (Oral)  Resp 20  Ht 1.651 m (5' 5\")  Wt 85.3 kg (188 lb)  SpO2 96%  Breastfeeding? No  BMI 31.28 kg/m2[NS1.2]  Alert & oriented fully.  JVP is elevated.  Irregularly irregular rhythm.  Normal S1 and S2.  Warm extremities with stable lymphedema in both lower legs.  Not jaundiced.    Labs, imaging & procedures were reviewed.[NS1.1]  Recent Labs   Lab Test  03/04/17   0606   03/03/17   1103  07/15/16   1147   07/14/16   0600  07/14/16   0017  03/29/15   0809   CR  0.59   --   0.51*  0.53   --   0.49*  0.54  0.48*[NS1.2]   K[NS1.1]  3.8   < >  5.0  4.7   --   4.4  4.8  4.6   BUN  20   --   16  16   --   15  16  18   NA  140   --   140  134   < >  134  132*  137   HGB  13.5   --   14.7  11.1*   --   " 11.4*  11.9  13.1   PLT  209   --   263  207   --   284  311  211   WBC  8.9   --   11.9*  9.3   --   10.9  11.4*  14.2*    < > = values in this interval not displayed.[NS1.2]     ATTENDING ATTESTATION:  Patient has been seen and evaluated by me on March 4, 2017. I agree with the discharge summary note above. I have reviewed pertinent labs and studies. More than 30 minutes was spent organizing this patient's hospital discharge.     Rossy Henderson MD, MS  Staff Cardiologist, [facility]   Pager: [phone][SK1.1]       Revision History        User Key Date/Time User Provider Type Action    > SK1.1 3/5/2017  4:34 PM Rossy Henderson MD Physician Sign     [N/A] 3/4/2017  6:46 PM Charbel Osman MD Resident Sign     NS1.2 3/4/2017  6:45 PM Charbel Osman MD Resident Sign     NS1.1 3/4/2017  6:33 PM Charbel Osman MD Resident             Progress Notes by Carin Del Angel MD at 3/5/2017 11:32 AM     Author:  Carin Del Angel MD Service:  Hospitalist Author Type:  Physician    Filed:  3/5/2017 11:41 AM Date of Service:  3/5/2017 11:32 AM Note Created:  3/5/2017 11:32 AM    Status:  Signed :  aCrin Del Angel MD (Physician)         [facility], [place]   Internal Medicine Daily Note          Assessment and Plan:    Ankita Pham is a 89 year old female with a past medical history of IBS, chronic lymphedema, chronic pain, atrial fibrillation s/p PPM, PUD, PE, epidural hematoma 07/2016 who is admitted to medicine for abdominal pain, AMS, CT consistent with colonic distention.     #Metabolic Encephalopathy( Resolved):   Initially manifested by lethargy and disorientation  Thought to be due to the combination of pain medications, UTI as well as acute pain from sub acute bowel obstruction   This has completely resolved now   Was on ceftriaxone for UTI. Given the ability to tolerate orals, will change to oral Vantin to complete a total of  5 days of Abx        #Colonic distention with  pseudo-obstruction:   CT abd/pelvis w/ circumferential thickening of the rectum, severe air distended lops of colon w/ large amt of stool in rectum concerning for early distal colonic obstruction or colonic ileus (Cleveland's syndrome), no evidence of ischemia, non dilated small bowel loops. Noted atherosclerotic disease w/ severe stenosis of celiac axis, superior mesenteric arter origins, moderate stenosis of the left common iliac artery. Patient w/ chronic constipation likely 2/2 narcotics. LA mildly elevated at 2.5 on presentation which improved w/ 1 L of IVF bolus. Abdominal exam w/ normoactive bowel sounds, soft, no peritoneal signs.   -GI and CRS consulted, appreciate recs  -Monitor lytes, replace per protocol  -Cdiff is negative   -Serial abdominal exams. Appears to be getting better evidenced by improvement in pain and presence of bowel movements    Tolerated sips very will without further nausea. Patinet appreciating flatus as well  Will Discontinue rectal tube and advance clear liquids as it appears that the obstruction has clinically resolved.       #Acute exacerbation of heart failure with reduced EF, new onset  #Elevated troponin  Echocardiogram w/ mild LV dilation, severe diffuse hypokinesis, EF of 15-20%, normal RV function, dilation of IVC w/ abnormal respiratory variation, no pericardial effusion, moderate tricuspid insufficiency, mild aortic valve sclerosis, mild mitral annular [place]ification. [place] of [address]. CT chest w/ elevated RH dysfunction, dilated pulmonary artery, moderate pulmonary edema and new small bilateral pleural effusions R>L. Trop 0.757 in ED, up to 0.962 on admission. Cardiology evaluated, felt likely 2/2 decompensated HF vs. Ongoing ischemia vs. Stress cardiomyopathy.   -Cardiology consulted, appreciate recs  -Diuresed w/ IV lasix 40 mg x 2  ( Net negative by 1.5 lts in the last 24 hrs). Holding off on IV lasix today ( 3/5)  -Tele,  continuous pulse ox  -Strict Is/Os  -Daily weights ( Trending down)  -Continue metoprolol and Lisinopril increased to 5 mg BID      #SA node dysfunction, Atrial fibrillation w/ RVR:   s/p PPM placement. PTA on rate control w/ metoprolol. On aspirin for anti platelet. Chads vasc score of 5 (age, CHF, HTN, sex). Warfarin DCed in 07/2016 after an epidural hematoma. HR in 90s-1 teens.   -Cardiology consulted, appreciate recs  -Contniue rate control with metoprolol for now ( Replaced with IV metoprolol)   -S/P 2 dose of 40 mg IV lasix.   -Tele  -Lymphedema consult      #Abnormal UA:   UA w/ 5 ketones, elevated specific gravity, 100 protein, positive nitrite and LE, microscopy w/ 5 WBC, few bacteria. Past cultures in 07/2016, 08/2014, 12/2009 w/ Ecoli S to rocephin. Afebrile, WBC count mildly elevated but procal negative, WBC could be reactive to acute process. S/P 1 g of rocephin and placement of larson in ED.   Change to oral vantin this morning as patient tolerating orals and E coli sensitive to cephalosporins        #Chronic PE:   Not on AC 2/2 recent epidural hematoma. No new emboli noted.  -Continue conservative managment      #Hypothyroidism:   On synthroid PTA. TSH WNL.   -Continue current dose     #Chronic pain:   On gabapentin 200 qhs, oxycodone 5 mg TID scheduled w/ 5-10 mg q4 PRN for moderate to severe pain PTA. Bowel regimen w/ miralax BID, senna-colace 2 tabs BID, PRN lactulose, dulcolax suppositories.  -Hold narcotics w/ AMS and colonic distention. Patient has not been asking for pain medications   Will begin to add home bowel regimen slowly           Consulting teams: Gastroenterology/ Colorectal surgery   Code status: DNR/ DNI  DVT Prophylaxis:PCD's   Gastric prophylaxis:PPI  Diet:  Initiate clear liquid diet   Disposition: To be determined       Patient seen and examined with RN         Interval History:   Progress notes in the last 24 hrs by MDT team has been reviewed. This is the first time I am meeting  "with patient.   Ankita feels much better today. In that she feels that her bowel distension has gone down significantly  Denies any fevers or chills   Nausea is much better  Denies any abdominal pain  Wants to be able to drink fluids           Review of Systems:   A comprehensive review of systems was performed and found to be negative except: Those that are outlined in interval history              Medications:   I have reviewed this patient's current medications which are outlined in the \"current medication\" section of EPIC             Physical Exam:   Vitals were reviewed[TK1.1]  Temp: 97.4  F (36.3  C) Temp src: Oral BP: (!) 119/106   Heart Rate: 102 Resp: 18 SpO2: 97 % O2 Device: Nasal cannula Oxygen Delivery: 2 LPM[TK1.2]  Constitutional:   awake, alert, cooperative, no apparent distress, and appears stated age     Lungs:   No increased work of breathing, good air exchange, clear to auscultation bilaterally, no crackles or wheezing     CVS:   Normal heart sounds. Bilateral edema noted.          Abdomen:   Soft. No significant distension. Normal bowel sounds. No tenderness      Musculoskeletal:   Bilateral lymphedema noted      Neurologic:   Awake, alert, oriented to name, place and time.  Cranial nerves II-XII are grossly intact.            Data:     Most recent labs have been evaluated and relevant labs are outlined below :    BMP[TK1.1]    Recent Labs  Lab 03/05/17  0733 03/04/17  1750 03/04/17  0606 03/03/17  1248 03/03/17  1103    144 140  --  140   POTASSIUM 3.9 3.4 3.8 4.1 5.0   CHLORIDE 104 102 102  --  103   OMERO 8.2* 8.4* 8.7  --  8.4*   CO2 31 30 31  --  24   BUN 25 21 20  --  16   CR 0.62 0.69 0.59  --  0.51*   GLC 96 87 124*  --  146*[TK1.2]     CBC[TK1.1]    Recent Labs  Lab 03/04/17  0606 03/03/17  1103   WBC 8.9 11.9*   RBC 4.33 4.66   HGB 13.5 14.7   HCT 42.6 45.5   MCV 98 98   MCH 31.2 31.5   MCHC 31.7 32.3   RDW 14.4 14.6    263[TK1.2]     INR[TK1.1]    Recent Labs  Lab " "03/04/17  0606 03/03/17  1103   INR 1.07 1.11[TK1.2]     LFTs[TK1.1]    Recent Labs  Lab 03/04/17  1750 03/04/17  0606 03/03/17  1248 03/03/17  1103   ALKPHOS 76 75  --  88   AST 14 14 13 Unsatisfactory specimen - hemolyzedNOTIFIED RN JONATHON KENYON @1220 03.03.17 UUER. BY EK   ALT 8 11  --  7   BILITOTAL 0.4 0.8  --  0.6   PROTTOTAL 6.3* 6.3*  --  6.8   ALBUMIN 3.0* 2.9*  --  3.1*[TK1.2]      PANC[TK1.1]    Recent Labs  Lab 03/03/17  1103   LIPASE 55*[TK1.2]               Dr NBA Rosas MD  Hospitalist ( Internal medicine)  Pager: [phone][TK1.1]           Revision History        User Key Date/Time User Provider Type Action    > TK1.2 3/5/2017 11:41 AM Carin Del Angel MD Physician Sign     TK1.1 3/5/2017 11:32 AM Carin Del Angel MD Physician                   Procedure Notes     No notes of this type exist for this encounter.      Progress Notes - Therapies (Notes from 03/03/17 through 03/06/17)     No notes of this type exist for this encounter.                                          INTERAGENCY TRANSFER FORM - LAB / IMAGING / EKG / EMG RESULTS   3/3/2017                       [address] [facility] BANK: [phone]            Unresulted Labs (24h ago through future)    Start       Ordered    Unscheduled  Potassium  (Potassium Replacement - \"High\" - Replacement for all levels less than 4.1 mmol/L - UU,UR,UA,RH,SH,PH,WY )  CONDITIONAL (SPECIFY),   Routine     Comments:  Obtain Potassium Level for these conditions:  *IF no potassium result within 24 hrs before initiation of order set, draw potassium level with next lab collect.    *2 HOURS AFTER last IV potassium replacement dose and 4 hours after an oral replacement dose when potassium replacement given for level less than 3.4.  *Next morning after potassium dose.     Repeat Potassium Replacement if necessary.    03/03/17 2110    Unscheduled  Magnesium  (Magnesium Replacement - Adult - \"High\" - Replacement for all levels less than or " equal to 2 mg/dL)  CONDITIONAL (SPECIFY),   Routine     Comments:  Obtain Magnesium Level for these conditions:  *IF no magnesium result within 24 hrs before initiation of order set, draw magnesium level with next lab collect.    *2 HOURS AFTER last magnesium replacement dose when magnesium replacement given for level less than 1.6  *Next morning after magnesium dose.     Repeat Magnesium Replacement if necessary.    03/03/17 2110         Lab Results - 3 Days      Potassium [[id number]]  Resulted: 03/06/17 1057, Result status: Final result    Ordering provider: Carin Del Angel MD  03/06/17 0951 Resulting lab: [facility]    Specimen Information    Type Source Collected On   Blood  03/06/17 1018          Components       Value Reference Range Flag Lab   Potassium 3.7 3.4 - 5.3 mmol/L  51            HIV Antigen Antibody Combo [[id number]]  Resulted: 03/06/17 1014, Result status: Final result    Ordering provider: Carolyn Larry PA-C  03/04/17 0500 Resulting lab: [facility]    Specimen Information    Type Source Collected On   Blood  03/04/17 0535          Components       Value Reference Range Flag Lab   HIV Antigen Antibody Combo -- NR  51   Result:         Nonreactive   HIV-1 p24 Ag & HIV-1/HIV-2 Ab Not Detected              Blood culture [[id number]]  Resulted: 03/06/17 0350, Result status: Preliminary result    Ordering provider: Mannie Lovell MD  03/03/17 1109 Resulting lab: [facility]    Specimen Information    Type Source Collected On   Blood Arm, Left 03/03/17 1103          Components       Value Reference Range Flag Lab   Specimen Description Blood Right Arm   75   Culture Micro No growth after 3 days   225   Micro Report Status Pending   225            Blood culture [[id number]]  Resulted: 03/06/17 0350, Result status: Preliminary result    Ordering provider: Mannie Lovell MD  03/03/17 1109 Resulting  lab: [facility]    Specimen Information    Type Source Collected On   Blood Arm, Right 03/03/17 1103          Components       Value Reference Range Flag Lab   Specimen Description Blood Left Arm   75   Culture Micro No growth after 3 days   225   Micro Report Status Pending   225            Basic metabolic panel [[id number]] (Abnormal)  Resulted: 03/05/17 0847, Result status: Final result    Ordering provider: Carin Del Angel MD  03/04/17 2330 Resulting lab: [facility]    Specimen Information    Type Source Collected On   Blood  03/05/17 0733          Components       Value Reference Range Flag Lab   Sodium 144 133 - 144 mmol/L  51   Potassium 3.9 3.4 - 5.3 mmol/L  51   Chloride 104 94 - 109 mmol/L  51   Carbon Dioxide 31 20 - 32 mmol/L  51   Anion Gap 9 3 - 14 mmol/L  51   Glucose 96 70 - 99 mg/dL  51   Urea Nitrogen 25 7 - 30 mg/dL  51   Creatinine 0.62 0.52 - 1.04 mg/dL  51   GFR Estimate 89 >60 mL/min/1.7m2  51   Comment:  Non  GFR Calc   GFR Estimate If Black -- >60 mL/min/1.7m2  51   Result:         >90   GFR Calc     Calcium 8.2 8.5 - 10.1 mg/dL L 51   Result:              Urine Culture Aerobic Bacterial [[id number]] (Abnormal)  Resulted: 03/05/17 0439, Result status: Final result    Ordering provider: Mannie Lovell MD  03/03/17 1328 Resulting lab: [facility]    Specimen Information    Type Source Collected On     03/03/17 1328          Components       Value Reference Range Flag Lab   Specimen Description Catheterized Urine   51   Special Requests Specimen received in preservative   75   Culture Micro 50,000 to 100,000 colonies/mL Escherichia coli  A 225   Micro Report Status FINAL 03/05/2017   225   Organism: 50,000 to 100,000 colonies/mL Escherichia coli   225            Glucose by meter [[id number]] (Abnormal)  Resulted: 03/04/17 2350, Result status: Final result     Ordering provider: Guru Lewis MD  03/04/17 2349 Resulting lab: POINT OF CARE TEST, GLUCOSE    Specimen Information    Type Source Collected On     03/04/17 2349          Components       Value Reference Range Flag Lab   Glucose 113 70 - 99 mg/dL H 170            Comprehensive metabolic panel [[id number]] (Abnormal)  Resulted: 03/04/17 2031, Result status: Final result    Ordering provider: Carin Del Angel MD  03/04/17 1455 Resulting lab: [facility]    Specimen Information    Type Source Collected On   Blood  03/04/17 1750          Components       Value Reference Range Flag Lab   Sodium 144 133 - 144 mmol/L  51   Potassium 3.4 3.4 - 5.3 mmol/L  51   Chloride 102 94 - 109 mmol/L  51   Carbon Dioxide 30 20 - 32 mmol/L  51   Anion Gap 12 3 - 14 mmol/L  51   Glucose 87 70 - 99 mg/dL  51   Urea Nitrogen 21 7 - 30 mg/dL  51   Creatinine 0.69 0.52 - 1.04 mg/dL  51   GFR Estimate 79 >60 mL/min/1.7m2  51   Comment:  Non  GFR Calc   GFR Estimate If Black -- >60 mL/min/1.7m2  51   Result:         >90   GFR Calc     Calcium 8.4 8.5 - 10.1 mg/dL L 51   Result:     Bilirubin Total 0.4 0.2 - 1.3 mg/dL  51   Albumin 3.0 3.4 - 5.0 g/dL L 51   Protein Total 6.3 6.8 - 8.8 g/dL L 51   Alkaline Phosphatase 76 40 - 150 U/L  51   ALT 8 0 - 50 U/L  51   AST 14 0 - 45 U/L  51            Magnesium [[id number]]  Resulted: 03/04/17 0707, Result status: Final result    Ordering provider: Carolyn Larry PA-C  03/04/17 0606 Resulting lab: [facility]    Specimen Information    Type Source Collected On     03/04/17 0606          Components       Value Reference Range Flag Lab   Magnesium 1.8 1.6 - 2.3 mg/dL  51            INR [[id number]]  Resulted: 03/04/17 0655, Result status: Final result    Ordering provider: Carolyn Larry PA-C  03/03/17 2330 Resulting lab: [facility]     Specimen Information    Type Source Collected On   Blood  03/04/17 0606          Components       Value Reference Range Flag Lab   INR 1.07 0.86 - 1.14  51            Comprehensive metabolic panel [[id number]] (Abnormal)  Resulted: 03/04/17 0654, Result status: Final result    Ordering provider: Carolyn Larry PA-C  03/03/17 2330 Resulting lab: [facility]    Specimen Information    Type Source Collected On   Blood  03/04/17 0606          Components       Value Reference Range Flag Lab   Sodium 140 133 - 144 mmol/L  51   Potassium 3.8 3.4 - 5.3 mmol/L  51   Chloride 102 94 - 109 mmol/L  51   Carbon Dioxide 31 20 - 32 mmol/L  51   Anion Gap 7 3 - 14 mmol/L  51   Glucose 124 70 - 99 mg/dL H 51   Urea Nitrogen 20 7 - 30 mg/dL  51   Creatinine 0.59 0.52 - 1.04 mg/dL  51   GFR Estimate -- >60 mL/min/1.7m2  51   Result:         >90  Non  GFR Calc     GFR Estimate If Black -- >60 mL/min/1.7m2  51   Result:         >90   GFR Calc     Calcium 8.7 8.5 - 10.1 mg/dL  51   Result:     Bilirubin Total 0.8 0.2 - 1.3 mg/dL  51   Albumin 2.9 3.4 - 5.0 g/dL L 51   Protein Total 6.3 6.8 - 8.8 g/dL L 51   Alkaline Phosphatase 75 40 - 150 U/L  51   ALT 11 0 - 50 U/L  51   AST 14 0 - 45 U/L  51            CBC with platelets [[id number]]  Resulted: 03/04/17 0626, Result status: Final result    Ordering provider: Carolyn Larry PA-C  03/03/17 2330 Resulting lab: [facility]    Specimen Information    Type Source Collected On   Blood  03/04/17 0606          Components       Value Reference Range Flag Lab   WBC 8.9 4.0 - 11.0 10e9/L  51   RBC Count 4.33 3.8 - 5.2 10e12/L  51   Hemoglobin 13.5 11.7 - 15.7 g/dL  51   Hematocrit 42.6 35.0 - 47.0 %  51   MCV 98 78 - 100 fl  51   MCH 31.2 26.5 - 33.0 pg  51   MCHC 31.7 31.5 - 36.5 g/dL  51   RDW 14.4 10.0 - 15.0 %  51   Platelet Count 209 150 - 450 10e9/L  51            Lactic acid whole blood  [[id number]]  Resulted: 03/04/17 0600, Result status: Final result    Ordering provider: Liseth Sharma MD  03/04/17 0456 Resulting lab: [facility]    Specimen Information    Type Source Collected On   Blood  03/04/17 0535          Components       Value Reference Range Flag Lab   Lactic Acid 1.1 0.7 - 2.1 mmol/L  51            Troponin I [[id number]] (Abnormal)  Resulted: 03/03/17 2314, Result status: Final result    Ordering provider: Carolyn Larry PA-C  03/03/17 2151 Resulting lab: [facility]    Specimen Information    Type Source Collected On   Blood  03/03/17 2231          Components       Value Reference Range Flag Lab   Troponin I ES 0.940 0.000 - 0.045 ug/L  51   Comment:         The 99th percentile for upper reference range is 0.045 ug/L.  Troponin values   in   the range of 0.045 - 0.120 ug/L may be associated with risks of adverse   clinical events.   Consistent with previous critical result              Troponin I [[id number]] (Abnormal)  Resulted: 03/03/17 2227, Result status: Final result    Ordering provider: Carolyn Larry PA-C  03/03/17 1930 Resulting lab: [facility]    Specimen Information    Type Source Collected On   Blood  03/03/17 2124          Components       Value Reference Range Flag Lab   Troponin I ES 0.929 0.000 - 0.045 ug/L  51   Comment:         The 99th percentile for upper reference range is 0.045 ug/L.  Troponin values   in   the range of 0.045 - 0.120 ug/L may be associated with risks of adverse   clinical events.   Consistent with previous critical result              Clostridium difficile toxin B PCR [[id number]]  Resulted: 03/03/17 2011, Result status: Final result    Ordering provider: Mannie Lovell MD  03/03/17 1605 Resulting lab: [facility]    Specimen Information    Type Source Collected On   Stool  03/03/17 1658          Components        Value Reference Range Flag Lab   Specimen Description Feces   51   C Diff Toxin B PCR -- NEG  75   Result:         Negative  Negative: Clostridium difficile target DNA sequences NOT detected, presumed   negative for Clostridium difficile toxin B or the number of bacteria present   may be below the limit of detection for the test.   FDA approved assay performed using [employer] GeneXpert real-time PCR.   A negative result does not exclude actual disease due to Clostridium difficile   and may be due to improper collection, handling and storage of the specimen or   the number of organisms in the specimen is below the detection limit of the   assay.              TSH [[id number]]  Resulted: 03/03/17 1939, Result status: Final result    Ordering provider: Mannie Lovell MD  03/03/17 1103 Resulting lab: [facility]    Specimen Information    Type Source Collected On     03/03/17 1103          Components       Value Reference Range Flag Lab   TSH 0.83 0.40 - 4.00 mU/L  51            Troponin I [[id number]] (Abnormal)  Resulted: 03/03/17 1621, Result status: Final result    Ordering provider: Mannie Lovell MD  03/03/17 1511 Resulting lab: [facility]    Specimen Information    Type Source Collected On   Blood  03/03/17 1535          Components       Value Reference Range Flag Lab   Troponin I ES 0.962 0.000 - 0.045 ug/L  51   Comment:         The 99th percentile for upper reference range is 0.045 ug/L.  Troponin values   in   the range of 0.045 - 0.120 ug/L may be associated with risks of adverse   clinical events.   Consistent with previous critical result              Procalcitonin [[id number]]  Resulted: 03/03/17 1449, Result status: Final result    Ordering provider: Mannie Lovell MD  03/03/17 1109 Resulting lab: [facility]    Specimen Information    Type Source Collected On   Blood  03/03/17 1103          Components        Value Reference Range Flag Lab   Procalcitonin -- ng/ml  51   Result:         <0.05  <0.05 ng/ml  Normal  Recommendation: Very low risk of bacterial infection.   Discourage antibiotics unless strong clinical suspicion for serious infection.              UA with Microscopic reflex to Culture [[id number]] (Abnormal)  Resulted: 03/03/17 1414, Result status: Final result    Ordering provider: Mannie Lovell MD  03/03/17 1109 Resulting lab: [facility]    Specimen Information    Type Source Collected On   Urine Urine catheter 03/03/17 1328          Components       Value Reference Range Flag Lab   Color Urine Yellow   51   Appearance Urine Clear   51   Glucose Urine Negative NEG mg/dL  51   Bilirubin Urine Negative NEG  51   Ketones Urine 5 NEG mg/dL A 51   Specific Gravity Urine >1.050 1.003 - 1.035 H 51   Blood Urine Negative NEG  51   pH Urine 5.5 5.0 - 7.0 pH  51   Protein Albumin Urine 100 NEG mg/dL A 51   Urobilinogen mg/dL Normal 0.0 - 2.0 mg/dL  51   Nitrite Urine Positive NEG A 51   Leukocyte Esterase Urine Trace NEG A 51   Source Catheterized Urine   51   WBC Urine 5 0 - 2 /HPF H 51   RBC Urine 2 0 - 2 /HPF  51   Bacteria Urine Few NEG /HPF A 51   Mucous Urine Present NEG /LPF A 51            AST [[id number]]  Resulted: 03/03/17 1317, Result status: Final result    Ordering provider: Mannie Lovell MD  03/03/17 1218 Resulting lab: [facility]    Specimen Information    Type Source Collected On   Blood  03/03/17 1248          Components       Value Reference Range Flag Lab   AST 13 0 - 45 U/L  51            Potassium [[id number]]  Resulted: 03/03/17 1317, Result status: Final result    Ordering provider: Mannie Lovell MD  03/03/17 1218 Resulting lab: [facility]    Specimen Information    Type Source Collected On   Blood  03/03/17 1248          Components       Value Reference Range Flag Lab   Potassium 4.1 3.4 - 5.3  mmol/L  51            Nt probnp inpatient [[id number]] (Abnormal)  Resulted: 03/03/17 1317, Result status: Final result    Ordering provider: Mannie Lovell MD  03/03/17 1248 Resulting lab: [facility]    Specimen Information    Type Source Collected On     03/03/17 1248          Components       Value Reference Range Flag Lab   N-Terminal Pro BNP Inpatient 66530 0 - 1800 pg/mL H 51   Comment:         Reference range shown and results flagged as abnormal are suggested inpatient   cut points for confirming diagnosis if CHF in an acute setting. Establishing   a   baseline value for each individual patient is useful for follow-up. An   inpatient or emergency department NT-proPBNP <300 pg/mL effectively rules out   acute CHF, with 99% negative predictive value.  The outpatient non-acute reference range for ruling out CHF is:   0-125 pg/mL (age 18 to less than 75)   0-450 pg/mL (age 75 yrs and older)              Ammonia (on ice) [[id number]]  Resulted: 03/03/17 1312, Result status: Final result    Ordering provider: Mannie Lovell MD  03/03/17 1218 Resulting lab: [facility]    Specimen Information    Type Source Collected On   Blood  03/03/17 1243          Components       Value Reference Range Flag Lab   Ammonia 19 10 - 50 umol/L  51            Ammonia (on ice) [[id number]]  Resulted: 03/03/17 1220, Result status: Final result    Ordering provider: Mannie Lovell MD  03/03/17 1109 Resulting lab: [facility]    Specimen Information    Type Source Collected On   Blood  03/03/17 1103          Components       Value Reference Range Flag Lab   Ammonia -- 10 - 50 umol/L  51   Result:         Unsatisfactory specimen - hemolyzed  NOTIFIED CARLOS KENYON @1220 03.03.17 UUER. BY EK              Troponin I [[id number]] (Abnormal)  Resulted: 03/03/17 1219, Result status: Final result    Ordering provider: Mannie Lovell MD  03/03/17 1105  Resulting lab: [facility]    Specimen Information    Type Source Collected On   Blood  03/03/17 1103          Components       Value Reference Range Flag Lab   Troponin I ES 0.757 0.000 - 0.045 ug/L HH 51   Comment:         The 99th percentile for upper reference range is 0.045 ug/L.  Troponin values   in   the range of 0.045 - 0.120 ug/L may be associated with risks of adverse   clinical events.   Critical Value called to and read back by   CARLOS KENYON @1220 03.03.17 UUER. BY EK              Comprehensive metabolic panel [[id number]] (Abnormal)  Resulted: 03/03/17 1219, Result status: Final result    Ordering provider: Mannie Lovell MD  03/03/17 1109 Resulting lab: [facility]    Specimen Information    Type Source Collected On   Blood  03/03/17 1103          Components       Value Reference Range Flag Lab   Sodium 140 133 - 144 mmol/L  51   Potassium 5.0 3.4 - 5.3 mmol/L  51   Comment:  Specimen slightly hemolyzed, potassium may be falsely elevated   Chloride 103 94 - 109 mmol/L  51   Carbon Dioxide 24 20 - 32 mmol/L  51   Anion Gap 12 3 - 14 mmol/L  51   Glucose 146 70 - 99 mg/dL H 51   Urea Nitrogen 16 7 - 30 mg/dL  51   Creatinine 0.51 0.52 - 1.04 mg/dL L 51   GFR Estimate -- >60 mL/min/1.7m2  51   Result:         >90  Non  GFR Calc     GFR Estimate If Black -- >60 mL/min/1.7m2  51   Result:         >90   GFR Calc     Calcium 8.4 8.5 - 10.1 mg/dL L 51   Result:     Bilirubin Total 0.6 0.2 - 1.3 mg/dL  51   Albumin 3.1 3.4 - 5.0 g/dL L 51   Protein Total 6.8 6.8 - 8.8 g/dL  51   Alkaline Phosphatase 88 40 - 150 U/L  51   ALT 7 0 - 50 U/L  51   AST -- 0 - 45 U/L  51   Result:         Unsatisfactory specimen - hemolyzed  NOTIFIED CARLOS KENYON @1220 03.03.17 UUER. BY EK              Lipase [[id number]] (Abnormal)  Resulted: 03/03/17 1219, Result status: Final result    Ordering provider: Mannie Lovell MD   03/03/17 1109 Resulting lab: [facility]    Specimen Information    Type Source Collected On   Blood  03/03/17 1103          Components       Value Reference Range Flag Lab   Lipase 55 73 - 393 U/L L 51            Lactic acid [[id number]] (Abnormal)  Resulted: 03/03/17 1153, Result status: Final result    Ordering provider: Mannie Lovell MD  03/03/17 1109 Resulting lab: [facility]    Specimen Information    Type Source Collected On   Blood  03/03/17 1103          Components       Value Reference Range Flag Lab   Lactic Acid 2.5 0.7 - 2.1 mmol/L H 51            INR [[id number]]  Resulted: 03/03/17 1145, Result status: Final result    Ordering provider: Mannie Lovell MD  03/03/17 1109 Resulting lab: [facility]    Specimen Information    Type Source Collected On   Blood  03/03/17 1103          Components       Value Reference Range Flag Lab   INR 1.11 0.86 - 1.14  51            CBC with platelets differential [[id number]] (Abnormal)  Resulted: 03/03/17 1138, Result status: Final result    Ordering provider: Mannie Lovell MD  03/03/17 1109 Resulting lab: [facility]    Specimen Information    Type Source Collected On   Blood  03/03/17 1103          Components       Value Reference Range Flag Lab   WBC 11.9 4.0 - 11.0 10e9/L H 51   RBC Count 4.66 3.8 - 5.2 10e12/L  51   Hemoglobin 14.7 11.7 - 15.7 g/dL  51   Hematocrit 45.5 35.0 - 47.0 %  51   MCV 98 78 - 100 fl  51   MCH 31.5 26.5 - 33.0 pg  51   MCHC 32.3 31.5 - 36.5 g/dL  51   RDW 14.6 10.0 - 15.0 %  51   Platelet Count 263 150 - 450 10e9/L  51   Diff Method Automated Method   51   % Neutrophils 88.5 %  51   % Lymphocytes 8.5 %  51   % Monocytes 2.5 %  51   % Eosinophils 0.0 %  51   % Basophils 0.1 %  51   % Immature Granulocytes 0.4 %  51   Nucleated RBCs 0 0 /100  51   Absolute Neutrophil 10.6 1.6 - 8.3 10e9/L H 51   Absolute Lymphocytes 1.0  0.8 - 5.3 10e9/L  51   Absolute Monocytes 0.3 0.0 - 1.3 10e9/L  51   Absolute Eosinophils 0.0 0.0 - 0.7 10e9/L  51   Absolute Basophils 0.0 0.0 - 0.2 10e9/L  51   Abs Immature Granulocytes 0.1 0 - 0.4 10e9/L  51   Absolute Nucleated RBC 0.0   51            Creatinine POCT [[id number]] (Abnormal)  Resulted: 03/03/17 1111, Result status: Final result    Ordering provider: Mannie Lovell MD  03/03/17 1105 Resulting lab: POINT OF CARE TEST, HANDHELD METER    Specimen Information    Type Source Collected On     03/03/17 1105          Components       Value Reference Range Flag Lab   Creatinine 0.5 0.52 - 1.04 mg/dL L 171   GFR Estimate >90 >60 mL/min/1.7m2  171   GFR Estimate If Black >90 >60 mL/min/1.7m2  171            Lactic acid whole blood [[id number]]  Resulted: 03/03/17 1110, Result status: In process    Ordering provider: Mannie Lovell MD  03/03/17 1105 Resulting lab: [facility]    Specimen Information    Type Source Collected On     03/03/17 1105            Testing Performed By     Lab - Abbreviation Name Director Address Valid Date Range    45 - [id number] [facility] Unknown Unknown 01/28/02 0000 - Present    51 - Unknown [facility] [place] Unknown [address] [facility] [id number] 12/31/14 1010 - Present    75 - Unknown [facility] Unknown [address] [facility] [id number] 01/15/15 1019 - Present    170 - Unknown POINT OF CARE TEST, GLUCOSE Unknown Unknown 10/31/11 1114 - Present    171 - Unknown POINT OF CARE TEST, HANDHELD METER Unknown Unknown 10/31/11 1113 - Present    225 - Unknown [facility] Unknown [address] [facility] [id number] 12/19/14 0954 - Present               Imaging Results - 3 Days      XR Abdomen 1 View [[id number]]  Resulted: 03/04/17 1029, Result status: Final result    Ordering provider: Liseth Sharma MD  03/04/17 0448 Resulted by: Lalitha Agrawal MD Prychyna, Oksana, MD    Performed:  03/04/17 0548 - 03/04/17 0612 Resulting lab: RADIOLOGY RESULTS    Narrative:       EXAM: XR ABDOMEN 1 VW  3/4/2017 6:12 AM      HISTORY: worsening abdominal distension    COMPARISON: 3/3/2017    FINDINGS: Air filled nondistended bowel loops. Gas is seen in the  colon and rectum with a nonobstructive bowel gas pattern. No definite  pneumatosis or free air. New rectal tube projecting over the midline  pelvis.      Impression:       IMPRESSION: Bowel gas is present within a nonobstructive bowel gas  pattern.    I have personally reviewed the examination and initial interpretation  and I agree with the findings.    KRISTI OJEDA MD      Abd/pelvis CT,  IV  contrast only TRAUMA / AAA [[id number]] (Abnormal)  Resulted: 03/03/17 1409, Result status: Final result    Ordering provider: Mannie Lovell MD  03/03/17 1108 Resulted by: Manju Loza MD Babcock, Andrew, MD    Performed: 03/03/17 1127 - 03/03/17 1207 Resulting lab: RADIOLOGY RESULTS    Narrative:       EXAMINATION: CT ABDOMEN PELVIS W CONTRAST, 3/3/2017 12:56 PM    TECHNIQUE:  Helical CT images from the lung bases through the  symphysis pubis were obtained with IV contrast. Contrast dose:  iopamidol (ISOVUE-370) solution 123 mL    COMPARISON: CT of the abdomen and pelvis on 2/24/2015. Chest CT on  3/3/2017.    HISTORY: Abdominal pain and distention    FINDINGS:    Abdomen/pelvis:   There are numerous redundant loops of colon. Several of these are  severely distended measuring up to 8.7 cm in diameter in the mid  abdomen. There is large volume of stool in the rectum with air stool  level. Mild circumferential thickening of the distal rectum, more  pronounced posteriorly. No defined transition point is appreciated. No  wall thickening, pneumatosis, or portal venous gas. No proximal small  bowel dilatation. No pericolonic inflammation. The appendix is not  identified. There are no secondary signs of appendicitis. No  extraluminal air or free fluid.  Large sliding-type hiatal hernia  containing the majority of the stomach.     Evaluation of the upper abdomen is limited by motion artifact.  Examination is further limited by early contrast bolus with exam in  the late arterial phase. Marked common bile duct dilatation measuring  up to 2.3 cm in diameters not significantly changed since 2/24/2015.  Redemonstration of punctate pneumobilia adjacent to the ampulla of  Vater. There is mild irregular central intrahepatic biliary duct  dilatation, decreased from prior. The gallbladder is within normal  limits. No hepatic mass or abnormal enhancement. Poor opacification of  the portal vein, likely secondary to technique.     Simple cysts in the kidneys bilaterally, as well as subcentimeter  hypodensities that are too small to characterize but also likely  cysts. No hydronephrosis or hydroureter. Normal bilateral nephrograms.  The pancreas, spleen, and adrenal glands are unremarkable. No  suspicious adenopathy.    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 The bladder is minimally distended and  otherwise unremarkable. Irregular calcification in the uterine fundus  likely representing fibroids.    Bones/soft tissues: Diffuse mild fatty atrophy of the musculature.  Diastases of the rectus abdominis muscles. Marked convex right  scoliosis of the spine centered at L1. Diffuse severe osteopenia.  Moderate to severe multilevel degenerative changes of the spine.  Lucency with coarsened trabeculae in the L2 vertebral body likely  representing a benign hemangioma.    Lung bases:  Small right and trace left pleural effusions. Enlarged  right atrium with partially visualized pacemaker leads.      Impression:       IMPRESSION:   1. Severe air distended loops of colon with large amount of stool  in  the rectum may represent early distal colonic obstruction, or less  likely colonic ileus ([place]'s syndrome). No findings suggestive of  ischemia.  Non dilated small bowel loops.   a. Circumferential thickening of the rectum, could represent colitis.  Recommend clinical correlation and possible additional evaluation of  rectum.   2. Unchanged severe common bile duct dilatation with mildly improved  intrahepatic biliary duct dilatation. Possible trace amount of  pneumobilia near the ampulla of Vater. Cannot exclude an obstructing  biliary or pancreatic head mass. This could be further evaluated with  MRCP or ERCP.   3. 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  4. Large sliding-type hiatal hernia.  5. Please see CT pulmonary angiogram from the same day for details  regarding the chest.     [Urgent Result: Rectal stool with air distended colon]    Finding was identified on 3/3/2017 12:56 PM.     Dr. Lovell was contacted by Dr. Cee at 3/3/2017 1:20 PM and  verbalized understanding of the urgent finding.     I have personally reviewed the examination and initial interpretation  and I agree with the findings.    RAH BLOOM MD    Components       Value Reference Range Flag Lab   Radiologist flags Rectal stool with air distended colon  Urgent             Chest CT, IV contrast only - PE protocol [[id number]]  Resulted: 03/03/17 1233, Result status: Final result    Ordering provider: Mannie Lovell MD  03/03/17 1108 Resulted by: Alysia Ruiz MD Page, Alexander, MD    Performed: 03/03/17 1126 - 03/03/17 1201 Resulting lab: RADIOLOGY RESULTS    Narrative:       Examination:  CT CHEST PULMONARY EMBOLISM W CONTRAST 3/3/2017 12:01 PM      Comparison: 3/29/2015    History: Hypoxia    TECHNIQUE: Volumetric helical acquisition of CT images of the chest  from the lung apices to the kidneys were  acquired in arterial phase  after the administration of IV contrast. Contrast dose: 123 cc  [id number]    FINDINGS:    Chest:    There is adequate opacification of the main and lobar pulmonary  arteries. No filling defect in the lobar and main segmental pulmonary  arteries to suggest acute pulmonary embolism. There is significant  improvement in filling defects within the right main pulmonary artery  and right lower lobe segmental pulmonary arteries. This study is  however compromised somewhat by motion artifact.    Cardiomegaly, similar to prior there is evidence for right heart  dysfunction with dilated pulmonary artery to 4.1 cm, reflux of  contrast into the IVC and hepatic veins. No mediastinal, hilar or  axillary lymphadenopathy. Hiatal hernia.    Lungs and airway: Central tracheobronchial tree is clear. There is  diffuse septal thickening and gravitationally dependent groundglass  opacities. There are bilateral pleural effusions right greater than  left. No pneumothorax. No focal consolidative opacity.    Upper Abdomen: Evaluation of upper abdomen is limited. There are  extensive atherosclerotic calcifications of the abdominal aorta as  well as the splenic vessels.    Bones and soft tissues: Compression deformity of T5. Degenerative  changes of the spine. No aggressive osseous lesions. Diastases rectus.  Soft tissues otherwise unremarkable.      Impression:       IMPRESSION:   1. No acute pulmonary embolism. 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  2. Evidence of heart failure with elevated right heart dysfunction,  dilated pulmonary artery and moderate pulmonary edema and new small  bilateral pleural effusions right greater than left.    I have personally reviewed the examination and initial interpretation  and I agree with the findings.    INDER BELLAMY MD      Head CT w/o contrast [[id number]]  Resulted: 03/03/17 1224, Result status: Final  result    Ordering provider: Mannie Lovell MD  03/03/17 1108 Resulted by: Cherelle Mckeon MD Lee, Mark Theodore, DO    Performed: 03/03/17 1126 - 03/03/17 1150 Resulting lab: RADIOLOGY RESULTS    Narrative:       CT HEAD W/O CONTRAST 3/3/2017 11:50 AM    Provided History: Altered mental status    Comparison: CT head 8/30/2016 and 7/14/2016..    Technique: Using multidetector thin collimation helical acquisition  technique, axial, coronal and sagittal CT images from the skull base  to the vertex were obtained without intravenous contrast.     Findings:    No intracranial hemorrhage, mass effect, or midline shift. The gray to  white matter differentiation of the cerebral hemispheres is preserved.  The basal cisterns are patent. Moderate generalized cerebral and  cerebellar parenchymal volume loss. Ventricles are not enlarged out of  proportion to the cerebral sulci. Stable 13 mm complex cystic lesion  with layering debris in the right parotid gland (series 3 image 30),  unchanged dating back to at least 7/14/2016. Scattered foci of  intravenous gas of the skull base. Calvarium and visualized facial  bones of are intact.    There is polypoid mucosal thickening in the right sphenoid locule and  anterior ethmoid air cells.. The mastoid air cells are clear.       Impression:       Impression:   1. No acute intracranial pathology. No significant change since  8/30/2016.  2. 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 Consider ultrasound, if clinically  indicated.    I have personally reviewed the examination and initial interpretation  and I agree with the findings.    CHERELLE MCKEON MD      Testing Performed By     Lab - Abbreviation Name Director Address Valid Date Range    104 - Rad Rslts RADIOLOGY RESULTS Unknown Unknown 02/16/05 1553 - Present               ECG/EMG Results      Echo Complete with Lumason [[id number]]  Resulted: 03/03/17 1519, Result  status: Edited Result - FINAL    Ordering provider: Mannie Helms MD  17 1513 Resulted by: NITA Kign MD    Performed: 17 1554 - 17 1555 Resulting lab: RADIOLOGY RESULTS    Narrative:       [id number]  [id number]  [id number]  [id number]^SCOOTER^MANNIE^           [facility],[place]  Echocardiography Laboratory  [address] [place] [id number]     Name: COTY VELASCO  MRN: [id number]  : 1921  Study Date: 2017 03:19 PM  Age: 89 yrs  Gender: Female  Patient Location: [facility]  Reason For Study: Heart Failure  Ordering Physician: MANNIE HELMS  Performed By: Constantino Roberts RDCS     BSA: 2.0 m2  Height: 65 in  Weight: 201 lb  BP: 129/84 mmHg  _____________________________________________________________________________  __        Procedure  Complete Portable Echo Adult. Lumason (NDC #5100-9280-47) given intravenously.  Patient was given 5ml mixture of Lumason. 0 ml wasted.  _____________________________________________________________________________  __        Interpretation Summary  Mild left ventricular dilation is present.  The Ejection Fraction is estimated at 15-20%.  Right ventricular function, chamber size, wall motion, and thickness are  normal.  Dilation of the inferior vena cava is present with abnormal respiratory  variation in diameter.  No pericardial effusion is present.  _____________________________________________________________________________  __        Left Ventricle  Mild left ventricular dilation is present. The Ejection Fraction is estimated  at 15-20%. Left ventricular diastolic function is indeterminate. Severe  diffuse hypokinesis is present.     Right Ventricle  Right ventricular function, chamber size, wall motion, and thickness are  normal. A pacemaker lead is noted in the right ventricle.     Atria  Severe biatrial enlargement is present.        Mitral Valve  Mild mitral annular calcification is present. Mild mitral insufficiency  is  present.     Aortic Valve  Mild aortic valve sclerosis is present.     Tricuspid Valve  Moderate tricuspid insufficiency is present. The right ventricular systolic  pressure is approximated at 19.9 mmHg plus the right atrial pressure.     Pulmonic Valve  The pulmonic valve is normal. Trace pulmonic insufficiency is present.     Vessels  The aorta root is normal. The pulmonary artery is normal. Ascending aorta 3.8  cm. Dilation of the inferior vena cava is present with abnormal respiratory  variation in diameter.     Pericardium  No pericardial effusion is present.     _____________________________________________________________________________  __  MMode/2D Measurements & Calculations  IVSd: 1.0 cm  LVIDd: 6.3 cm  LVIDs: 6.1 cm  LVPWd: 1.4 cm     FS: 2.4 %  EDV(Teich): 200.5 ml  ESV(Teich): 189.7 ml  LV mass(C)d: 333.9 grams  asc Aorta Diam: 3.8 cm  LVOT diam: 2.2 cm  LVOT area: 3.8 cm2  LA Volume (BP): 224.0 ml  LA Volume Index (BP): 113.1 ml/m2        Doppler Measurements & Calculations  MV E max gloria: 70.8 cm/sec  TR max gloria: 221.8 cm/sec  TR max P.9 mmHg     Lateral E/e': 9.2  Medial E/e': 11.3           _____________________________________________________________________________  __           Report approved by: Akanksha Bateman 2017 04:12 PM       1    Type Source Collected On     17 1519          View Image (below)        Echocardiogram Complete [[id number]]  Resulted: 17 1554, Result status: In process    Ordering provider: Mannie Lovell MD  17 1513 Performed: 17 1554 - 17 1554    Resulting lab: RADIANT                   Encounter-Level Documents:     There are no encounter-level documents.      Order-Level Documents:     There are no order-level documents.

## 2017-03-03 NOTE — IP AVS SNAPSHOT
MRN:[id number]                      After Visit Summary   3/3/2017    Ankita Pham    MRN: [id number]           Thank you!     Thank you for choosing [facility] for your care. Our goal is always to provide you with excellent care. Hearing back from our patients is one way we can continue to improve our services. Please take a few minutes to complete the written survey that you may receive in the mail after you visit with us. Thank you!        Patient Information     Date Of Birth          8/3/1921        About your hospital stay     You were admitted on:  March 3, 2017 You last received care in the:  [address] [facility] [place]    You were discharged on:  March 6, 2017        Reason for your hospital stay       Encephalopathy and subacute small bowel obstruction                  Who to Call     For medical emergencies, please call 911.  For non-urgent questions about your medical care, please call your primary care provider or clinic, None          Attending Provider     Provider Specialty    Mannie Lovell MD Emergency Medicine    Maricruz Patel MD Emergency Medicine    Guru Lewis MD Pediatrics    Carin Del Angel MD Internal Medicine       Primary Care Provider    [facility] Other Clinic                After Care Instructions     Activity - Up with nursing assistance       Patient is largely bed bound. Encourage with PT to get out if possible            Advance Diet as Tolerated       Follow this diet upon discharge: Orders Placed This Encounter      Fluid restriction 1500 ML FLUID      Regular Diet Adult            Daily weights       Call Provider for weight gain of more than 2 pounds per day or 5 pounds per week.            [place] catheter       To straight gravity drainage. Change catheter every 2 weeks and PRN for leaking or decreased uring output with signs of bladder distention. DO NOT change catheter without a specific MD order IF diagnosis of benign prostatic  hypertrophy (BPH), neurogenic bladder, or other urological conditions            General info for SNF       Length of Stay Estimate: Long Term Care  Condition at Discharge: Improving  Level of care:skilled   Rehabilitation Potential: Poor  Admission H&P remains valid and up-to-date: Yes  Recent Chemotherapy: N/A  Use Nursing Home Standing Orders: Yes            Mantoux instructions       Give two-step Mantoux (PPD) Per Facility Policy Yes            Oxygen - Nasal cannula       2  Lpm by nasal cannula to keep O2 sats 88% or greater.   Please use incentive spirometer if patient is not comfortable with Oxygen            Weight bearing status       Patient has stopped getting out of bed or sitting in chair.   All therapy to be confined to bed bound exercises, unless we can re -evaluate her to see if we can start getting out of bed with assistance.   No contraindication for weight bearing                  Follow-up Appointments     Follow Up and recommended labs and tests       Follow up with NH physician in 1 week of discharge  Please follow up with Cardiology CORE clinic within 4 weeks of discharge                  Your next 10 appointments already scheduled     Apr 06, 2017  2:30 PM CDT   (Arrive by 2:15 PM)   Return Visit with Sean Fernandez MD   [facility] ([facility])    [address]  [address]rd [facility] [id number] [phone]            May 16, 2017 10:00 AM CDT   Phone Device Check with ACKERMAN TECH1   [facility] [place] ([facility])    [address] [place] [id number] [phone]              Additional Services     CARDIOVASCULAR CORE CLINIC REFERRAL       Chronic systolic heart failure            Occupational Therapy Adult Consult       Evaluate and treat as clinically indicated.    Reason:  Deconditioning            Occupational Therapy Adult Consult       Evaluate and treat as clinically  "indicated.    Reason: Lymphedema therapy            Physical Therapy Adult Consult       Evaluate and treat as clinically indicated.    Reason: Deconditioning                  Future tests that were ordered for you     Pneumatic Compression Device        Bilateral calf. Remove 30 mins BID.                  Further instructions from your care team       You will be contacted by the [facility] CORE clinic within 2 days of discharge to schedule your follow up appointment. This appointment should be within 1 week of discharge. If you do not hear back from them within 48 hours of discharge, please call [phone].    General Recommendations To Control Heart Failure When You Get Home     Instructions To Patients and Families: Please read and check off each of these important instructions as you do them when you get home.           Weight and symptoms      ___ Put a scale in your bathroom  ___ Post a weight chart or calendar next to the scale  ___Weigh yourself every day as soon as you you get up in the morning. You should only be wearing your pajamas. Write your weight on the chart/calendar.  ___ Bring your weight chart/calendar with you to all appointments    ___Call your doctor if you gain 2 pounds in 1 day or 5 pounds in 1 week from your \"dry\" weight (baseline weight). Also call your doctor if you have shortness of breath that gets worse over time, leg swelling or fatigue.         Medicines and diet     ___ Make sure to take your medicines as prescribed.    ___Bring a current list of your medicines and all of your medicine bottles with you to all appointments.    ___ Limit fluids if you still have swelling or shortness of breath, or if your doctor tells you to do so.  ___ Eat less than 2000 mg of sodium (salt) every day. Read food labels, and do not add salt to meals.   ___ Heart healthy diet with low fat and low cholesterol          Activity and suggested lifestyle changes    ___ Stay active. Talk to your doctor about an " exercise program that is safe for your heart.    ___ Stop smoking. Reduce alcohol use.      ___ Lose weight if you are overweight. Extra weight puts a lot of stress on the heart.          Control for Leg Swelling   ___ Keep your legs elevated (raised) as needed for swelling. If swelling is uncomfortable or elevation doesn t help, ask your doctor about using ACE wrap or Jobst stockings.          Follow-up appointments   ___ Make a C.O.R.E. Clinic appointment with a basic metabolic panel lab draw 3 to 5 days after you leave the hospital. Call one of the following locations:   [facility]  [phone],  [facility] [phone],  [facility]  [phone].     ___ Make sure to take your medications as prescribed and bring an accurate list of your medications and your weight chart/calendar to your follow up appointment at the C.O.R.E. Clinic for continued education and adjustments          What is the CORE clinic?    The C.O.R.E (Cardiomyopathy, Optimization, Rehabilitation, Education) Clinic is a heart failure specialty clinic within the [facility]. At C.O.R.E., you will work with nurse practitioners to carefully adjust medicines, get education and learn who and when to call if symptoms appear. C.O.R.E nurses specialize in helping you:    better understand your disease.    slow the progress of your disease.    improve the length and quality of your life.    detect future heart problems before they become life threatening.    avoid hospital stays.            Pending Results     Date and Time Order Name Status Description    3/3/2017 2152 EKG 12-lead, complete Preliminary     3/3/2017 1109 Blood culture Preliminary     3/3/2017 1109 Blood culture Preliminary             Statement of Approval     Ordered          03/06/17 1127  I have reviewed and agree with all the recommendations and orders  "detailed in this document.  EFFECTIVE NOW     Approved and electronically signed by:  Carin Del Angel MD             Admission Information     Date & Time Provider Department Dept. Phone    3/3/2017 Carin Del Angel MD [address] [facility] [phone]      Your Vitals Were     Blood Pressure Pulse Temperature Respirations Height Weight    112/89 103 97.8  F (36.6  C) (Oral) 16 1.651 m (5' 5\") 84.8 kg (187 lb)    Pulse Oximetry BMI (Body Mass Index)                87% 31.12 kg/m2          MyChart Information     [employer] lets you send messages to your doctor, view your test results, renew your prescriptions, schedule appointments and more. To sign up, go to [email or web address] . Click on \"Log in\" on the left side of the screen, which will take you to the Welcome page. Then click on \"Sign up Now\" on the right side of the page.     You will be asked to enter the access code listed below, as well as some personal information. Please follow the directions to create your username and password.     Your access code is: [email or web address]  Expires: 2017 11:44 AM     Your access code will  in 90 days. If you need help or a new code, please call your [facility] clinic or [phone].        Care EveryWhere ID     This is your Care EveryWhere ID. This could be used by other organizations to access your [facility] medical records  [id number]           Review of your medicines      START taking        Dose / Directions    cefpodoxime 200 MG tablet   Commonly known as:  VANTIN   Indication:  Urinary Tract Infection   Used for:  Urinary tract infection without hematuria, site unspecified        Dose:  200 mg   Take 1 tablet (200 mg) by mouth 2 times daily for 3 days   Quantity:  6 tablet   Refills:  0       furosemide 40 MG tablet   Commonly known as:  LASIX   Used for:  Cardiac pacemaker in situ        Dose:  40 mg   Take 1 tablet (40 mg) by mouth daily   Quantity:  30 tablet   Refills:  0    "    lisinopril 5 MG tablet   Commonly known as:  PRINIVIL/ZESTRIL   Used for:  Hypertension goal BP (blood pressure) < 140/90        Dose:  5 mg   Take 1 tablet (5 mg) by mouth 2 times daily   Quantity:  30 tablet   Refills:  0         CONTINUE these medicines which may have CHANGED, or have new prescriptions. If we are uncertain of the size of tablets/capsules you have at home, strength may be listed as something that might have changed.        Dose / Directions    LORazepam 1 MG tablet   Commonly known as:  ATIVAN   This may have changed:    - medication strength  - how much to take   Used for:  Delirium        Dose:  0.5 mg   Take 0.5 tablets (0.5 mg) by mouth daily as needed for anxiety   Quantity:  30 tablet   Refills:  0       oxyCODONE 5 MG IR tablet   Commonly known as:  ROXICODONE   This may have changed:    - how much to take  - when to take this  - Another medication with the same name was removed. Continue taking this medication, and follow the directions you see here.   Used for:  Epidural hematoma (H)        Dose:  5 mg   Take 1 tablet (5 mg) by mouth every 8 hours as needed for moderate to severe pain   Quantity:  40 tablet   Refills:  0       polyethylene glycol Packet   Commonly known as:  MIRALAX/GLYCOLAX   This may have changed:  when to take this   Used for:  Slow transit constipation        Dose:  17 g   Take 17 g by mouth daily   Quantity:  7 packet   Refills:  0       SIMETHICONE-80 PO   This may have changed:  Another medication with the same name was removed. Continue taking this medication, and follow the directions you see here.        Dose:  80 mg   Take 80 mg by mouth 2 times daily as needed for intestinal gas in addition to scheduled doses   Refills:  0         CONTINUE these medicines which have NOT CHANGED        Dose / Directions    acetaminophen 500 MG tablet   Commonly known as:  TYLENOL   Used for:  Chronic pain syndrome        Dose:  1000 mg   Take 2 tablets (1,000 mg) by mouth 3  times daily   Quantity:  60 tablet   Refills:  1       albuterol (2.5 MG/3ML) 0.083% neb solution   Used for:  Shortness of breath        Dose:  1 vial   Take 1 vial (2.5 mg) by nebulization every 6 hours as needed for shortness of breath / dyspnea or wheezing   Quantity:  360 mL   Refills:  0       ascorbic acid 500 MG Tabs        Dose:  500 mg   Take 1 tablet (500 mg) by mouth daily   Quantity:  30 tablet   Refills:  0       aspirin  MG EC tablet   Used for:  Other specified cardiac dysrhythmias(427.89)        Dose:  325 mg   Take 325 mg by mouth daily   Quantity:  40 tablet   Refills:  0       bisacodyl 10 MG Suppository   Commonly known as:  DULCOLAX   Used for:  Ileus (H)        Dose:  10 mg   Place 1 suppository (10 mg) rectally daily as needed for constipation   Quantity:  30 suppository   Refills:  0       CELEXA PO        Dose:  10 mg   Take 10 mg by mouth daily   Refills:  0       hypromellose 0.5 % Soln ophthalmic solution   Commonly known as:  ARTIFICIAL TEARS        Dose:  1 drop   Place 1 drop into both eyes 3 times daily   Refills:  0       lactulose 10 GM/15ML solution   Commonly known as:  CHRONULAC        Dose:  30 mL   Take 30 mLs by mouth daily as needed for constipation   Refills:  0       levothyroxine 75 MCG tablet   Commonly known as:  SYNTHROID/LEVOTHROID   Used for:  Unspecified hypothyroidism        Dose:  75 mcg   Take 1 tablet (75 mcg) by mouth daily   Quantity:  90 tablet   Refills:  3       magnesium oxide 400 MG tablet   Commonly known as:  MAG-OX   Used for:  Ileus (H)        Dose:  400 mg   Take 1 tablet (400 mg) by mouth daily   Quantity:  60 tablet   Refills:  0       METOPROLOL SUCCINATE ER PO        Dose:  12.5 mg   Take 12.5 mg by mouth At Bedtime   Refills:  0       multivitamin  with lutein Caps per capsule   Used for:  Physical deconditioning        Dose:  1 capsule   Take 1 capsule by mouth daily   Refills:  0       NEURONTIN PO        Dose:  200 mg   Take 200 mg by  mouth daily   Refills:  0       omeprazole 20 MG tablet   Used for:  Abdominal pain, other specified site        Dose:  20 mg   Take 1 tablet (20 mg) by mouth 2 times daily   Quantity:  30 tablet   Refills:  0       potassium chloride SA 20 MEQ CR tablet   Commonly known as:  K-DUR/KLOR-CON M   Used for:  Ileus (H)        Dose:  20 mEq   Take 1 tablet (20 mEq) by mouth daily   Quantity:  60 tablet   Refills:  0       senna-docusate 8.6-50 MG per tablet   Commonly known as:  SENOKOT-S;PERICOLACE   Used for:  Slow transit constipation        Dose:  2 tablet   Take 2 tablets by mouth 2 times daily   Quantity:  100 tablet   Refills:  0       ZOFRAN PO        Dose:  4 mg   Take 4 mg by mouth every 6 hours as needed for nausea or vomiting   Refills:  0            Where to get your medicines      Some of these will need a paper prescription and others can be bought over the counter. Ask your nurse if you have questions.     Bring a paper prescription for each of these medications     LORazepam 1 MG tablet    oxyCODONE 5 MG IR tablet       You don't need a prescription for these medications     cefpodoxime 200 MG tablet    furosemide 40 MG tablet    lisinopril 5 MG tablet    polyethylene glycol Packet    senna-docusate 8.6-50 MG per tablet                Protect others around you: Learn how to safely use, store and throw away your medicines at www.disposemymeds.org.             Medication List: This is a list of all your medications and when to take them. Check marks below indicate your daily home schedule. Keep this list as a reference.      Medications           Morning Afternoon Evening Bedtime As Needed    acetaminophen 500 MG tablet   Commonly known as:  TYLENOL   Take 2 tablets (1,000 mg) by mouth 3 times daily   Last time this was given:  1,000 mg on 3/6/2017  2:04 PM                                albuterol (2.5 MG/3ML) 0.083% neb solution   Take 1 vial (2.5 mg) by nebulization every 6 hours as needed for shortness  of breath / dyspnea or wheezing                                ascorbic acid 500 MG Tabs   Take 1 tablet (500 mg) by mouth daily   Last time this was given:  500 mg on 3/6/2017  9:50 AM                                aspirin  MG EC tablet   Take 325 mg by mouth daily   Last time this was given:  325 mg on 3/6/2017  8:07 AM                                bisacodyl 10 MG Suppository   Commonly known as:  DULCOLAX   Place 1 suppository (10 mg) rectally daily as needed for constipation                                cefpodoxime 200 MG tablet   Commonly known as:  VANTIN   Take 1 tablet (200 mg) by mouth 2 times daily for 3 days   Last time this was given:  200 mg on 3/6/2017  8:07 AM                                CELEXA PO   Take 10 mg by mouth daily   Last time this was given:  10 mg on 3/6/2017  8:06 AM                                furosemide 40 MG tablet   Commonly known as:  LASIX   Take 1 tablet (40 mg) by mouth daily   Last time this was given:  40 mg on 3/6/2017  8:06 AM                                hypromellose 0.5 % Soln ophthalmic solution   Commonly known as:  ARTIFICIAL TEARS   Place 1 drop into both eyes 3 times daily                                lactulose 10 GM/15ML solution   Commonly known as:  CHRONULAC   Take 30 mLs by mouth daily as needed for constipation                                levothyroxine 75 MCG tablet   Commonly known as:  SYNTHROID/LEVOTHROID   Take 1 tablet (75 mcg) by mouth daily   Last time this was given:  75 mcg on 3/6/2017  8:07 AM                                lisinopril 5 MG tablet   Commonly known as:  PRINIVIL/ZESTRIL   Take 1 tablet (5 mg) by mouth 2 times daily   Last time this was given:  5 mg on 3/6/2017  8:06 AM                                LORazepam 1 MG tablet   Commonly known as:  ATIVAN   Take 0.5 tablets (0.5 mg) by mouth daily as needed for anxiety                                magnesium oxide 400 MG tablet   Commonly known as:  MAG-OX   Take 1  tablet (400 mg) by mouth daily   Last time this was given:  400 mg on 3/6/2017  8:07 AM                                METOPROLOL SUCCINATE ER PO   Take 12.5 mg by mouth At Bedtime   Last time this was given:  12.5 mg on 3/6/2017  8:06 AM                                multivitamin  with lutein Caps per capsule   Take 1 capsule by mouth daily   Last time this was given:  1 capsule on 3/6/2017  8:06 AM                                NEURONTIN PO   Take 200 mg by mouth daily                                omeprazole 20 MG tablet   Take 1 tablet (20 mg) by mouth 2 times daily                                oxyCODONE 5 MG IR tablet   Commonly known as:  ROXICODONE   Take 1 tablet (5 mg) by mouth every 8 hours as needed for moderate to severe pain                                polyethylene glycol Packet   Commonly known as:  MIRALAX/GLYCOLAX   Take 17 g by mouth daily   Last time this was given:  17 g on 3/5/2017  2:03 PM                                potassium chloride SA 20 MEQ CR tablet   Commonly known as:  K-DUR/KLOR-CON M   Take 1 tablet (20 mEq) by mouth daily   Last time this was given:  20 mEq on 3/6/2017  2:02 PM                                senna-docusate 8.6-50 MG per tablet   Commonly known as:  SENOKOT-S;PERICOLACE   Take 2 tablets by mouth 2 times daily                                SIMETHICONE-80 PO   Take 80 mg by mouth 2 times daily as needed for intestinal gas in addition to scheduled doses                                ZOFRAN PO   Take 4 mg by mouth every 6 hours as needed for nausea or vomiting

## 2017-03-03 NOTE — PROGRESS NOTES
Care Coordinator Progress Note     Admission Date/Time:  3/3/2017  Attending MD:  Mannie Lovell MD     Data  Chart reviewed, discussed with interdisciplinary team.   Patient was admitted for:    Delirium  Colon distention.    Concerns with insurance coverage for discharge needs: No concerns - [employer] (combined Medicare and [place] [employer] plan).    Current Living Situation: Patient lives in a long term care facility.    [facility]  [address] [place] [address]  Admissions:  [phone]  Level of care:  Long-term care  Bed Held?  Yes - MA bed hold    Advanced Directive on file indicating health care agent as sonChandu [phone].  No alternative HCA named.  POLST also on file.      Support System: Supportive and Involved son, daughter-in-law    Services Involved: Skilled Nursing Facility, Long term care     Transportation: Did not assess today     Barriers to Discharge: None noted today.  Acute medical illness currently, will likely have clear dispo back to SNF when medically ready.    Coordination of Care and Referrals: Met very briefly with patient and her daughter-in-law, Erik, in ED this afternoon to confirm above assessment information. Patient not participating in conversation - undergoing bedside US at the time and moaning in pain.       Assessment  Patient with adequate level of care in the community at long-term care facility, acutely ill.     Plan  Anticipated Discharge Date:  Pending clinical course  Anticipated Discharge Plan:  Return to SNF for long-term care.    David Cruz, RN, PHN, ACM  RN Care Coordinator     [facility]   Emergency Department and 6D Observation Unit  [address] [place] [address]   [email or web address]  [email or web address]   Office: [phone]  Pager: [phone]  Please call or e-mail for fax contact information.

## 2017-03-03 NOTE — CONSULTS
CR surgery consult note  Date 03/03/2017    We were asked by Dr Lovell of the ED to evaluate this patient with colonic distention.    HPI: Ms Pham is a 89 year old woman who presented to our ED from her nursing home with AMS and abdominal distention. She reports pain in her back and arms, as well as in her abdomen when specifically asked. She reports that she is passing gas and had a bowel movement this morning. She denies diarrhea or hard stools, however she has liquid stool present in her diaper. According to her daughter in law she has begun to refuse ambulating at her home, takes oxycodone for generalized pain, and had a similar episode about two years ago.       Past Medical History   Diagnosis Date     Acute, but ill-defined, cerebrovascular disease 1975     incontinent     Cardiac dysrhythmia, unspecified      sees Dr. Marcus     Cardiac pacemaker in situ 1999     Chronic peptic ulcer, unspecified site, without mention of hemorrhage, perforation, or obstruction      Diaphragmatic hernia without mention of obstruction or gangrene      Edema      Esophageal reflux 12/13/2006     Essential hypertension, benign      Heart disease, unspecified      Irritable bowel syndrome      Lymphedema      Myalgia and myositis, unspecified      Open wound of heel 3/31/2014     Right heel.     Osteoarthrosis, unspecified whether generalized or localized, unspecified site      Dr. Ashly Palacios     Other abnormal glucose 2005     Other chronic pulmonary heart diseases      Rotator cuff (capsule) sprain      left, Dr. Paredes didn't think surgery worth risks     Unspecified hypothyroidism      Unspecified hypothyroidism      Unspecified sleep apnea      Urinary incontinence 8/1/2014     Past Surgical History   Procedure Laterality Date     Surgical history of -        Ankle fracture Tib     Surgical history of -        TIA'a     Surgical history of -        Thyroiditis x3 remote     Surgical history of -        Ulnar nerve  transpostion     Surgical history of -        Pacemaker (sinus phases, bradycardia)     Surgical history of -        L atrial pacer     Surgical history of -        appy     Surgical history of -        Benign breast biopsy     Endoscopic release carpal tunnel  10/26/2011     Procedure:ENDOSCOPIC RELEASE CARPAL TUNNEL; RIGHT ENDOSCOPIC CARPAL TUNNEL RELEASE, RIGHT LONG AND RING A-1 JAMIL RELEASE; Surgeon:KATE MARRERO; Location: [facility]     Release trigger finger  10/26/2011     Procedure:RELEASE TRIGGER FINGER; Surgeon:KATE MARRERO; Location: [facility]     Incision and drainage sacral wound, combined N/A 2014     Procedure: COMBINED INCISION AND DRAINAGE SACRAL WOUND;  Surgeon: Osorio Morgan MD;  Location: [facility] OR     Family History   Problem Relation Age of Onset     Family History Negative Mother      HEART DISEASE Father      HEART DISEASE Sister      CHF     HEART DISEASE Brother      multiple MI     Respiratory Brother      COPD     HEART DISEASE Brother      MI     HEART DISEASE Brother       during open heart surgery     DIABETES No family hx of      Hypertension No family hx of      Social History     Social History     Marital status:      Spouse name: N/A     Number of children: N/A     Years of education: N/A     Occupational History     Not on file.     Social History Main Topics     Smoking status: Former Smoker     Years: 10.00     Quit date: 1966     Smokeless tobacco: Never Used     Alcohol use 28.0 oz/week      Comment: 4 oz of wine  each evening     Drug use: No     Sexual activity: Not Currently     Partners: Male     Other Topics Concern     Parent/Sibling W/ Cabg, Mi Or Angioplasty Before 65f 55m? No     Social History Narrative    Daughter in law does grocery shopping    Son helps her around the home if needed.    One daughter in [place], [place], [place] - one daughter has passed away.     Current Facility-Administered Medications   Medication  "    0.9% sodium chloride infusion     Current Outpatient Prescriptions   Medication     OXYCODONE HCL PO     METOPROLOL SUCCINATE ER PO     SIMETHICONE-80 PO     oxyCODONE (ROXICODONE) 5 MG immediate release tablet     aspirin  MG tablet     Gabapentin (NEURONTIN PO)     Citalopram Hydrobromide (CELEXA PO)     lactulose (CHRONULAC) 10 GM/15ML solution     LORAZEPAM PO     acetaminophen (TYLENOL) 500 MG tablet     magnesium oxide (MAG-OX) 400 MG tablet     albuterol (2.5 MG/3ML) 0.083% nebulizer solution     bisacodyl (DULCOLAX) 10 MG suppository     polyethylene glycol (MIRALAX/GLYCOLAX) packet     senna-docusate (SENOKOT-S;PERICOLACE) 8.6-50 MG per tablet     omeprazole 20 MG tablet     potassium chloride SA (K-DUR,KLOR-CON M) 20 MEQ tablet     SIMETHICONE-80 PO     Ondansetron HCl (ZOFRAN PO)     multivitamin  with lutein (OCUVITE WITH LTEIN) CAPS     hypromellose (ARTIFICIAL TEARS) 0.5 % SOLN     levothyroxine (SYNTHROID, LEVOTHROID) 75 MCG tablet     ascorbic acid 500 MG TABS        Allergies   Allergen Reactions     Cortisone Other (See Comments)     Mental changes      Nuts Anaphylaxis     ROS: limited by patient AMS      Exam: /67  Pulse 115  Temp 97.6  F (36.4  C) (Axillary)  Resp 12  Ht 1.651 m (5' 5\")  Wt 91.3 kg (201 lb 3.2 oz)  SpO2 99%  Breastfeeding? No  BMI 33.48 kg/m2  Resting in bed, moaning intermittently  Answering simple questions appropriately  Abdomen soft, distended, tender to deep palpation, tympanitic  Rectal exam with normal sphincter tone, large volume soft-liquid stool in vault, no blood, moderate amount of gas and liquid stool discharged  Extremities edematous  Non-labored breathing      Labs and imaging reviewed    Recent Labs  Lab 03/03/17  1248 03/03/17  1103   NA  --  140   POTASSIUM 4.1 5.0   CHLORIDE  --  103   CO2  --  24   ANIONGAP  --  12   GLC  --  146*   BUN  --  16   CR  --  0.51*   OMERO  --  8.4*   PROTTOTAL  --  6.8   ALBUMIN  --  3.1*   BILITOTAL  --  0.6 "   ALKPHOS  --  88   ALT  --  7     CBC  Recent Labs  Lab 03/03/17  1103   WBC 11.9*   RBC 4.66   HGB 14.7   HCT 45.5   MCV 98   MCH 31.5   MCHC 32.3   RDW 14.6        INR  Recent Labs  Lab 03/03/17  1103   INR 1.11       CT abdomen with distended colon without free air, circumferential rectal thickening, apparent air-fluid level in distended rectosigmoid. Stable CBD dilation.         A/P: This is a 89 year old woman who presented to our ED from her nursing home with some altered mental status and abdominal distention. No indication for surgical management at this time, patient has also clearly expressed that surgery would be against her wishes.    Her clinical picture is consistent with colonic dysfunction without evidence of mechanical obstruction. Agree with medical admission, cardiology consult for rising troponin, and GI involvement for work up/treatment of colon dysfunction. Will follow, page resident with questions/concerns.    Mannie Lin  [facility] surgery resident

## 2017-03-03 NOTE — LETTER
Transition Communication Hand-off for Care Transitions to Next Level of Care Provider    Name: Ankita Pham  MRN #: [id number]  Primary Care Provider: Clinic Other     Primary Clinic:       Reason for Hospitalization:  Delirium [R41.0]  Colon distention [K63.89]  Admit Date/Time: 3/3/2017 10:58 AM  Discharge Date: 3/6/17  Payor Source: Payor: MEDICA / Plan: [employer] [facility] [employer]/[facility] PART[employer]ERS / Product Type: Indemnity /     Reason for Communication Hand-off Referral: Fragility    Follow-up plan:  Future Appointments  Date Time Provider Department Center   5/16/2017 10:00 AM [facility]       Any outstanding tests or procedures:    Procedures     Future Labs/Procedures    Oxygen - Nasal cannula     Comments:    2  Lpm by nasal cannula to keep O2 sats 88% or greater.   Please use incentive spirometer if patient is not comfortable with Oxygen          Referrals     Future Labs/Procedures    CARDIOVASCULAR CORE CLINIC REFERRAL     Comments:    Chronic systolic heart failure    Occupational Therapy Adult Consult     Comments:    Evaluate and treat as clinically indicated.    Reason:  Deconditioning    Occupational Therapy Adult Consult     Comments:    Evaluate and treat as clinically indicated.    Reason: Lymphedema therapy    Physical Therapy Adult Consult     Comments:    Evaluate and treat as clinically indicated.    Reason: Deconditioning        Supplies     Future Labs/Procedures    Pneumatic Compression Device      Comments:    Bilateral calf. Remove 30 mins BID.          Key Recommendations:  Please review AVS  Adriane Acevedo RN, BSN  Care Coordinator [facility]  Pager: [phone]  Phone: [phone]    AVS/Discharge Summary is the source of truth; this is a helpful guide for improved communication of patient story

## 2017-03-03 NOTE — ED PROVIDER NOTES
History     Chief Complaint   Patient presents with     Altered Mental Status     HPI  Ankita Pham is a 89 year old female with a history of pulmonary embolism, epidural hematoma, afib with a ventricular pacemaker, SVTs, sick sinus syndrome and pulmonary hypertension on aspirin 325mg daily who presents to the Emergency Department via EMS from [facility] for evaluation of alerted mental status. Per EMS, the patient's care facility contacted them after 2-2.5 hours of altered mental status with O2 saturation 85% on room air and increased to 95% on 2 liters via NC. En route, they noticed her abdomen was distended and was increasing. Blood sugar 154, blood pressure 122/89. The patient will open her eyes to voice but they could not make out anything she was saying and has been constantly moaning.  In the emergency department, the patient complains of abdominal pain.    Past Medical History   Diagnosis Date     Acute, but ill-defined, cerebrovascular disease 1975     incontinent     Cardiac dysrhythmia, unspecified      sees Dr. Marcus     Cardiac pacemaker in situ 1999     Chronic peptic ulcer, unspecified site, without mention of hemorrhage, perforation, or obstruction      Diaphragmatic hernia without mention of obstruction or gangrene      Edema      Esophageal reflux 12/13/2006     Essential hypertension, benign      Heart disease, unspecified      Irritable bowel syndrome      Lymphedema      Myalgia and myositis, unspecified      Open wound of heel 3/31/2014     Right heel.     Osteoarthrosis, unspecified whether generalized or localized, unspecified site      Dr. Ashly Palacios     Other abnormal glucose 2005     Other chronic pulmonary heart diseases      Rotator cuff (capsule) sprain      left, Dr. Paredes didn't think surgery worth risks     Unspecified hypothyroidism      Unspecified hypothyroidism      Unspecified sleep apnea      Urinary incontinence 8/1/2014       Past Surgical History   Procedure Laterality  Date     Surgical history of -        Ankle fracture Tib     Surgical history of -        TIA'a     Surgical history of -        Thyroiditis x3 remote     Surgical history of -        Ulnar nerve transpostion     Surgical history of -        Pacemaker (sinus phases, bradycardia)     Surgical history of -        L atrial pacer     Surgical history of -        appy     Surgical history of -        Benign breast biopsy     Endoscopic release carpal tunnel  10/26/2011     Procedure:ENDOSCOPIC RELEASE CARPAL TUNNEL; RIGHT ENDOSCOPIC CARPAL TUNNEL RELEASE, RIGHT LONG AND RING A-1 JAMIL RELEASE; Surgeon:KATE MARRERO; Location: [facility]     Release trigger finger  10/26/2011     Procedure:RELEASE TRIGGER FINGER; Surgeon:KATE MARRERO; Location: [facility]     Incision and drainage sacral wound, combined N/A 2014     Procedure: COMBINED INCISION AND DRAINAGE SACRAL WOUND;  Surgeon: Osorio Morgan MD;  Location: [facility] OR       Family History   Problem Relation Age of Onset     Family History Negative Mother      HEART DISEASE Father      HEART DISEASE Sister      CHF     HEART DISEASE Brother      multiple MI     Respiratory Brother      COPD     HEART DISEASE Brother      MI     HEART DISEASE Brother       during open heart surgery     DIABETES No family hx of      Hypertension No family hx of        Social History   Substance Use Topics     Smoking status: Former Smoker     Years: 10.00     Quit date: 1966     Smokeless tobacco: Never Used     Alcohol use 28.0 oz/week      Comment: 4 oz of wine  each evening       Current Facility-Administered Medications   Medication     lisinopril (PRINIVIL/Zestril) tablet 2.5 mg     Current Outpatient Prescriptions   Medication     OXYCODONE HCL PO     METOPROLOL SUCCINATE ER PO     SIMETHICONE-80 PO     oxyCODONE (ROXICODONE) 5 MG immediate release tablet     aspirin  MG tablet     Gabapentin (NEURONTIN PO)     Citalopram  Hydrobromide (CELEXA PO)     lactulose (CHRONULAC) 10 GM/15ML solution     LORAZEPAM PO     acetaminophen (TYLENOL) 500 MG tablet     magnesium oxide (MAG-OX) 400 MG tablet     albuterol (2.5 MG/3ML) 0.083% nebulizer solution     bisacodyl (DULCOLAX) 10 MG suppository     polyethylene glycol (MIRALAX/GLYCOLAX) packet     senna-docusate (SENOKOT-S;PERICOLACE) 8.6-50 MG per tablet     omeprazole 20 MG tablet     potassium chloride SA (K-DUR,KLOR-CON M) 20 MEQ tablet     SIMETHICONE-80 PO     Ondansetron HCl (ZOFRAN PO)     multivitamin  with lutein (OCUVITE WITH LTEIN) CAPS     hypromellose (ARTIFICIAL TEARS) 0.5 % SOLN     levothyroxine (SYNTHROID, LEVOTHROID) 75 MCG tablet     ascorbic acid 500 MG TABS        Allergies   Allergen Reactions     Cortisone Other (See Comments)     Mental changes      Nuts Anaphylaxis     I have reviewed the Medications, Allergies, Past Medical and Surgical History, and Social History in the Epic system.    Review of Systems   Unable to perform ROS: Mental status change   Gastrointestinal: Positive for abdominal distention and abdominal pain.   All other systems reviewed and are negative.      Physical Exam      Physical Exam   Constitutional: She appears well-developed.   HENT:   Head: Normocephalic and atraumatic.   Mouth/Throat: Mucous membranes are dry.   Cardiovascular: An irregularly irregular rhythm present. Tachycardia present.    Pulmonary/Chest: Effort normal.   Abdominal: Soft. She exhibits distension. There is tenderness. There is no rebound and no guarding.   Neurological: She is alert. No cranial nerve deficit.   Oriented to person.  February for a month.  Accurately identifies her height.  Moves all extremities to command.  Formal muscle testing/pronator drift assessment not possible.   Nursing note and vitals reviewed.      ED Course     10:55 AM  The patient was seen and examined by Dr. Lovell in Room 1.     ED Course     Procedures            EKG Interpretation:       Interpreted by USAMA HELMS MD  Time reviewed: 1113  Symptoms at time of EKG: AMS   Rhythm: atrial fibrillation - rapid  Rate: 110  Axis: Normal  Ectopy: premature ventricular contractions (unifocal)  Conduction: normal  ST Segments/ T Waves: No acute ischemic changes  Q Waves: none  Comparison to prior: Unchanged    Clinical Impression: no acute changes    Results for orders placed or performed during the hospital encounter of 03/03/17 (from the past 24 hour(s))   CBC with platelets differential   Result Value Ref Range    WBC 11.9 (H) 4.0 - 11.0 10e9/L    RBC Count 4.66 3.8 - 5.2 10e12/L    Hemoglobin 14.7 11.7 - 15.7 g/dL    Hematocrit 45.5 35.0 - 47.0 %    MCV 98 78 - 100 fl    MCH 31.5 26.5 - 33.0 pg    MCHC 32.3 31.5 - 36.5 g/dL    RDW 14.6 10.0 - 15.0 %    Platelet Count 263 150 - 450 10e9/L    Diff Method Automated Method     % Neutrophils 88.5 %    % Lymphocytes 8.5 %    % Monocytes 2.5 %    % Eosinophils 0.0 %    % Basophils 0.1 %    % Immature Granulocytes 0.4 %    Nucleated RBCs 0 0 /100    Absolute Neutrophil 10.6 (H) 1.6 - 8.3 10e9/L    Absolute Lymphocytes 1.0 0.8 - 5.3 10e9/L    Absolute Monocytes 0.3 0.0 - 1.3 10e9/L    Absolute Eosinophils 0.0 0.0 - 0.7 10e9/L    Absolute Basophils 0.0 0.0 - 0.2 10e9/L    Abs Immature Granulocytes 0.1 0 - 0.4 10e9/L    Absolute Nucleated RBC 0.0    Comprehensive metabolic panel   Result Value Ref Range    Sodium 140 133 - 144 mmol/L    Potassium 5.0 3.4 - 5.3 mmol/L    Chloride 103 94 - 109 mmol/L    Carbon Dioxide 24 20 - 32 mmol/L    Anion Gap 12 3 - 14 mmol/L    Glucose 146 (H) 70 - 99 mg/dL    Urea Nitrogen 16 7 - 30 mg/dL    Creatinine 0.51 (L) 0.52 - 1.04 mg/dL    GFR Estimate >90  Non  GFR Calc   >60 mL/min/1.7m2    GFR Estimate If Black >90   GFR Calc   >60 mL/min/1.7m2    Calcium 8.4 (L) 8.5 - 10.1 mg/dL    Bilirubin Total 0.6 0.2 - 1.3 mg/dL    Albumin 3.1 (L) 3.4 - 5.0 g/dL    Protein Total 6.8 6.8 - 8.8 g/dL     Alkaline Phosphatase 88 40 - 150 U/L    ALT 7 0 - 50 U/L    AST  0 - 45 U/L     Unsatisfactory specimen - hemolyzed  NOTIFIED CARLOS KENYON @1220 03.03.17 [facility]. BY EK     Lipase   Result Value Ref Range    Lipase 55 (L) 73 - 393 U/L   Lactic acid   Result Value Ref Range    Lactic Acid 2.5 (H) 0.7 - 2.1 mmol/L   INR   Result Value Ref Range    INR 1.11 0.86 - 1.14   Ammonia (on ice)   Result Value Ref Range    Ammonia  10 - 50 umol/L     Unsatisfactory specimen - hemolyzed  NOTIFIED CARLOS KENYON @1220 03.03.17 [facility]. BY EK     Blood culture   Result Value Ref Range    Specimen Description Blood Right Arm     Culture Micro Pending     Micro Report Status Pending    Blood culture   Result Value Ref Range    Specimen Description Blood Left Arm     Culture Micro Pending     Micro Report Status Pending    Troponin I   Result Value Ref Range    Troponin I ES 0.757 (HH) 0.000 - 0.045 ug/L   Procalcitonin   Result Value Ref Range    Procalcitonin  ng/ml     <0.05  <0.05 ng/ml  Normal  Recommendation: Very low risk of bacterial infection.   Discourage antibiotics unless strong clinical suspicion for serious infection.     ISTAT gases lactate demarcus POCT   Result Value Ref Range    Ph Venous 7.48 (H) 7.32 - 7.43 pH    PCO2 Venous 33 (L) 40 - 50 mm Hg    PO2 Venous 53 (H) 25 - 47 mm Hg    Bicarbonate Venous 25 21 - 28 mmol/L    O2 Sat Venous 90 %    Lactic Acid 1.7 0.7 - 2.1 mmol/L   Creatinine POCT   Result Value Ref Range    Creatinine 0.5 (L) 0.52 - 1.04 mg/dL    GFR Estimate >90 >60 mL/min/1.7m2    GFR Estimate If Black >90 >60 mL/min/1.7m2   Head CT w/o contrast    Narrative    CT HEAD W/O CONTRAST 3/3/2017 11:50 AM    Provided History: Altered mental status    Comparison: CT head 8/30/2016 and 7/14/2016..    Technique: Using multidetector thin collimation helical acquisition  technique, axial, coronal and sagittal CT images from the skull base  to the vertex were obtained without intravenous contrast.     Findings:     No intracranial hemorrhage, mass effect, or midline shift. The gray to  white matter differentiation of the cerebral hemispheres is preserved.  The basal cisterns are patent. Moderate generalized cerebral and  cerebellar parenchymal volume loss. Ventricles are not enlarged out of  proportion to the cerebral sulci. Stable 13 mm complex cystic lesion  with layering debris in the right parotid gland (series 3 image 30),  unchanged dating back to at least 7/14/2016. Scattered foci of  intravenous gas of the skull base. Calvarium and visualized facial  bones of are intact.    There is polypoid mucosal thickening in the right sphenoid locule and  anterior ethmoid air cells.. The mastoid air cells are clear.       Impression    Impression:   1. No acute intracranial pathology. No significant change since  8/30/2016.  2. 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 Consider ultrasound, if clinically  indicated.    I have personally reviewed the examination and initial interpretation  and I agree with the findings.    CHERELLE VO MD   Chest CT, IV contrast only - PE protocol    Narrative    Examination:  CT CHEST PULMONARY EMBOLISM W CONTRAST 3/3/2017 12:01 PM      Comparison: 3/29/2015    History: Hypoxia    TECHNIQUE: Volumetric helical acquisition of CT images of the chest  from the lung apices to the kidneys were acquired in arterial phase  after the administration of IV contrast. Contrast dose: 123 cc  Isovue-370    FINDINGS:    Chest:    There is adequate opacification of the main and lobar pulmonary  arteries. No filling defect in the lobar and main segmental pulmonary  arteries to suggest acute pulmonary embolism. There is significant  improvement in filling defects within the right main pulmonary artery  and right lower lobe segmental pulmonary arteries. This study is  however compromised somewhat by motion artifact.    Cardiomegaly, similar to prior  there is evidence for right heart  dysfunction with dilated pulmonary artery to 4.1 cm, reflux of  contrast into the IVC and hepatic veins. No mediastinal, hilar or  axillary lymphadenopathy. Hiatal hernia.    Lungs and airway: Central tracheobronchial tree is clear. There is  diffuse septal thickening and gravitationally dependent groundglass  opacities. There are bilateral pleural effusions right greater than  left. No pneumothorax. No focal consolidative opacity.    Upper Abdomen: Evaluation of upper abdomen is limited. There are  extensive atherosclerotic calcifications of the abdominal aorta as  well as the splenic vessels.    Bones and soft tissues: Compression deformity of T5. Degenerative  changes of the spine. No aggressive osseous lesions. Diastases rectus.  Soft tissues otherwise unremarkable.      Impression    IMPRESSION:   1. No acute pulmonary embolism. 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  2. Evidence of heart failure with elevated right heart dysfunction,  dilated pulmonary artery and moderate pulmonary edema and new small  bilateral pleural effusions right greater than left.    I have personally reviewed the examination and initial interpretation  and I agree with the findings.    INDER BELLAMY MD   Abd/pelvis CT,  IV  contrast only TRAUMA / AAA   Result Value Ref Range    Radiologist flags Rectal stool with air distended colon (Urgent)     Narrative    EXAMINATION: CT ABDOMEN PELVIS W CONTRAST, 3/3/2017 12:56 PM    TECHNIQUE:  Helical CT images from the lung bases through the  symphysis pubis were obtained with IV contrast. Contrast dose:  iopamidol (ISOVUE-370) solution 123 mL    COMPARISON: CT of the abdomen and pelvis on 2/24/2015. Chest CT on  3/3/2017.    HISTORY: Abdominal pain and distention    FINDINGS:    Abdomen/pelvis:   There are numerous redundant loops of colon. Several of these are  severely distended measuring  up to 8.7 cm in diameter in the mid  abdomen. There is large volume of stool in the rectum with air stool  level. Mild circumferential thickening of the distal rectum, more  pronounced posteriorly. No defined transition point is appreciated. No  wall thickening, pneumatosis, or portal venous gas. No proximal small  bowel dilatation. No pericolonic inflammation. The appendix is not  identified. There are no secondary signs of appendicitis. No  extraluminal air or free fluid. Large sliding-type hiatal hernia  containing the majority of the stomach.     Evaluation of the upper abdomen is limited by motion artifact.  Examination is further limited by early contrast bolus with exam in  the late arterial phase. Marked common bile duct dilatation measuring  up to 2.3 cm in diameters not significantly changed since 2/24/2015.  Redemonstration of punctate pneumobilia adjacent to the ampulla of  Vater. There is mild irregular central intrahepatic biliary duct  dilatation, decreased from prior. The gallbladder is within normal  limits. No hepatic mass or abnormal enhancement. Poor opacification of  the portal vein, likely secondary to technique.     Simple cysts in the kidneys bilaterally, as well as subcentimeter  hypodensities that are too small to characterize but also likely  cysts. No hydronephrosis or hydroureter. Normal bilateral nephrograms.  The pancreas, spleen, and adrenal glands are unremarkable. No  suspicious adenopathy.    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 The bladder is minimally distended and  otherwise unremarkable. Irregular calcification in the uterine fundus  likely representing fibroids.    Bones/soft tissues: Diffuse mild fatty atrophy of the musculature.  Diastases of the rectus abdominis muscles. Marked  convex right  scoliosis of the spine centered at L1. Diffuse severe osteopenia.  Moderate to severe multilevel degenerative changes of the spine.  Lucency with coarsened trabeculae in the L2 vertebral body likely  representing a benign hemangioma.    Lung bases:  Small right and trace left pleural effusions. Enlarged  right atrium with partially visualized pacemaker leads.      Impression    IMPRESSION:   1. Severe air distended loops of colon with large amount of stool in  the rectum may represent early distal colonic obstruction, or less  likely colonic ileus (Alcester's syndrome). No findings suggestive of  ischemia.  Non dilated small bowel loops.   a. Circumferential thickening of the rectum, could represent colitis.  Recommend clinical correlation and possible additional evaluation of  rectum.   2. Unchanged severe common bile duct dilatation with mildly improved  intrahepatic biliary duct dilatation. Possible trace amount of  pneumobilia near the ampulla of Vater. Cannot exclude an obstructing  biliary or pancreatic head mass. This could be further evaluated with  MRCP or ERCP.   3. 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  4. Large sliding-type hiatal hernia.  5. Please see CT pulmonary angiogram from the same day for details  regarding the chest.     [Urgent Result: Rectal stool with air distended colon]    Finding was identified on 3/3/2017 12:56 PM.     Dr. Lovell was contacted by Dr. Cee at 3/3/2017 1:20 PM and  verbalized understanding of the urgent finding.     I have personally reviewed the examination and initial interpretation  and I agree with the findings.    RAH BLOOM MD   Ammonia (on ice)   Result Value Ref Range    Ammonia 19 10 - 50 umol/L   AST   Result Value Ref Range    AST 13 0 - 45 U/L   Potassium   Result Value Ref Range    Potassium 4.1 3.4 - 5.3 mmol/L   Nt  probnp inpatient   Result Value Ref Range    N-Terminal Pro BNP Inpatient 94929 (H) 0 - 1800 pg/mL   UA with Microscopic reflex to Culture   Result Value Ref Range    Color Urine Yellow     Appearance Urine Clear     Glucose Urine Negative NEG mg/dL    Bilirubin Urine Negative NEG    Ketones Urine 5 (A) NEG mg/dL    Specific Gravity Urine >1.050 (H) 1.003 - 1.035    Blood Urine Negative NEG    pH Urine 5.5 5.0 - 7.0 pH    Protein Albumin Urine 100 (A) NEG mg/dL    Urobilinogen mg/dL Normal 0.0 - 2.0 mg/dL    Nitrite Urine Positive (A) NEG    Leukocyte Esterase Urine Trace (A) NEG    Source Catheterized Urine     WBC Urine 5 (H) 0 - 2 /HPF    RBC Urine 2 0 - 2 /HPF    Bacteria Urine Few (A) NEG /HPF    Mucous Urine Present (A) NEG /LPF   Urine Culture Aerobic Bacterial   Result Value Ref Range    Specimen Description Catheterized Urine     Special Requests Specimen received in preservative     Culture Micro Pending     Micro Report Status Pending    GI LUMINAL ADULT IP CONSULT: Patient to be seen: Routine within 24 hrs; Call back #: [id number]; Abdominal pain and colonic distention; Consultant may enter orders: Yes    Amabr Livingston, RONEN LUBIN     3/3/2017  5:02 PM      GASTROENTEROLOGY CONSULTATION      Date of Admission:  3/3/2017          Chief Complaint:   We were asked by Mannie Lovell to evaluate this patient with   abdominal pain and distended bowels seen on CT.            ASSESSMENT AND RECOMMENDATIONS:   Assessment:  Ankita Pham is a 89 year old female with a history of PE on   325mg Aspirin, A. Fib with ventricular pacemaker, sick sinus   syndrome, pulmonary hypertension, epidural hematoma,   hypothyroidism, constipation, HTN, lymphedema, chronic PUD and   incontinence who come in with altered mental status, abdominal   pain and distension. Patient is afebrile. On admission potassium   was 5.0, and WBC was 11.9.     Abdominal/pelvis CT on 3/3/17 showed severely distended loops of   colon  with large amount of stool in the rectum and since there   was no rectal mass noted with rectal exam, patient might had   constipation first that resulted pseudo-obstruction. There was   unchanged severe common bile duct dilatation with mildly improved   intrahepatic biliary duct dilatation when compared to the CT scan   completed on 2/24/15.  Plan is conservative management and   monitor patient closely, if no improvement in next 24-48 hours,   we will consider evaluating patient with flex sigmoidoscopy.      Recommendations  - BMP, CBC, Lactate  - Stabilizing electrolytes (potassium around 4.0, Mg 2.0 and   calcium 8-10)  - C. diff stool testing, if positive please treat with 125mg of   oral Vancomycin QID x14 days  - Serial abdominal exams  - NPO, MIVF  - Stat NG tube placement for decompression - to intermittent   suction on wall   - Red rubber rectal tube for decompression  - Rotate patient in bed frequently as patient has impaired   mobility  - Avoid all anticholinergics and narcotics as able, at the very   least minimize.  - We will consider enema tomorrow  - Could consider abdominal ultrasound to evaluate biliary   dilation although her LFTs are normal    Gastroenterology follow up recommendations: To be determined    Patient care plan discussed with Dr. Clayton, GI staff physician.   Thank you for involving us in this patient's care. Please do not   hesitate to contact the GI service with any questions or   concerns.     Ambar Garcia CNP  Department of Gastroenterology   ------------------------------------------------------------------  -------------------------------------------------   History is obtained from the patient and the medical record.          History of Present Illness:   Ankita Pham is a 89 year old female with a history of PE on   325mg Aspirin, A. Fib with ventricular pacemaker, sick sinus   syndrome, pulmonary hypertension, epidural hematoma,   hypothyroidism, constipation, HTN,  lymphedema, chronic PUD and   incontinence who come in with abdominal pain and distension.   Patient is afebrile. On admission potassium was 5.0, and WBC was   11.9.  Abdominal/pelvis CT on 3/3/17 showed severely distended   loops of colon with large amount of stool in the rectum and since   there was no rectal mass noted with rectal exam, patient might   had constipation first that resulted pseudo-obstruction.  There   was unchanged severe common bile duct dilatation with mildly   improved intrahepatic biliary duct dilatation when compared to   the CT scan completed on 2/24/15.    Patient living in care center facility and she does have impaired   mobility. She has a recurrent constipation, which she receives   oral bowel regimen for it. She did have small stool today.        Past Medical History:   Reviewed and edited as appropriate  Past Medical History   Diagnosis Date     Acute, but ill-defined, cerebrovascular disease 1975     incontinent     Cardiac dysrhythmia, unspecified      sees Dr. Marcus     Cardiac pacemaker in situ 1999     Chronic peptic ulcer, unspecified site, without mention of   hemorrhage, perforation, or obstruction      Diaphragmatic hernia without mention of obstruction or gangrene        Edema      Esophageal reflux 12/13/2006     Essential hypertension, benign      Heart disease, unspecified      Irritable bowel syndrome      Lymphedema      Myalgia and myositis, unspecified      Open wound of heel 3/31/2014     Right heel.     Osteoarthrosis, unspecified whether generalized or localized,   unspecified site      Dr. Ashly Palacios     Other abnormal glucose 2005     Other chronic pulmonary heart diseases      Rotator cuff (capsule) sprain      left, Dr. Paredes didn't think surgery worth risks     Unspecified hypothyroidism      Unspecified hypothyroidism      Unspecified sleep apnea      Urinary incontinence 8/1/2014            Past Surgical History:   Reviewed and edited as appropriate   Past  Surgical History   Procedure Laterality Date     Surgical history of -        Ankle fracture Tib     Surgical history of -        TIA'a     Surgical history of -        Thyroiditis x3 remote     Surgical history of -        Ulnar nerve transpostion     Surgical history of -   1999     Pacemaker (sinus phases, bradycardia)     Surgical history of -        L atrial pacer     Surgical history of -        appy     Surgical history of -        Benign breast biopsy     Endoscopic release carpal tunnel  10/26/2011     Procedure:ENDOSCOPIC RELEASE CARPAL TUNNEL; RIGHT ENDOSCOPIC   CARPAL TUNNEL RELEASE, RIGHT LONG AND RING A-1 JAMIL RELEASE;   Surgeon:KATE MARRERO; Location: [facility]     Release trigger finger  10/26/2011     Procedure:RELEASE TRIGGER FINGER; Surgeon:KATE MARRERO; Location: [facility]     Incision and drainage sacral wound, combined N/A 8/28/2014     Procedure: COMBINED INCISION AND DRAINAGE SACRAL WOUND;    Surgeon: Osorio Morgan MD;  Location: [facility]            Previous Endoscopy:   No results found for this or any previous visit.         Social History:   Reviewed and edited as appropriate  Social History     Social History     Marital status:      Spouse name: N/A     Number of children: N/A     Years of education: N/A     Occupational History     Not on file.     Social History Main Topics     Smoking status: Former Smoker     Years: 10.00     Quit date: 5/1/1966     Smokeless tobacco: Never Used     Alcohol use 28.0 oz/week      Comment: 4 oz of wine  each evening     Drug use: No     Sexual activity: Not Currently     Partners: Male     Other Topics Concern     Parent/Sibling W/ Cabg, Mi Or Angioplasty Before 65f 55m? No     Social History Narrative    Daughter in law does grocery shopping    Son helps her around the home if needed.    One daughter in [place], [place], [place] - one daughter has passed away.            Family History:   Reviewed and edited as  appropriate  Family History   Problem Relation Age of Onset     Family History Negative Mother      HEART DISEASE Father      HEART DISEASE Sister      CHF     HEART DISEASE Brother      multiple MI     Respiratory Brother      COPD     HEART DISEASE Brother      MI     HEART DISEASE Brother       during open heart surgery     DIABETES No family hx of      Hypertension No family hx of            Allergies:   Reviewed and edited as appropriate     Allergies   Allergen Reactions     Cortisone Other (See Comments)     Mental changes      Nuts Anaphylaxis            Medications:     Current Facility-Administered Medications   Medication     0.9% sodium chloride infusion     Current Outpatient Prescriptions   Medication Sig     OXYCODONE HCL PO Take 5 mg by mouth 3 times daily     METOPROLOL SUCCINATE ER PO Take 12.5 mg by mouth At Bedtime     SIMETHICONE-80 PO Take 80 mg by mouth 2 times daily as needed   for intestinal gas in addition to scheduled doses     oxyCODONE (ROXICODONE) 5 MG immediate release tablet Take 1-2   tablets (5-10 mg) by mouth every 4 hours as needed for moderate   to severe pain     aspirin  MG tablet Take 325 mg by mouth daily      Gabapentin (NEURONTIN PO) Take 200 mg by mouth daily     Citalopram Hydrobromide (CELEXA PO) Take 10 mg by mouth daily     lactulose (CHRONULAC) 10 GM/15ML solution Take 30 mLs by mouth   daily as needed for constipation     LORAZEPAM PO Take 0.25 mg by mouth daily as needed for anxiety     acetaminophen (TYLENOL) 500 MG tablet Take 2 tablets (1,000 mg)   by mouth 3 times daily     magnesium oxide (MAG-OX) 400 MG tablet Take 1 tablet (400 mg)   by mouth daily     albuterol (2.5 MG/3ML) 0.083% nebulizer solution Take 1 vial   (2.5 mg) by nebulization every 6 hours as needed for shortness of   breath / dyspnea or wheezing     bisacodyl (DULCOLAX) 10 MG suppository Place 1 suppository (10   mg) rectally daily as needed for constipation     polyethylene glycol  "(MIRALAX/GLYCOLAX) packet Take 17 g by   mouth 2 times daily     senna-docusate (SENOKOT-S;PERICOLACE) 8.6-50 MG per tablet Take   2 tablets by mouth 2 times daily     omeprazole 20 MG tablet Take 1 tablet (20 mg) by mouth 2 times   daily     potassium chloride SA (K-DUR,KLOR-CON M) 20 MEQ tablet Take 1   tablet (20 mEq) by mouth daily     SIMETHICONE-80 PO Take 80 mg by mouth 3 times daily     Ondansetron HCl (ZOFRAN PO) Take 4 mg by mouth every 6 hours as   needed for nausea or vomiting     multivitamin  with lutein (OCUVITE WITH LTEIN) CAPS Take 1   capsule by mouth daily     hypromellose (ARTIFICIAL TEARS) 0.5 % SOLN Place 1 drop into   both eyes 3 times daily     levothyroxine (SYNTHROID, LEVOTHROID) 75 MCG tablet Take 1   tablet (75 mcg) by mouth daily     ascorbic acid 500 MG TABS Take 1 tablet (500 mg) by mouth daily               Review of Systems:   A complete review of systems was performed and is negative except   as noted in the HPI           Physical Exam:   BP (!) 124/99  Pulse 115  Temp 97.6  F (36.4  C) (Axillary)  Resp   9  Ht 1.651 m (5' 5\")  Wt 91.3 kg (201 lb 3.2 oz)  SpO2 95%    Breastfeeding? No  BMI 33.48 kg/m2  Wt:   Wt Readings from Last 2 Encounters:   03/03/17 91.3 kg (201 lb 3.2 oz)   07/15/16 84.3 kg (185 lb 13.6 oz)      Constitutional: Altered mental status. Patient moans with   abdominal discomfort but not dyspneic/diaphoretic, no acute   distress  Eyes: Sclera anicteric/injected  Ears/nose/mouth/throat: Normal oropharynx without ulcers or   exudate, mucus membranes moist, hearing intact  Neck: supple, thyroid normal size  CV: RRR. Lymphedema in LE  Respiratory: Unlabored breathing. Diminished lung sound   bilaterally  Lymph: No axillary, submandibular, supraclavicular or inguinal   lymphadenopathy  Abd: Nondistended, +bs, no hepatosplenomegaly, no peritoneal   signs. Generalized tenderness.   Skin: warm, perfused, no jaundice  Neuro: AAO x 3, No asterixis  Psych: Normal affect  MSK: " Normal gait         Data:   Labs and imaging below were independently reviewed and   interpreted    BMP  Recent Labs  Lab 17  1248 17  1103   NA  --  140   POTASSIUM 4.1 5.0   CHLORIDE  --  103   OMERO  --  8.4*   CO2  --  24   BUN  --  16   CR  --  0.51*   GLC  --  146*     CBC  Recent Labs  Lab 17  1103   WBC 11.9*   RBC 4.66   HGB 14.7   HCT 45.5   MCV 98   MCH 31.5   MCHC 32.3   RDW 14.6        INR  Recent Labs  Lab 17  1103   INR 1.11     LFTs  Recent Labs  Lab 17  1248 17  1103   ALKPHOS  --  88   AST 13 Unsatisfactory specimen - hemolyzedNOTIFIED CARLOS KENYON @[facility] 17 [facility]. BY EK   ALT  --  7   BILITOTAL  --  0.6   PROTTOTAL  --  6.8   ALBUMIN  --  3.1*      PANC  Recent Labs  Lab 17  1103   LIPASE 55*        Troponin I   Result Value Ref Range    Troponin I ES 0.962 () 0.000 - 0.045 ug/L   Echo Complete with Lumason    Narrative    [id number]  [id number]  [id number]  [id number]^SCOOTER^USAMA^           [facility],[place]  Echocardiography Laboratory  [address] [place] [id number]     Name: COTY VELASCO  MRN: [id number]  : 1921  Study Date: 2017 03:19 PM  Age: 89 yrs  Gender: Female  Patient Location: [facility]  Reason For Study: Heart Failure  Ordering Physician: USAMA HELMS  Performed By: Constantino Roberts RDCS     BSA: 2.0 m2  Height: 65 in  Weight: 201 lb  BP: 129/84 mmHg  _____________________________________________________________________________  __        Procedure  Complete Portable Echo Adult. Lumason (NDC #3569-9657-47) given intravenously.  Patient was given 5ml mixture of Lumason. 0 ml wasted.  _____________________________________________________________________________  __        Interpretation Summary  Mild left ventricular dilation is present.  The Ejection Fraction is estimated at 15-20%.  Right ventricular function, chamber size, wall motion, and thickness are  normal.  Dilation of the inferior  vena cava is present with abnormal respiratory  variation in diameter.  No pericardial effusion is present.  _____________________________________________________________________________  __        Left Ventricle  Mild left ventricular dilation is present. The Ejection Fraction is estimated  at 15-20%. Left ventricular diastolic function is indeterminate. Severe  diffuse hypokinesis is present.     Right Ventricle  Right ventricular function, chamber size, wall motion, and thickness are  normal. A pacemaker lead is noted in the right ventricle.     Atria  Severe biatrial enlargement is present.        Mitral Valve  Mild mitral annular calcification is present. Mild mitral insufficiency is  present.     Aortic Valve  Mild aortic valve sclerosis is present.     Tricuspid Valve  Moderate tricuspid insufficiency is present. The right ventricular systolic  pressure is approximated at 19.9 mmHg plus the right atrial pressure.     Pulmonic Valve  The pulmonic valve is normal. Trace pulmonic insufficiency is present.     Vessels  The aorta root is normal. The pulmonary artery is normal. Ascending aorta 3.8  cm. Dilation of the inferior vena cava is present with abnormal respiratory  variation in diameter.     Pericardium  No pericardial effusion is present.     _____________________________________________________________________________  __  MMode/2D Measurements & Calculations  IVSd: 1.0 cm  LVIDd: 6.3 cm  LVIDs: 6.1 cm  LVPWd: 1.4 cm     FS: 2.4 %  EDV(Teich): 200.5 ml  ESV(Teich): 189.7 ml  LV mass(C)d: 333.9 grams  asc Aorta Diam: 3.8 cm  LVOT diam: 2.2 cm  LVOT area: 3.8 cm2  LA Volume (BP): 224.0 ml  LA Volume Index (BP): 113.1 ml/m2        Doppler Measurements & Calculations  MV E max gloria: 70.8 cm/sec  TR max gloria: 221.8 cm/sec  TR max P.9 mmHg     Lateral E/e': 9.2  Medial E/e': 11.3           _____________________________________________________________________________  __           Report approved by: AUTUMN  "Akanksha Fuentes 03/03/2017 04:12 PM           4:55 PM Patient more awake. Denies abdominal pain.  States \"hospital\" when asked where she is.    Medications   lisinopril (PRINIVIL/Zestril) tablet 2.5 mg (not administered)   0.9% sodium chloride BOLUS (0 mLs Intravenous Stopped 3/3/17 1332)   sodium chloride (PF) 0.9% PF flush 90 mL (90 mLs Intravenous Given 3/3/17 1129)   iopamidol (ISOVUE-370) solution 123 mL (123 mLs Intravenous Given 3/3/17 1129)   cefTRIAXone (ROCEPHIN) 1 g vial to attach to  mL bag for ADULTS or NS 50 mL bag for PEDS (0 g Intravenous Stopped 3/3/17 1605)   sulfur hexafluoride microsphere (LUMASON) 60.7-25 MG injection 5 mL (5 mLs Intravenous Given 3/3/17 1556)   furosemide (LASIX) injection 40 mg (40 mg Intravenous Given 3/3/17 1802)        Critical Care time:    The following services were consulted and saw the patient in the ED:  CSI- recommended echocardiogram, troponin trend, and Lasix.  Colorectal surgery- no surgical intervention.  Luminal GI- NG tube decompression and frequent repositioning.  Patient did not tolerate NG tube placement     Discussed with Stroke fellow- patient has pacemaker so no MRI.  Can repeat CT tomorrow if indicated.  Low clinical suspicion for stroke.    Assessments & Plan (with Medical Decision Making)   89 year old female to the emergency department with altered mental status and abdominal distention.  Extensive evaluation is remarkable for mild leukocytosis and mild lactate elevation.  She has distended colon on her abdominal CT but no other abnormality to account for her abdominal pain.  Chest CT does not reveal pulmonary embolus but does have evidence for heart failure.  The patient does have a mild troponin elevation which increased slightly on for recheck.  She also has an elevated BNP.  CSI has seen the patient and felt that these findings were related to an acute stressor and not an acute cardiac event.  She does not have EKG changes.  She has a history of " atrial fibrillation.  Colorectal surgery and luminal GI saw the patient for her abdominal distention and air filled loops of colon on CT.  An NG tube was recommended but patient did not tolerate placement patient did pass a significant amount of gas in the emergency department after a bowel movement and subsequently denied ongoing abdominal pain.  She is more alert and offers brief conversation at this point.  The patient's urinalysis appears infected on catheterized specimen so ceftriaxone was given.  The patient has a low Procrit also total level so do not suspect sepsis.  The patient will be admitted to the internal medicine service for further evaluation and management.    I have reviewed the nursing notes.    I have reviewed the findings, diagnosis, plan and need for follow up with the patient.    New Prescriptions    No medications on file       Final diagnoses:   Delirium   Colon distention     IGracy, am serving as a trained medical scribe to document services personally performed by Mannie Lovell MD, based on the provider's statements to me.      Mannie MELCHOR MD, was physically present and have reviewed and verified the accuracy of this note documented by Gracy Hernandez.     3/3/2017   [facility], EMERGENCY DEPARTMENT     Mannie Lovell MD  03/03/17 1631

## 2017-03-04 ENCOUNTER — APPOINTMENT (OUTPATIENT)
Dept: GENERAL RADIOLOGY | Facility: CLINIC | Age: 82
DRG: 388 | End: 2017-03-04
Attending: INTERNAL MEDICINE
Payer: COMMERCIAL

## 2017-03-04 LAB
ALBUMIN SERPL-MCNC: 2.9 G/DL (ref 3.4–5)
ALBUMIN SERPL-MCNC: 3 G/DL (ref 3.4–5)
ALP SERPL-CCNC: 75 U/L (ref 40–150)
ALP SERPL-CCNC: 76 U/L (ref 40–150)
ALT SERPL W P-5'-P-CCNC: 11 U/L (ref 0–50)
ALT SERPL W P-5'-P-CCNC: 8 U/L (ref 0–50)
ANION GAP SERPL CALCULATED.3IONS-SCNC: 12 MMOL/L (ref 3–14)
ANION GAP SERPL CALCULATED.3IONS-SCNC: 7 MMOL/L (ref 3–14)
AST SERPL W P-5'-P-CCNC: 14 U/L (ref 0–45)
AST SERPL W P-5'-P-CCNC: 14 U/L (ref 0–45)
BILIRUB SERPL-MCNC: 0.4 MG/DL (ref 0.2–1.3)
BILIRUB SERPL-MCNC: 0.8 MG/DL (ref 0.2–1.3)
BUN SERPL-MCNC: 20 MG/DL (ref 7–30)
BUN SERPL-MCNC: 21 MG/DL (ref 7–30)
CALCIUM SERPL-MCNC: 8.4 MG/DL (ref 8.5–10.1)
CALCIUM SERPL-MCNC: 8.7 MG/DL (ref 8.5–10.1)
CHLORIDE SERPL-SCNC: 102 MMOL/L (ref 94–109)
CHLORIDE SERPL-SCNC: 102 MMOL/L (ref 94–109)
CO2 SERPL-SCNC: 30 MMOL/L (ref 20–32)
CO2 SERPL-SCNC: 31 MMOL/L (ref 20–32)
CREAT SERPL-MCNC: 0.59 MG/DL (ref 0.52–1.04)
CREAT SERPL-MCNC: 0.69 MG/DL (ref 0.52–1.04)
ERYTHROCYTE [DISTWIDTH] IN BLOOD BY AUTOMATED COUNT: 14.4 % (ref 10–15)
GFR SERPL CREATININE-BSD FRML MDRD: 79 ML/MIN/1.7M2
GFR SERPL CREATININE-BSD FRML MDRD: ABNORMAL ML/MIN/1.7M2
GLUCOSE BLDC GLUCOMTR-MCNC: 113 MG/DL (ref 70–99)
GLUCOSE SERPL-MCNC: 124 MG/DL (ref 70–99)
GLUCOSE SERPL-MCNC: 87 MG/DL (ref 70–99)
HCT VFR BLD AUTO: 42.6 % (ref 35–47)
HGB BLD-MCNC: 13.5 G/DL (ref 11.7–15.7)
INR PPP: 1.07 (ref 0.86–1.14)
LACTATE BLD-SCNC: 1.1 MMOL/L (ref 0.7–2.1)
MAGNESIUM SERPL-MCNC: 1.8 MG/DL (ref 1.6–2.3)
MCH RBC QN AUTO: 31.2 PG (ref 26.5–33)
MCHC RBC AUTO-ENTMCNC: 31.7 G/DL (ref 31.5–36.5)
MCV RBC AUTO: 98 FL (ref 78–100)
PLATELET # BLD AUTO: 209 10E9/L (ref 150–450)
POTASSIUM SERPL-SCNC: 3.4 MMOL/L (ref 3.4–5.3)
POTASSIUM SERPL-SCNC: 3.8 MMOL/L (ref 3.4–5.3)
PROT SERPL-MCNC: 6.3 G/DL (ref 6.8–8.8)
PROT SERPL-MCNC: 6.3 G/DL (ref 6.8–8.8)
RBC # BLD AUTO: 4.33 10E12/L (ref 3.8–5.2)
SODIUM SERPL-SCNC: 140 MMOL/L (ref 133–144)
SODIUM SERPL-SCNC: 144 MMOL/L (ref 133–144)
WBC # BLD AUTO: 8.9 10E9/L (ref 4–11)

## 2017-03-04 PROCEDURE — 12000006 ZZH R&B IMCU INTERMEDIATE UMMC

## 2017-03-04 PROCEDURE — 40000556 ZZH STATISTIC PERIPHERAL IV START W US GUIDANCE

## 2017-03-04 PROCEDURE — 27210995 ZZH RX 272

## 2017-03-04 PROCEDURE — 83605 ASSAY OF LACTIC ACID: CPT | Performed by: INTERNAL MEDICINE

## 2017-03-04 PROCEDURE — 25000128 H RX IP 250 OP 636: Performed by: PHYSICIAN ASSISTANT

## 2017-03-04 PROCEDURE — 40000895 ZZH STATISTIC SLP IP EVAL DEFER

## 2017-03-04 PROCEDURE — 25000125 ZZHC RX 250: Performed by: PHYSICIAN ASSISTANT

## 2017-03-04 PROCEDURE — 87389 HIV-1 AG W/HIV-1&-2 AB AG IA: CPT | Performed by: PHYSICIAN ASSISTANT

## 2017-03-04 PROCEDURE — 85027 COMPLETE CBC AUTOMATED: CPT | Performed by: PHYSICIAN ASSISTANT

## 2017-03-04 PROCEDURE — 36415 COLL VENOUS BLD VENIPUNCTURE: CPT | Performed by: INTERNAL MEDICINE

## 2017-03-04 PROCEDURE — 25000125 ZZHC RX 250: Performed by: INTERNAL MEDICINE

## 2017-03-04 PROCEDURE — 25000132 ZZH RX MED GY IP 250 OP 250 PS 637: Performed by: PHYSICIAN ASSISTANT

## 2017-03-04 PROCEDURE — 99232 SBSQ HOSP IP/OBS MODERATE 35: CPT | Mod: GC | Performed by: INTERNAL MEDICINE

## 2017-03-04 PROCEDURE — 74000 XR ABDOMEN 1 VW: CPT

## 2017-03-04 PROCEDURE — 25000128 H RX IP 250 OP 636: Performed by: INTERNAL MEDICINE

## 2017-03-04 PROCEDURE — 80053 COMPREHEN METABOLIC PANEL: CPT | Performed by: INTERNAL MEDICINE

## 2017-03-04 PROCEDURE — 00000146 ZZHCL STATISTIC GLUCOSE BY METER IP

## 2017-03-04 PROCEDURE — 85610 PROTHROMBIN TIME: CPT | Performed by: PHYSICIAN ASSISTANT

## 2017-03-04 PROCEDURE — 99233 SBSQ HOSP IP/OBS HIGH 50: CPT | Performed by: INTERNAL MEDICINE

## 2017-03-04 PROCEDURE — 80053 COMPREHEN METABOLIC PANEL: CPT | Performed by: PHYSICIAN ASSISTANT

## 2017-03-04 PROCEDURE — 36415 COLL VENOUS BLD VENIPUNCTURE: CPT | Performed by: PHYSICIAN ASSISTANT

## 2017-03-04 PROCEDURE — 83735 ASSAY OF MAGNESIUM: CPT | Performed by: PHYSICIAN ASSISTANT

## 2017-03-04 RX ORDER — FUROSEMIDE 10 MG/ML
40 INJECTION INTRAMUSCULAR; INTRAVENOUS ONCE
Status: COMPLETED | OUTPATIENT
Start: 2017-03-04 | End: 2017-03-04

## 2017-03-04 RX ADMIN — FUROSEMIDE 40 MG: 10 INJECTION, SOLUTION INTRAVENOUS at 10:59

## 2017-03-04 RX ADMIN — POTASSIUM CHLORIDE 10 MEQ: 14.9 INJECTION, SOLUTION, CONCENTRATE PARENTERAL at 20:52

## 2017-03-04 RX ADMIN — Medication 2 G: at 13:31

## 2017-03-04 RX ADMIN — METOPROLOL TARTRATE 2.5 MG: 5 INJECTION INTRAVENOUS at 17:33

## 2017-03-04 RX ADMIN — ONDANSETRON 4 MG: 2 INJECTION INTRAMUSCULAR; INTRAVENOUS at 08:31

## 2017-03-04 RX ADMIN — POTASSIUM CHLORIDE 10 MEQ: 14.9 INJECTION, SOLUTION, CONCENTRATE PARENTERAL at 22:09

## 2017-03-04 RX ADMIN — LISINOPRIL 2.5 MG: 2.5 TABLET ORAL at 13:31

## 2017-03-04 RX ADMIN — ONDANSETRON 4 MG: 2 INJECTION INTRAMUSCULAR; INTRAVENOUS at 05:21

## 2017-03-04 RX ADMIN — POTASSIUM CHLORIDE 10 MEQ: 14.9 INJECTION, SOLUTION, CONCENTRATE PARENTERAL at 13:31

## 2017-03-04 ASSESSMENT — ACTIVITIES OF DAILY LIVING (ADL)
DRESS: 3-->ASSISTIVE EQUIPMENT AND PERSON
SWALLOWING: 0-->SWALLOWS FOODS/LIQUIDS WITHOUT DIFFICULTY
TOILETING: 3-->ASSISTIVE EQUIPMENT AND PERSON
BATHING: 4-->COMPLETELY DEPENDENT
TRANSFERRING: 3-->ASSISTIVE EQUIPMENT AND PERSON
RETIRED_COMMUNICATION: 0-->UNDERSTANDS/COMMUNICATES WITHOUT DIFFICULTY
AMBULATION: 4-->COMPLETELY DEPENDENT
FALL_HISTORY_WITHIN_LAST_SIX_MONTHS: NO
COGNITION: 0 - NO COGNITION ISSUES REPORTED
RETIRED_EATING: 3-->ASSISTIVE EQUIPMENT AND PERSON

## 2017-03-04 ASSESSMENT — VISUAL ACUITY
OU: BASELINE
OU: BASELINE

## 2017-03-04 NOTE — PLAN OF CARE
Problem: Goal Outcome Summary  Goal: Goal Outcome Summary  Outcome: No Change  Neuro: Pt is lethargic, but increasing becoming more alert. Orientated to self and at time location ([facility]).   Cardiac: Pt remains in afib. HR . SBP stable.             Respiratory: Sating>90% on 3L NC.   GI/: Adequate urine output. Decreasing throughout shift, MD made aware.   Diet/appetite: NPO. Oral cares provided.   Activity:  Assist of 2. Frequent repositioning.  Pain: Complaints of back pain. No pain meds at this time per MD.   Skin: Indentation on coccyx, WOC consult place.      Rectal tube placed per MD for colon decompression. Pt had NG in ER but did not tolerate, per MD no NG at this point. Pt abdomen becoming slightly more distended, pt having increased burping, and nausea with palpation of stomach. MD notified. Zofran given x1. Abdomen xray ordered. PVCs becoming more frequent. Labs drawn, awaiting results. Lactic acid drawn 1.1, down trending. Serial troponin stopped, last 0.940. Will continue to monitor.      R: Continue with POC. Notify primary team with changes.

## 2017-03-04 NOTE — PROVIDER NOTIFICATION
Pt having more frequent PVCs, per MD have morning lab draw now. Pt abdomen looking slightly more distended with pt complaining of nausea with palpation. MD made aware.

## 2017-03-04 NOTE — PLAN OF CARE
Problem: Goal Outcome Summary  Goal: Goal Outcome Summary  ST 6B: Deferral- chart reviewed. Spoke with RN and MD. Pt okayed for clear liquids from GI perspective. Tolerating sips of thin liquids with RN without difficulty. Formal swallow evaluation not indicated at this time. Will sign off. Please re-consult should concerns with swallow function arise.

## 2017-03-04 NOTE — PROGRESS NOTES
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 Fib with ventricular pacemaker, sick sinus syndrome, pulmonary hypertension, epidural hematoma, hypothyroidism, constipation, HTN, lymphedema, chronic PUD and incontinence who come in with altered mental status, abdominal pain and distension. Patient is afebrile. On admission potassium was 5.0, and WBC was 11.9.      Abdominal/pelvis CT on 3/3/17 showed severely distended loops of colon with large amount of stool in the rectum and since there was no rectal mass noted with rectal exam, patient might have had constipation, that resulted pseudo-obstruction. There was unchanged severe common bile duct dilatation with mildly improved intrahepatic biliary duct dilatation when compared to the CT scan completed on 2/24/15, but pt's symptoms seem to be associated with colonic distension and LFTs are wnl.    Pt has improved with conservative management and manual decompression yesterday. She has persistent but decreased abdominal distension, with soft abdomen.  Note plan to place rectal tube today. Low threshold for placement of NG tube. C diff negative.       Recommendations    - Encourage mobility, frequent turning in bed or up to chair, as able  - Continue to trend electrolytes and treat as needed (goals: potassium 4.0, Mg 2.0 and calcium 8-10)  - Serial abdominal exams  - NPO, MIVF  - Avoid all anticholinergics and narcotics as able, at the very least minimize.    Gastroenterology follow up recommendations: Will continue to follow    Thank you for involving us in this patient's care. Please do not hesitate to contact the GI service with any questions or concerns.     Pt care plan discussed with Dr. Clayton, GI staff physician.    Jeanine Elizondo MD  GI  "Fellow  [phone]    -------------------------------------------------------------------------------------------------------------------         Interval Events:     Pt reports improvement in abdominal pain, nausea and vomiting.  She has not had any additional bowel movements, but she has been passing gas. Again discussed possibility of NG tube if things do not continue to improve, but pt is refusing this option.         Physical Exam:   /66 (BP Location: Left arm)  Pulse 98  Temp 98  F (36.7  C) (Oral)  Resp 20  Ht 1.651 m (5' 5\")  Wt 85.3 kg (188 lb)  SpO2 97%  Breastfeeding? No  BMI 31.28 kg/m2  Wt:   Wt Readings from Last 2 Encounters:   03/04/17 85.3 kg (188 lb)   07/15/16 84.3 kg (185 lb 13.6 oz)        General: Lying in bed in NAD  HEENT: EOMI, MMM, anicteric sclera  Cardiac: Reg S1, S2 with no murmur  Pulm: CTAB, no crackles or wheezes  Abd: Soft, mildly distended, diffusely ttp, active BS  Extremities: LE edema b/l  Neuro: AAO           Data:   All available labs, imaging, and procedure notes were independently reviewed and interpreted, pertinent values are as follow:    BMP  Recent Labs  Lab 03/04/17  0606 03/03/17  1248 03/03/17  1103     --  140   POTASSIUM 3.8 4.1 5.0   CHLORIDE 102  --  103   OMERO 8.7  --  8.4*   CO2 31  --  24   BUN 20  --  16   CR 0.59  --  0.51*   *  --  146*     CBC  Recent Labs  Lab 03/04/17  0606 03/03/17  1103   WBC 8.9 11.9*   RBC 4.33 4.66   HGB 13.5 14.7   HCT 42.6 45.5   MCV 98 98   MCH 31.2 31.5   MCHC 31.7 32.3   RDW 14.4 14.6    263     INR  Recent Labs  Lab 03/04/17  0606 03/03/17  1103   INR 1.07 1.11     LFTs  Recent Labs  Lab 03/04/17  0606 03/03/17  1248 03/03/17  1103   ALKPHOS 75  --  88   AST 14 13 Unsatisfactory specimen - hemolyzedNOTIFIED CARLOS KENYON @1220 03.03.17 [facility]. BY EK   ALT 11  --  7   BILITOTAL 0.8  --  0.6   PROTTOTAL 6.3*  --  6.8   ALBUMIN 2.9*  --  3.1*      PANC  Recent Labs  Lab 03/03/17  1103   LIPASE 55*       "

## 2017-03-04 NOTE — PROGRESS NOTES
[facility], [place]   Internal Medicine Daily Note          Assessment and Plan:    Ankita Pham is a 89 year old female with a past medical history of IBS, chronic lymphedema, chronic pain, atrial fibrillation s/p PPM, PUD, PE, epidural hematoma 07/2016 who is admitted to medicine for abdominal pain, AMS, CT consistent with colonic distention.     #Metabolic Encephalopathy( Resolved):   Lethargy, altered. Lytes stable, creat stable, ammonia normal, procal <0.05, TSH WNL, glucose 146, hgb stable, platelets stable, WBC count mildly elevated. VBG consistent with respiratory alkalosis. Head CT w/o acute intracranial pathology, stable cystic lesion in right parotic gland. VSS. Likely due to underlying process as below.   -Neuro checks  -Continue to treat underlying GI, cardiology and likely infetious causes as below  -UC ( E coli) and BC in process       #Colonic distention with  pseudo-obstruction:   CT abd/pelvis w/ circumferential thickening of the rectum, severe air distended lops of colon w/ large amt of stool in rectum concerning for early distal colonic obstruction or colonic ileus (Brenda's syndrome), no evidence of ischemia, non dilated small bowel loops. Noted atherosclerotic disease w/ severe stenosis of celiac axis, superior mesenteric arter origins, moderate stenosis of the left common iliac artery. Patient w/ chronic constipation likely 2/2 narcotics. LA mildly elevated at 2.5 on presentation which improved w/ 1 L of IVF bolus. Abdominal exam w/ normoactive bowel sounds, soft, no peritoneal signs.   -GI and CRS consulted, appreciate recs  -Monitor lytes, replace per protocol  -Cdiff is negative   -Serial abdominal exams. Appears to be getting better evidenced by improvement in pain and presence of bowel movements   -Will allow patient to tolerate sips   -NG tube placed when patient amenable. Patient continues to refuse this   -Rectal tube ordered  -Avoid narcotics and  anticholinergics       #Acute exacerbation of heart failure with reduced EF, new onset  #Elevated troponin  Echocardiogram w/ mild LV dilation, severe diffuse hypokinesis, EF of 15-20%, normal RV function, dilation of IVC w/ abnormal respiratory variation, no pericardial effusion, moderate tricuspid insufficiency, mild aortic valve sclerosis, mild mitral [place] of [address]. CT chest w/ elevated RH dysfunction, dilated pulmonary artery, moderate pulmonary edema and new small bilateral pleural effusions R>L. Trop 0.757 in ED, up to 0.962 on admission. Cardiology evaluated, felt likely 2/2 decompensated HF vs. Ongoing ischemia vs. Stress cardiomyopathy.   -Cardiology consulted, appreciate recs  -Diurese w/ IV lasix 40 mg x1  -Tele, continuous pulse ox  -Trops x2 tonight  -Strict Is/Os  -Daily weights  -Continue metoprolol ( changed IV form)     #SA node dysfunction, Atrial fibrillation w/ RVR: s/p PPM placement. PTA on rate control w/ metoprolol. On aspirin for anti platelet. Chads vasc score of 5 (age, CHF, HTN, sex). Warfarin DCed in 07/2016 after an epidural hematoma. HR in 90s-1 teens.   -Cardiology consulted, appreciate recs  -Contniue rate control with metoprolol for now ( Replaced with IV metoprolol)   -S/P 1 dose of 40 mg IV lasix. Will repeat another dose today   -Tele  -Lymphedema consult      #Abnormal UA:   UA w/ 5 ketones, elevated specific gravity, 100 protein, positive nitrite and LE, microscopy w/ 5 WBC, few bacteria. Past cultures in 07/2016, 08/2014, 12/2009 w/ Ecoli S to rocephin. Afebrile, WBC count mildly elevated but procal negative, WBC could be reactive to acute process. S/P 1 g of rocephin and placement of larson in ED.    Given that urine culture is growing E coli,  Will resume oral ceftriaxone and change to oral antibiotic when able to have oral intake        #Chronic PE:   Not on AC 2/2 recent epidural hematoma. No new emboli noted.  -Continue conservative managment  "     #Hypothyroidism:   On synthroid PTA. TSH WNL.   -Continue current dose     #Chronic pain:   On gabapentin 200 qhs, oxycodone 5 mg TID scheduled w/ 5-10 mg q4 PRN for moderate to severe pain PTA. Bowel regimen w/ miralax BID, senna-colace 2 tabs BID, PRN lactulose, dulcolax suppositories. Unclear when last BM was.   -Hold narcotics w/ AMS and colonic distention  -WIll hold bowel regimen for now, consider addition per GI recs           Consulting teams: Gastroenterology/ Colorectal surgery   Code status: DNR/ DNI  DVT Prophylaxis:PCD's   Gastric prophylaxis:PPI  Diet: NPO with spis as tolerated   Disposition: To be determined       Patient seen and examined with RN         Interval History:   Progress notes in the last 24 hrs by MDT team has been reviewed. This is the first time I am meeting with patient.  Her H&P and progress of events have been reviewed. Son Chandu present at bed side   Denies any chest pain/ SOB  Feels that her abdominal distension, which was significant yesterday is much better  Denies fevers or chills   Wants to be able to take some sips of water as mouth very dry  Per son, his mum has a normal mentation. Patient does not recollect much of the events from yesterday.  Does not want NG tube put in    Patient says that she is able to pass wind          Review of Systems:   A comprehensive review of systems was performed and found to be negative except: Those that are outlined in interval history              Medications:   I have reviewed this patient's current medications which are outlined in the \"current medication\" section of EPIC             Physical Exam:   Vitals were reviewed  Temp: 97.6  F (36.4  C) Temp src: Oral BP: (!) 111/91 Pulse: 98 Heart Rate: 105 Resp: 20 SpO2: 97 % O2 Device: Nasal cannula Oxygen Delivery: 3 LPM  Constitutional:   awake, alert, cooperative, no apparent distress, and appears stated age     Lungs:   No increased work of breathing, good air exchange, clear to " auscultation bilaterally, no crackles or wheezing     CVS:   Normal heart sounds. Bilateral edema noted.          Abdomen:   Soft. Distension noted. Normal bowel sounds. No tenderness      Musculoskeletal:   Bilateral lymphedema noted      Neurologic:   Awake, alert, oriented to name, place and time.  Cranial nerves II-XII are grossly intact.            Data:     Most recent labs have been evaluated and relevant labs are outlined below :    BMP    Recent Labs  Lab 03/04/17  0606 03/03/17  1248 03/03/17  1103     --  140   POTASSIUM 3.8 4.1 5.0   CHLORIDE 102  --  103   OMERO 8.7  --  8.4*   CO2 31  --  24   BUN 20  --  16   CR 0.59  --  0.51*   *  --  146*     CBC    Recent Labs  Lab 03/04/17  0606 03/03/17  1103   WBC 8.9 11.9*   RBC 4.33 4.66   HGB 13.5 14.7   HCT 42.6 45.5   MCV 98 98   MCH 31.2 31.5   MCHC 31.7 32.3   RDW 14.4 14.6    263     INR    Recent Labs  Lab 03/04/17  0606 03/03/17  1103   INR 1.07 1.11     LFTs    Recent Labs  Lab 03/04/17  0606 03/03/17  1248 03/03/17  1103   ALKPHOS 75  --  88   AST 14 13 Unsatisfactory specimen - hemolyzedNOTIFIED CARLOS KENYON @1220 03.03.17 UUER. BY EK   ALT 11  --  7   BILITOTAL 0.8  --  0.6   PROTTOTAL 6.3*  --  6.8   ALBUMIN 2.9*  --  3.1*      PANC    Recent Labs  Lab 03/03/17  1103   LIPASE 55*             Case d/w  Gastroenterology team     Dr NBA Rosas MD  Hospitalist ( Internal medicine)  Pager: [phone]

## 2017-03-04 NOTE — PROGRESS NOTES
SPIRITUAL HEALTH SERVICES  SPIRITUAL ASSESSMENT Progress Note  [facility] ([place]) 6B  ON-CALL VISIT    Ankita was visiting with Dr and son on my first attempt and I offered to return later. On 2 nd attempt Ankita was resting with eyes closed as I entered room and reintroduced myself. She smiled and said she told the Dr no more tests. She would like to return to her nursing home she told me. She was thankful her son came to be with her. She wanted to continue to rest. Unit  can follow up next week.      Babatunde Pitts  Chaplain Resident  Pager [phone]

## 2017-03-04 NOTE — PLAN OF CARE
Problem: Goal Outcome Summary  Goal: Goal Outcome Summary  Pt alert and oriented x4, forgetful at times. VSS. Pt complains of intermittent nausea, zofran given x1 with adequate relief. Abdomen less distended and patient passing gas and also has bowel sounds. Pt refuses NG tube at this time. Rectal tube in place for decompression. Penning[place] in place, lasix given x1. Controlled Afib/flutter. Pt allowed to have 500 ml of water in 24 hours. Will continue to monitor per plan of care.

## 2017-03-04 NOTE — PROGRESS NOTES
"CR surgery note  03/04/2017    S/I: rectal tube placed last night, minimal output. Denies abdominal pain this morning.    /82 (BP Location: Left arm)  Pulse 98  Temp 98.3  F (36.8  C) (Axillary)  Resp 20  Ht 1.651 m (5' 5\")  Wt 85.3 kg (188 lb)  SpO2 94%  Breastfeeding? No  BMI 31.28 kg/m2  Resting in bed, abdomen soft, less distended      I/O:   UOP 1400  Stool output not recorded      A/P: 89 year old woman with chronic constipation admitted with AMS. No surgical intervention warranted at this time, family and patient have also expressed that she would not want invasive procedure given her age/frailty. Please page resident with questions/concerns.     Mannie Lin  [facility] surgery resident  "

## 2017-03-04 NOTE — PROVIDER NOTIFICATION
Troponin remains elevated 0.929. Provider notified. No interventions at this time. Will continue to monitor.

## 2017-03-05 LAB
ANION GAP SERPL CALCULATED.3IONS-SCNC: 9 MMOL/L (ref 3–14)
BACTERIA SPEC CULT: ABNORMAL
BUN SERPL-MCNC: 25 MG/DL (ref 7–30)
CALCIUM SERPL-MCNC: 8.2 MG/DL (ref 8.5–10.1)
CHLORIDE SERPL-SCNC: 104 MMOL/L (ref 94–109)
CO2 SERPL-SCNC: 31 MMOL/L (ref 20–32)
CREAT SERPL-MCNC: 0.62 MG/DL (ref 0.52–1.04)
GFR SERPL CREATININE-BSD FRML MDRD: 89 ML/MIN/1.7M2
GLUCOSE SERPL-MCNC: 96 MG/DL (ref 70–99)
Lab: ABNORMAL
MICRO REPORT STATUS: ABNORMAL
MICROORGANISM SPEC CULT: ABNORMAL
POTASSIUM SERPL-SCNC: 3.9 MMOL/L (ref 3.4–5.3)
SODIUM SERPL-SCNC: 144 MMOL/L (ref 133–144)
SPECIMEN SOURCE: ABNORMAL

## 2017-03-05 PROCEDURE — 80048 BASIC METABOLIC PNL TOTAL CA: CPT | Performed by: INTERNAL MEDICINE

## 2017-03-05 PROCEDURE — 99231 SBSQ HOSP IP/OBS SF/LOW 25: CPT | Mod: GC | Performed by: INTERNAL MEDICINE

## 2017-03-05 PROCEDURE — 25000128 H RX IP 250 OP 636: Performed by: PHYSICIAN ASSISTANT

## 2017-03-05 PROCEDURE — 25000132 ZZH RX MED GY IP 250 OP 250 PS 637: Performed by: ORTHOPAEDIC SURGERY

## 2017-03-05 PROCEDURE — 36415 COLL VENOUS BLD VENIPUNCTURE: CPT | Performed by: INTERNAL MEDICINE

## 2017-03-05 PROCEDURE — 25000132 ZZH RX MED GY IP 250 OP 250 PS 637: Performed by: INTERNAL MEDICINE

## 2017-03-05 PROCEDURE — 99232 SBSQ HOSP IP/OBS MODERATE 35: CPT | Performed by: INTERNAL MEDICINE

## 2017-03-05 PROCEDURE — 25000132 ZZH RX MED GY IP 250 OP 250 PS 637: Performed by: PHYSICIAN ASSISTANT

## 2017-03-05 PROCEDURE — 25000125 ZZHC RX 250: Performed by: PHYSICIAN ASSISTANT

## 2017-03-05 PROCEDURE — 25000125 ZZHC RX 250: Performed by: INTERNAL MEDICINE

## 2017-03-05 PROCEDURE — 12000025 ZZH R&B TRANSPLANT INTERMEDIATE

## 2017-03-05 RX ORDER — CEFPODOXIME PROXETIL 200 MG/1
200 TABLET, FILM COATED ORAL 2 TIMES DAILY
Status: DISCONTINUED | OUTPATIENT
Start: 2017-03-05 | End: 2017-03-06 | Stop reason: HOSPADM

## 2017-03-05 RX ORDER — POTASSIUM CHLORIDE 750 MG/1
20 TABLET, EXTENDED RELEASE ORAL DAILY
Status: DISCONTINUED | OUTPATIENT
Start: 2017-03-05 | End: 2017-03-05

## 2017-03-05 RX ORDER — FUROSEMIDE 40 MG
40 TABLET ORAL DAILY
Status: DISCONTINUED | OUTPATIENT
Start: 2017-03-05 | End: 2017-03-06 | Stop reason: HOSPADM

## 2017-03-05 RX ORDER — POLYETHYLENE GLYCOL 3350 17 G/17G
17 POWDER, FOR SOLUTION ORAL DAILY
Status: DISCONTINUED | OUTPATIENT
Start: 2017-03-05 | End: 2017-03-06 | Stop reason: HOSPADM

## 2017-03-05 RX ORDER — LISINOPRIL 5 MG/1
5 TABLET ORAL 2 TIMES DAILY
Status: DISCONTINUED | OUTPATIENT
Start: 2017-03-05 | End: 2017-03-06 | Stop reason: HOSPADM

## 2017-03-05 RX ADMIN — ACETAMINOPHEN 1000 MG: 500 TABLET, FILM COATED ORAL at 19:33

## 2017-03-05 RX ADMIN — ACETAMINOPHEN 1000 MG: 500 TABLET, FILM COATED ORAL at 08:22

## 2017-03-05 RX ADMIN — ASCORBIC ACID TAB 250 MG 500 MG: 250 TAB at 08:23

## 2017-03-05 RX ADMIN — CITALOPRAM HYDROBROMIDE 10 MG: 10 TABLET ORAL at 08:23

## 2017-03-05 RX ADMIN — LEVOTHYROXINE SODIUM 75 MCG: 75 TABLET ORAL at 08:23

## 2017-03-05 RX ADMIN — LISINOPRIL 2.5 MG: 2.5 TABLET ORAL at 08:23

## 2017-03-05 RX ADMIN — DEXTRAN 70, AND HYPROMELLOSE 2910 1 DROP: 1; 3 SOLUTION/ DROPS OPHTHALMIC at 19:33

## 2017-03-05 RX ADMIN — OMEPRAZOLE 20 MG: 20 CAPSULE, DELAYED RELEASE ORAL at 08:23

## 2017-03-05 RX ADMIN — POTASSIUM CHLORIDE 10 MEQ: 14.9 INJECTION, SOLUTION, CONCENTRATE PARENTERAL at 10:52

## 2017-03-05 RX ADMIN — CEFPODOXIME PROXETIL 200 MG: 200 TABLET, FILM COATED ORAL at 16:32

## 2017-03-05 RX ADMIN — MAGNESIUM OXIDE TAB 400 MG (241.3 MG ELEMENTAL MG) 400 MG: 400 (241.3 MG) TAB at 08:22

## 2017-03-05 RX ADMIN — METOPROLOL TARTRATE 2.5 MG: 5 INJECTION INTRAVENOUS at 02:08

## 2017-03-05 RX ADMIN — POTASSIUM CHLORIDE 20 MEQ: 750 TABLET, EXTENDED RELEASE ORAL at 08:23

## 2017-03-05 RX ADMIN — Medication 1 CAPSULE: at 08:23

## 2017-03-05 RX ADMIN — ACETAMINOPHEN 1000 MG: 500 TABLET, FILM COATED ORAL at 14:03

## 2017-03-05 RX ADMIN — ASPIRIN 325 MG: 325 TABLET, DELAYED RELEASE ORAL at 08:23

## 2017-03-05 RX ADMIN — DEXTRAN 70, AND HYPROMELLOSE 2910 1 DROP: 1; 3 SOLUTION/ DROPS OPHTHALMIC at 14:04

## 2017-03-05 RX ADMIN — CEFPODOXIME PROXETIL 200 MG: 200 TABLET, FILM COATED ORAL at 19:33

## 2017-03-05 RX ADMIN — POTASSIUM CHLORIDE 10 MEQ: 7.46 INJECTION, SOLUTION INTRAVENOUS at 09:14

## 2017-03-05 RX ADMIN — FUROSEMIDE 40 MG: 40 TABLET ORAL at 18:03

## 2017-03-05 RX ADMIN — LISINOPRIL 5 MG: 5 TABLET ORAL at 19:33

## 2017-03-05 RX ADMIN — POLYETHYLENE GLYCOL 3350 17 G: 17 POWDER, FOR SOLUTION ORAL at 14:03

## 2017-03-05 RX ADMIN — OMEPRAZOLE 20 MG: 20 CAPSULE, DELAYED RELEASE ORAL at 19:33

## 2017-03-05 NOTE — PROGRESS NOTES
[facility], [place]   Internal Medicine Daily Note          Assessment and Plan:    Ankita Pham is a 89 year old female with a past medical history of IBS, chronic lymphedema, chronic pain, atrial fibrillation s/p PPM, PUD, PE, epidural hematoma 07/2016 who is admitted to medicine for abdominal pain, AMS, CT consistent with colonic distention.     #Metabolic Encephalopathy( Resolved):   Initially manifested by lethargy and disorientation  Thought to be due to the combination of pain medications, UTI as well as acute pain from sub acute bowel obstruction   This has completely resolved now   Was on ceftriaxone for UTI. Given the ability to tolerate orals, will change to oral Vantin to complete a total of 5 days of Abx        #Colonic distention with  pseudo-obstruction:   CT abd/pelvis w/ circumferential thickening of the rectum, severe air distended lops of colon w/ large amt of stool in rectum concerning for early distal colonic obstruction or colonic ileus (Brenda's syndrome), no evidence of ischemia, non dilated small bowel loops. Noted atherosclerotic disease w/ severe stenosis of celiac axis, superior mesenteric arter origins, moderate stenosis of the left common iliac artery. Patient w/ chronic constipation likely 2/2 narcotics. LA mildly elevated at 2.5 on presentation which improved w/ 1 L of IVF bolus. Abdominal exam w/ normoactive bowel sounds, soft, no peritoneal signs.   -GI and CRS consulted, appreciate recs  -Monitor lytes, replace per protocol  -Cdiff is negative   -Serial abdominal exams. Appears to be getting better evidenced by improvement in pain and presence of bowel movements    Tolerated sips very will without further nausea. Patinet appreciating flatus as well  Will Discontinue rectal tube and advance clear liquids as it appears that the obstruction has clinically resolved.       #Acute exacerbation of heart failure with reduced EF, new onset  #Elevated  troponin  Echocardiogram w/ mild LV dilation, severe diffuse hypokinesis, EF of 15-20%, normal RV function, dilation of IVC w/ abnormal respiratory variation, no pericardial effusion, moderate tricuspid insufficiency, mild aortic valve sclerosis, mild mitral annular [place]ification. [place] of [address]. CT chest w/ elevated RH dysfunction, dilated pulmonary artery, moderate pulmonary edema and new small bilateral pleural effusions R>L. Trop 0.757 in ED, up to 0.962 on admission. Cardiology evaluated, felt likely 2/2 decompensated HF vs. Ongoing ischemia vs. Stress cardiomyopathy.   -Cardiology consulted, appreciate recs  -Diuresed w/ IV lasix 40 mg x 2  ( Net negative by 1.5 lts in the last 24 hrs). Holding off on IV lasix today ( 3/5)  -Tele, continuous pulse ox  -Strict Is/Os  -Daily weights ( Trending down)  -Continue metoprolol and Lisinopril increased to 5 mg BID      #SA node dysfunction, Atrial fibrillation w/ RVR:   s/p PPM placement. PTA on rate control w/ metoprolol. On aspirin for anti platelet. Chads vasc score of 5 (age, CHF, HTN, sex). Warfarin DCed in 07/2016 after an epidural hematoma. HR in 90s-1 teens.   -Cardiology consulted, appreciate recs  -Contniue rate control with metoprolol for now ( Replaced with IV metoprolol)   -S/P 2 dose of 40 mg IV lasix.   -Tele  -Lymphedema consult      #Abnormal UA:   UA w/ 5 ketones, elevated specific gravity, 100 protein, positive nitrite and LE, microscopy w/ 5 WBC, few bacteria. Past cultures in 07/2016, 08/2014, 12/2009 w/ Ecoli S to rocephin. Afebrile, WBC count mildly elevated but procal negative, WBC could be reactive to acute process. S/P 1 g of rocephin and placement of larson in ED.   Change to oral vantin this morning as patient tolerating orals and E coli sensitive to cephalosporins        #Chronic PE:   Not on AC 2/2 recent epidural hematoma. No new emboli noted.  -Continue conservative managment      #Hypothyroidism:   On synthroid PTA. TSH WNL.   -Continue  "current dose     #Chronic pain:   On gabapentin 200 qhs, oxycodone 5 mg TID scheduled w/ 5-10 mg q4 PRN for moderate to severe pain PTA. Bowel regimen w/ miralax BID, senna-colace 2 tabs BID, PRN lactulose, dulcolax suppositories.  -Hold narcotics w/ AMS and colonic distention. Patient has not been asking for pain medications   Will begin to add home bowel regimen slowly           Consulting teams: Gastroenterology/ Colorectal surgery   Code status: DNR/ DNI  DVT Prophylaxis:PCD's   Gastric prophylaxis:PPI  Diet:  Initiate clear liquid diet   Disposition: To be determined       Patient seen and examined with RN         Interval History:   Progress notes in the last 24 hrs by MDT team has been reviewed. This is the first time I am meeting with patient.   Ankita feels much better today. In that she feels that her bowel distension has gone down significantly  Denies any fevers or chills   Nausea is much better  Denies any abdominal pain  Wants to be able to drink fluids           Review of Systems:   A comprehensive review of systems was performed and found to be negative except: Those that are outlined in interval history              Medications:   I have reviewed this patient's current medications which are outlined in the \"current medication\" section of EPIC             Physical Exam:   Vitals were reviewed  Temp: 97.4  F (36.3  C) Temp src: Oral BP: (!) 119/106   Heart Rate: 102 Resp: 18 SpO2: 97 % O2 Device: Nasal cannula Oxygen Delivery: 2 LPM  Constitutional:   awake, alert, cooperative, no apparent distress, and appears stated age     Lungs:   No increased work of breathing, good air exchange, clear to auscultation bilaterally, no crackles or wheezing     CVS:   Normal heart sounds. Bilateral edema noted.          Abdomen:   Soft. No significant distension. Normal bowel sounds. No tenderness      Musculoskeletal:   Bilateral lymphedema noted      Neurologic:   Awake, alert, oriented to name, place and time.  " Cranial nerves II-XII are grossly intact.            Data:     Most recent labs have been evaluated and relevant labs are outlined below :    BMP    Recent Labs  Lab 03/05/17  0733 03/04/17  1750 03/04/17  0606 03/03/17  1248 03/03/17  1103    144 140  --  140   POTASSIUM 3.9 3.4 3.8 4.1 5.0   CHLORIDE 104 102 102  --  103   OMERO 8.2* 8.4* 8.7  --  8.4*   CO2 31 30 31  --  24   BUN 25 21 20  --  16   CR 0.62 0.69 0.59  --  0.51*   GLC 96 87 124*  --  146*     CBC    Recent Labs  Lab 03/04/17  0606 03/03/17  1103   WBC 8.9 11.9*   RBC 4.33 4.66   HGB 13.5 14.7   HCT 42.6 45.5   MCV 98 98   MCH 31.2 31.5   MCHC 31.7 32.3   RDW 14.4 14.6    263     INR    Recent Labs  Lab 03/04/17  0606 03/03/17  1103   INR 1.07 1.11     LFTs    Recent Labs  Lab 03/04/17  1750 03/04/17  0606 03/03/17  1248 03/03/17  1103   ALKPHOS 76 75  --  88   AST 14 14 13 Unsatisfactory specimen - hemolyzedNOTIFIED CARLOS KENYON @1220 03.03.17 UUER. BY EK   ALT 8 11  --  7   BILITOTAL 0.4 0.8  --  0.6   PROTTOTAL 6.3* 6.3*  --  6.8   ALBUMIN 3.0* 2.9*  --  3.1*      PANC    Recent Labs  Lab 03/03/17  1103   LIPASE 55*               Dr NBA Rosas MD  Hospitalist ( Internal medicine)  Pager: [phone]

## 2017-03-05 NOTE — PLAN OF CARE
Problem: Goal Outcome Summary  Goal: Goal Outcome Summary  Outcome: No Change  Pt alert and orientedx4 with some forgetfulness. Vital signs stable, O2 saturations WDL on 2L NC. Pt reports mild intermittent nausea, declines medication to treat. Abdomen obese, nondistended. Bowel sounds hypoactive. Rectal tube intact, no output noted in collection bag. Q2H turns for pt, skin intact. Pt has dependent edema to bilat legs; which is baseline. Foot of bed elevated. Will continue to monitor.

## 2017-03-05 NOTE — PLAN OF CARE
Problem: Goal Outcome Summary  Goal: Goal Outcome Summary  Outcome: Improving  Pt had a small bowel movement. Stool color was brown, no sign of bleeding noted. Bowel sounds still hypoactive. Pt complains of intermittent nausea but continues to refuse medication for treatment. Order for removal of rectal tube placed. Pt's diet changed now to clear liquids with fluid restriction of 1200 ml per day. Potassium replaced. Transfer order placed. Will continue to monitor.

## 2017-03-05 NOTE — PROGRESS NOTES
Patient transferred to  via bed.  RN called Chandu (Son) to let them know the new unit and room number.  Patient informed RN that she had a BM, patient had large BM and when rectal tube was pulled out - more BM followed.  Patient cleaned and transferred;     Checked BP at 1215 - 107/89 - held metoprolol at this time; report given to ricki; belongings sent with patient, but no personal belongings was found.

## 2017-03-05 NOTE — PROGRESS NOTES
"Cardiology Progress Note    Events and interval changes in past 24 hours:   She denies orthopnea, dyspnea, chest pain or palpitations  Net -2.5L since admission. Yesterday had 2L UO    OBJECTIVE FINDINGS:  /89  Pulse 98  Temp 97.4  F (36.3  C) (Oral)  Resp 18  Ht 1.651 m (5' 5\")  Wt 84.8 kg (187 lb)  SpO2 97%  Breastfeeding? No  BMI 31.12 kg/m2    Gen: Patient is AOx3, in NAD. Appears comfortable.    CV: irregular, JVP 8cm, S1 &S2, no murmurs, edema in legs nonpitting. 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 Otherwise clear    Intake/Output Summary (Last 24 hours) at 03/05/17 1409  Last data filed at 03/05/17 1400   Gross per 24 hour   Intake              730 ml   Output             1020 ml   Net             -290 ml     Wt Readings from Last 5 Encounters:   03/04/17 84.8 kg (187 lb)   07/15/16 84.3 kg (185 lb 13.6 oz)   03/05/15 72.1 kg (159 lb)   09/02/14 98 kg (216 lb 1.6 oz)   06/30/14 80.3 kg (177 lb)         Labs  Reviewed in epic    Assessment:  We are following this 89 yo female who, admitted on 3/3/17 with colonic distention, for decompensated heart failure with reduced EF of 15-20%, a new finding this admission from her prior echocardiogram in 2005.  Medical history is notable for atrial fibrillation, sinus node dysfunction and AV block s/p PPM 1999 with generator exchange in 2007, epidural hematoma in 7/2016 in the setting of supratherapeutic INR, history of pulmonary embolism, systemic and pulmonary hypertension, and obstructive sleep apnea.     1. Heart failure with reduced EF, 15-20%, of as yet undetermined etiology. Evaluation for coronary artery disease is indicated but can be deferred to after the resolution of her acute illness. Given her age one needs to weigh risks and benefits of aggressive evaluation for cause of her CHF. Echo does not show the typical apical ballooning described in stress cardiomyopathy. She was taking furosemide 40 mg daily prior to " admission.  2. Stably, mildly elevated troponin without chest pain or convincing changes of ischemia on EKG, likely due to decompensated heart failure or stress cardiomyopathy. CT did not show propagation of her PE burden.  3. Atrial fibrillation, rate controlled with metoprolol, on ASA but not anticoagulated despite her CHADSVasc score of at least 5 (chf, htn, age, female) since an epidural hemorrhage 7 months ago.  4. History of chronic pulmonary embolism, seen again on CT this admission.  5. Pulmonary hypertension and RV dysfunction on prior echocardiography, not previously evaluated with right heart catheterization.  6. Colonic distention, being evaluated by internal medicine and surgery.     Recommendations:    She appears euvolemic on exam today. Transition to PO diuretic like Lasix 40mg once daily. Can Aim for euvolemia    Regarding neurohormonal therapy for heart failure:    Continue metoprolol, which will need to eventually be switched to the oral succinate (XL) formulation.     Continue lisinopril 5 mg BID (increased yesterday)    Follow up in CORE clinic within one week of discharge and in Cardiology clinic in one month    Staffed with Dr. Henderson. We will sign off    Watson Fuentes  General cards fellow, PGY4  Pager [phone]    ATTENDING ATTESTATION:  Patient has been seen and evaluated by me on March 5, 2017. I have reviewed the medications, laboratory, imaging, and other relevant results.  I agree with the above note which I have edited, as necessary.  I have discussed my assessment and plan with the house staff.    Rossy Henderson MD, MS  Staff Cardiologist, [facility]  Pager: [phone]

## 2017-03-05 NOTE — PROGRESS NOTES
Patient transferred from  this afternoon via her bed. She had large, incontinent stool before transfer. Tolerating clear liquids, is on 1200cc fluid restriction. Tylenol for generalized back pain. Family member here for brief visit. Patient rolls from side to side with assist of 1-2 and states she is unable to tolerate sitting in a chair so is mostly bedridden. Bilateral lower extremities with moderate edema/fatty tissue. Patient re-oriented to call light and updated on plan of care. O2 sats drop on room air, 78-95% on room air, placed on 2-3L at 94%. Patient says she has been discharged to her facility with O2 before but never uses it. Healed coccyx ulcer, patient reports it healed within the last 3 months. Hyperactive bowel sounds, audible standing by the bedside, 1 small smear of stool.

## 2017-03-05 NOTE — PROVIDER NOTIFICATION
BP elevated at 156/101. AM metoprolol given, no PRN in place to treat BP. Pt denies CP, SOB or any visual disturbances. Heart rhythm in a flutter with controlled ventricular rate. New order for metoprolol placed.

## 2017-03-05 NOTE — PLAN OF CARE
Problem: Goal Outcome Summary  Goal: Goal Outcome Summary  Outcome: No Change  Pt A&O, forgetful at times. VSS on 2L NC, increase in O2 needs with sleep, appears to have some apneic periods with sleep. Afib, rate controlled. Frequent repositioning. Adequate UOP via larson, rectal tube for decompression with smear. K+ recheck 3.4, replaced with 20mEq IV Potassium. Pt allowed to have 500 mL H20 in 24 hrs. Continue with POC and notify MD of any changes.

## 2017-03-05 NOTE — PROGRESS NOTES
Cardiology follow up note    Assessment:  We are following this 89 yo female who, admitted on 3/3/17 with colonic distention, for decompensated heart failure with reduced EF of 15-20%, a new finding this admission from her prior echocardiogram in 2005.  Medical history is notable for atrial fibrillation, sinus node dysfunction and AV block s/p PPM 1999 with generator exchange in 2007, epidural hematoma in 7/2016 in the setting of supratherapeutic INR, history of pulmonary embolism, systemic and pulmonary hypertension, and obstructive sleep apnea.    1. Heart failure with reduced EF, 15-20%, of as yet undetermined etiology.  She remains hypervolemic on examination today.  Evaluation for coronary artery disease is indicated but can be deferred to after the resolution of her acute illness.  Echo does not show the typical apical ballooning described in stress cardiomyopathy.  She was taking furosemide 40 mg daily prior to admission.  2. Stably, mildly elevated troponin without chest pain or convincing changes of ischemia on EKG, likely due to decompensated heart failure or stress cardiomyopathy.  CT did not show propagation of her PE burden.  3. Atrial fibrillation, rate controlled with metoprolol, on ASA but not anticoagulated despite her CHADSVasc score of at least 5 (chf, htn, age, female) since an epidural hemorrhage 7 months ago.  4. History of chronic pulmonary embolism, seen again on CT this admission.  5. Pulmonary hypertension and RV dysfunction on prior echocardiography, not previously evaluated with right heart catheterization.  6. Colonic distention, being evaluated by internal medicine and surgery.    Recommendations:    Continue Furosemide 40 mg IV daily with the goal of achieving a net diuresis of 1.5-2 L today.    Regarding neurohormonal therapy for heart failure:    Continue metoprolol, which will need to eventually be switched to the oral succinate (XL) formulation.    Increase lisinopril to 5 mg  "BID.    I discussed the patient with Dr. Rossy Henderson.    Charbel Osman MD  Cardiovascular disease fellow  __________________________________________      Interval history:  She is more alert & comfortable today.  Abdominal pain is improved.  She is not dyspneic and denies orthopnea.  Lower extremity edema has gradually worsened over at least the past few weeks, per her son.    Examination:  /66 (BP Location: Left arm)  Pulse 98  Temp 98  F (36.7  C) (Oral)  Resp 20  Ht 1.651 m (5' 5\")  Wt 85.3 kg (188 lb)  SpO2 96%  Breastfeeding? No  BMI 31.28 kg/m2  Alert & oriented fully.  JVP is elevated.  Irregularly irregular rhythm.  Normal S1 and S2.  Warm extremities with stable lymphedema in both lower legs.  Not jaundiced.    Labs, imaging & procedures were reviewed.  Recent Labs   Lab Test  03/04/17   0606   03/03/17   1103  07/15/16   1147   07/14/16   0600  07/14/16   0017  03/29/15   0809   CR  0.59   --   0.51*  0.53   --   0.49*  0.54  0.48*   K  3.8   < >  5.0  4.7   --   4.4  4.8  4.6   BUN  20   --   16  16   --   15  16  18   NA  140   --   140  134   < >  134  132*  137   HGB  13.5   --   14.7  11.1*   --   11.4*  11.9  13.1   PLT  209   --   263  207   --   284  311  211   WBC  8.9   --   11.9*  9.3   --   10.9  11.4*  14.2*    < > = values in this interval not displayed.     ATTENDING ATTESTATION:  Patient has been seen and evaluated by me on March 4, 2017. I agree with the discharge summary note above. I have reviewed pertinent labs and studies. More than 30 minutes was spent organizing this patient's hospital discharge.     Rossy Henderson MD, MS  Staff Cardiologist, [facility]   Pager: [phone]    "

## 2017-03-06 VITALS
OXYGEN SATURATION: 87 % | HEIGHT: 65 IN | TEMPERATURE: 97.8 F | BODY MASS INDEX: 31.16 KG/M2 | DIASTOLIC BLOOD PRESSURE: 89 MMHG | WEIGHT: 187 LBS | HEART RATE: 103 BPM | RESPIRATION RATE: 16 BRPM | SYSTOLIC BLOOD PRESSURE: 112 MMHG

## 2017-03-06 LAB
HIV 1+2 AB+HIV1 P24 AG SERPL QL IA: NORMAL
INTERPRETATION ECG - MUSE: NORMAL
POTASSIUM SERPL-SCNC: 3.7 MMOL/L (ref 3.4–5.3)

## 2017-03-06 PROCEDURE — 25000132 ZZH RX MED GY IP 250 OP 250 PS 637: Performed by: INTERNAL MEDICINE

## 2017-03-06 PROCEDURE — 25000132 ZZH RX MED GY IP 250 OP 250 PS 637: Performed by: PHYSICIAN ASSISTANT

## 2017-03-06 PROCEDURE — 99239 HOSP IP/OBS DSCHRG MGMT >30: CPT | Performed by: INTERNAL MEDICINE

## 2017-03-06 PROCEDURE — 84132 ASSAY OF SERUM POTASSIUM: CPT | Performed by: INTERNAL MEDICINE

## 2017-03-06 PROCEDURE — 36415 COLL VENOUS BLD VENIPUNCTURE: CPT | Performed by: INTERNAL MEDICINE

## 2017-03-06 PROCEDURE — 40000893 ZZH STATISTIC PT IP EVAL DEFER

## 2017-03-06 RX ORDER — LISINOPRIL 5 MG/1
5 TABLET ORAL 2 TIMES DAILY
Qty: 30 TABLET | DISCHARGE
Start: 2017-03-06

## 2017-03-06 RX ORDER — FUROSEMIDE 40 MG
40 TABLET ORAL DAILY
Qty: 30 TABLET | DISCHARGE
Start: 2017-03-06

## 2017-03-06 RX ORDER — OXYCODONE HYDROCHLORIDE 5 MG/1
5 TABLET ORAL EVERY 8 HOURS PRN
Qty: 40 TABLET | Refills: 0 | Status: ON HOLD | OUTPATIENT
Start: 2017-03-06 | End: 2020-11-18

## 2017-03-06 RX ORDER — POLYETHYLENE GLYCOL 3350 17 G/17G
17 POWDER, FOR SOLUTION ORAL DAILY
Qty: 7 PACKET | DISCHARGE
Start: 2017-03-06

## 2017-03-06 RX ORDER — AMOXICILLIN 250 MG
2 CAPSULE ORAL 2 TIMES DAILY
Qty: 100 TABLET | DISCHARGE
Start: 2017-03-06

## 2017-03-06 RX ORDER — LORAZEPAM 1 MG/1
0.5 TABLET ORAL DAILY PRN
Qty: 30 TABLET | Refills: 0 | Status: ON HOLD | OUTPATIENT
Start: 2017-03-06 | End: 2020-11-18

## 2017-03-06 RX ORDER — CEFPODOXIME PROXETIL 200 MG/1
200 TABLET, FILM COATED ORAL 2 TIMES DAILY
Qty: 6 TABLET | Refills: 0 | DISCHARGE
Start: 2017-03-06 | End: 2017-03-09

## 2017-03-06 RX ADMIN — DEXTRAN 70, AND HYPROMELLOSE 2910 1 DROP: 1; 3 SOLUTION/ DROPS OPHTHALMIC at 14:04

## 2017-03-06 RX ADMIN — CITALOPRAM HYDROBROMIDE 10 MG: 10 TABLET ORAL at 08:06

## 2017-03-06 RX ADMIN — MAGNESIUM OXIDE TAB 400 MG (241.3 MG ELEMENTAL MG) 400 MG: 400 (241.3 MG) TAB at 08:07

## 2017-03-06 RX ADMIN — POTASSIUM CHLORIDE 20 MEQ: 750 TABLET, EXTENDED RELEASE ORAL at 14:02

## 2017-03-06 RX ADMIN — Medication 1 CAPSULE: at 08:06

## 2017-03-06 RX ADMIN — ACETAMINOPHEN 1000 MG: 500 TABLET, FILM COATED ORAL at 08:07

## 2017-03-06 RX ADMIN — FUROSEMIDE 40 MG: 40 TABLET ORAL at 08:06

## 2017-03-06 RX ADMIN — LEVOTHYROXINE SODIUM 75 MCG: 75 TABLET ORAL at 08:07

## 2017-03-06 RX ADMIN — POTASSIUM CHLORIDE 20 MEQ: 750 TABLET, EXTENDED RELEASE ORAL at 08:06

## 2017-03-06 RX ADMIN — DEXTRAN 70, AND HYPROMELLOSE 2910 1 DROP: 1; 3 SOLUTION/ DROPS OPHTHALMIC at 08:07

## 2017-03-06 RX ADMIN — CEFPODOXIME PROXETIL 200 MG: 200 TABLET, FILM COATED ORAL at 08:07

## 2017-03-06 RX ADMIN — ASPIRIN 325 MG: 325 TABLET, DELAYED RELEASE ORAL at 08:07

## 2017-03-06 RX ADMIN — Medication 12.5 MG: at 08:06

## 2017-03-06 RX ADMIN — ASCORBIC ACID TAB 250 MG 500 MG: 250 TAB at 09:50

## 2017-03-06 RX ADMIN — OMEPRAZOLE 20 MG: 20 CAPSULE, DELAYED RELEASE ORAL at 08:06

## 2017-03-06 RX ADMIN — LISINOPRIL 5 MG: 5 TABLET ORAL at 08:06

## 2017-03-06 RX ADMIN — ACETAMINOPHEN 1000 MG: 500 TABLET, FILM COATED ORAL at 14:04

## 2017-03-06 ASSESSMENT — VISUAL ACUITY: OU: BASELINE

## 2017-03-06 NOTE — PROGRESS NOTES
Brief GI Note    Pt seen and evaluated this am, had large loose BM this am. Abdomen is soft and NT. D/C to facility planned today.    Cont Miralax, increase to BID as needed to achieve 1-2 soft BMs per day. Suppository or tap water enema PRN. Change position as often as possible.     Will sign off.     Tameka Sanz MD  GI Fellow

## 2017-03-06 NOTE — PLAN OF CARE
Problem: Goal Outcome Summary  Goal: Goal Outcome Summary  PT: PT orders received, per discussion with pt pt is bedridden at nursing home, only gets out of bed for showering and gets lifted into w/c.  Per RN/chart pt to d/c back to nursing home today possibly.  At this time due to low prior level of function and pending d/c PT will not be initiating treatment at this time.  Orders completed, re-consult if needed.

## 2017-03-06 NOTE — PLAN OF CARE
"Problem: Goal Outcome Summary  Goal: Goal Outcome Summary  Outcome: No Change  /75 (BP Location: Left arm)  Pulse 80  Temp 97.5  F (36.4  C) (Oral)  Resp 16  Ht 1.651 m (5' 5\")  Wt 84.8 kg (187 lb)  SpO2 97%  Breastfeeding? No  BMI 31.12 kg/m2  Pt resting quietly throughout shift without complaints. VSS on RA. Pennington in place with adequate OP. Arouses to voice; clear, logical speech. On telemetry; atrial flutter with PVCs.  Call light in reach. Frequent rounding preformed.      "

## 2017-03-06 NOTE — PROGRESS NOTES
Social Work Services Discharge Note      Patient Name:  Ankita Pham     Anticipated Discharge Date:  3/6/2017     Discharge Disposition:   LT:  [facility] [phone], [phone]    PAS not needed as pt is a long-term care resident     Additional Services/Equipment Arranged:  A stretcher ride through [employer] [phone] at 2:30PM, PCS form is on pt's hard cover chart.      Patient / Family response to discharge plan:  In agreement, Dr. Rosas has updated pt's son      Persons notified of above discharge plan:  Dr. Rosas, RNCC, bedside RN and the patient    Staff Discharge Instructions:  SW will fax d/c orders  Please print a packet and send with patient.     CTS Handoff completed:  YES    Medicare Notice of Rights provided to the patient/family:  YES    ALEX Delcid, [facility]    Ph: [phone]  Pager: [phone]

## 2017-03-06 NOTE — DISCHARGE INSTRUCTIONS
You will be contacted by the [facility] CORE clinic within 2 days of discharge to schedule your follow up appointment. This appointment should be within 1 week of discharge. If you do not hear back from them within 48 hours of discharge, please call [phone].

## 2017-03-06 NOTE — DISCHARGE SUMMARY
[facility] Discharge Summary    Ankita Pham MRN# [id number]   Age: 89 year old YOB: 1921     Date of Admission:  3/3/2017  Date of Discharge::  3/6/2017  Admitting Physician:  Guru Lewis MD  Discharge Physician:           Dr Rashad Rosas MD   Primary Physician: Viola Mathew Md  Transferring Facility: [facility]            Discharge Diagnosis:   Principle diagnosis:   Metabolic encephalopathy  Sub acute functional small bowel obstruction    Acute on chronic systolic heart failure     Secondary diagnoses:  A Fib with RVR  UTI  Chronic PE  Chronic pain  Hypothyroidism          Procedures:   No procedures performed during this admission         Allergies:      Allergies   Allergen Reactions     Cortisone Other (See Comments)     Mental changes      Nuts Anaphylaxis               Discharge Medications:     Current Discharge Medication List      START taking these medications    Details   lisinopril (PRINIVIL/ZESTRIL) 5 MG tablet Take 1 tablet (5 mg) by mouth 2 times daily  Qty: 30 tablet    Associated Diagnoses: Hypertension goal BP (blood pressure) < 140/90      cefpodoxime (VANTIN) 200 MG tablet Take 1 tablet (200 mg) by mouth 2 times daily for 3 days  Qty: 6 tablet, Refills: 0    Associated Diagnoses: Urinary tract infection without hematuria, site unspecified      furosemide (LASIX) 40 MG tablet Take 1 tablet (40 mg) by mouth daily  Qty: 30 tablet    Associated Diagnoses: Cardiac pacemaker in situ         CONTINUE these medications which have CHANGED    Details   oxyCODONE (ROXICODONE) 5 MG IR tablet Take 1 tablet (5 mg) by mouth every 8 hours as needed for moderate to severe pain  Qty: 40 tablet, Refills: 0    Associated Diagnoses: Epidural hematoma (H)      LORazepam (ATIVAN) 1 MG tablet Take 0.5 tablets (0.5 mg) by mouth daily as needed for anxiety  Qty: 30 tablet, Refills: 0    Associated Diagnoses: Delirium      senna-docusate  (SENOKOT-S;PERICOLACE) 8.6-50 MG per tablet Take 2 tablets by mouth 2 times daily  Qty: 100 tablet    Comments: Hold for loose stools  Associated Diagnoses: Slow transit constipation      polyethylene glycol (MIRALAX/GLYCOLAX) Packet Take 17 g by mouth daily  Qty: 7 packet    Comments: Hold for loose stools  Associated Diagnoses: Slow transit constipation         CONTINUE these medications which have NOT CHANGED    Details   METOPROLOL SUCCINATE ER PO Take 12.5 mg by mouth At Bedtime      SIMETHICONE-80 PO Take 80 mg by mouth 2 times daily as needed for intestinal gas in addition to scheduled doses      aspirin  MG tablet Take 325 mg by mouth daily   Qty: 40 tablet    Associated Diagnoses: Other specified cardiac dysrhythmias(427.89)      Gabapentin (NEURONTIN PO) Take 200 mg by mouth daily      Citalopram Hydrobromide (CELEXA PO) Take 10 mg by mouth daily      lactulose (CHRONULAC) 10 GM/15ML solution Take 30 mLs by mouth daily as needed for constipation      acetaminophen (TYLENOL) 500 MG tablet Take 2 tablets (1,000 mg) by mouth 3 times daily  Qty: 60 tablet, Refills: 1    Associated Diagnoses: Chronic pain syndrome      magnesium oxide (MAG-OX) 400 MG tablet Take 1 tablet (400 mg) by mouth daily  Qty: 60 tablet, Refills: 0    Associated Diagnoses: Ileus (H)      albuterol (2.5 MG/3ML) 0.083% nebulizer solution Take 1 vial (2.5 mg) by nebulization every 6 hours as needed for shortness of breath / dyspnea or wheezing  Qty: 360 mL    Associated Diagnoses: Shortness of breath      bisacodyl (DULCOLAX) 10 MG suppository Place 1 suppository (10 mg) rectally daily as needed for constipation  Qty: 30 suppository    Associated Diagnoses: Ileus (H)      omeprazole 20 MG tablet Take 1 tablet (20 mg) by mouth 2 times daily  Qty: 30 tablet    Associated Diagnoses: Abdominal pain, other specified site      potassium chloride SA (K-DUR,KLOR-CON M) 20 MEQ tablet Take 1 tablet (20 mEq) by mouth daily  Qty: 60 tablet     Associated Diagnoses: Ileus (H)      Ondansetron HCl (ZOFRAN PO) Take 4 mg by mouth every 6 hours as needed for nausea or vomiting      multivitamin  with lutein (OCUVITE WITH LTEIN) CAPS Take 1 capsule by mouth daily  Refills: 0    Associated Diagnoses: Physical deconditioning      hypromellose (ARTIFICIAL TEARS) 0.5 % SOLN Place 1 drop into both eyes 3 times daily      levothyroxine (SYNTHROID, LEVOTHROID) 75 MCG tablet Take 1 tablet (75 mcg) by mouth daily  Qty: 90 tablet, Refills: 3    Associated Diagnoses: Unspecified hypothyroidism      ascorbic acid 500 MG TABS Take 1 tablet (500 mg) by mouth daily  Qty: 30 tablet                   Consultations:   Consultation during this admission received from cardiology, gastroenterology and colon and rectal surgery           Brief History of Presenting Illness:   Patient was unable to give history on admission on 3/3/2017  NH documentation note she was off that morning, complained of abdominal pain and distention. EMS was called to to AMS.      Her daughter in law notes that she has issues with chronic constipation. She notes the patient is on narcotics. She has been immobile and bedbound for the last few years, refusing to get out of bed. She has wounds 2/2 this.      Please see detailed H&P by Dr Lewis for further details           Hospital Course:   Ankita Pham is a 89 year old female with a past medical history of IBS, chronic lymphedema, chronic pain, atrial fibrillation s/p PPM, PUD, PE, epidural hematoma 07/2016 who is admitted to medicine for abdominal pain, AMS, CT consistent with colonic distention.     #Metabolic Encephalopathy( Resolved):   Initially manifested by lethargy and disorientation  Thought to be due to the combination of pain medications, UTI as well as acute pain from sub acute bowel obstruction  This has completely resolved now  Was on ceftriaxone for UTI. Given the ability to tolerate orals, will change to oral Vantin to complete a total  of 5 days of Abx          #Colonic distention with pseudo-obstruction:  CT abd/pelvis w/ circumferential thickening of the rectum, severe air distended lops of colon w/ large amt of stool in rectum concerning for early distal colonic obstruction or colonic ileus (Scarborough's syndrome), no evidence of ischemia, non dilated small bowel loops. Noted atherosclerotic disease w/ severe stenosis of celiac axis, superior mesenteric arter origins, moderate stenosis of the left common iliac artery. Patient w/ chronic constipation likely 2/2 narcotics. LA mildly elevated at 2.5 on presentation which improved w/ 1 L of IVF bolus. Abdominal exam w/ normoactive bowel sounds, soft, no peritoneal signs.   -GI and CRS consulted, appreciate recs  -Monitor lytes, replace per protocol  -Cdiff is negative   -Serial abdominal exams. Appears to be getting better evidenced by improvement in pain and presence of bowel movements   Was initiated on clear liquids and tolerated to full liquid diets and regular diet at discharge       #Acute exacerbation of heart failure with reduced EF, new onset  #Elevated troponin  Echocardiogram w/ mild LV dilation, severe diffuse hypokinesis, EF of 15-20%, normal RV function, dilation of IVC w/ abnormal respiratory variation, no pericardial effusion, moderate tricuspid insufficiency, mild aortic valve sclerosis, mild mitral annular [place]ification. [place] of [address]. CT chest w/ elevated RH dysfunction, dilated pulmonary artery, moderate pulmonary edema and new small bilateral pleural effusions R>L. Trop 0.757 in ED, up to 0.962 on admission. Cardiology evaluated, felt likely 2/2 decompensated HF vs. Ongoing ischemia vs. Stress cardiomyopathy.   -Cardiology consulted, appreciate recs  -Diuresed w/ IV lasix 40 mg x 2  And transitioned to 40 mg of lasix daily per cardiology team recommendations  -Daily weights ( Trending down)  -Continue metoprolol and Lisinopril increased to 5 mg BID   -She will follow up with CORE  "clinic with [facility] in 1 month time. Referral has already been made       #SA node dysfunction, Atrial fibrillation w/ RVR:  s/p PPM placement. PTA on rate control w/ metoprolol. On aspirin for anti platelet. Chads vasc score of 5 (age, CHF, HTN, sex). Warfarin DCed in 07/2016 after an epidural hematoma. HR in 90s-1 teens.   -Cardiology consulted, appreciate recs  -Contniue rate control with metoprolol for now   -S/P 2 dose of 40 mg IV lasix.   -Tele  -Lymphedema consult       #Abnormal UA:  UA w/ 5 ketones, elevated specific gravity, 100 protein, positive nitrite and LE, microscopy w/ 5 WBC, few bacteria. Past cultures in 07/2016, 08/2014, 12/2009 w/ Ecoli S to rocephin. Afebrile, WBC count mildly elevated but procal negative, WBC could be reactive to acute process. S/P 1 g of rocephin and placement of larson in ED.   Change to oral vantin on 3/5 as patient tolerating orals and E coli sensitive to cephalosporins          #Chronic PE:   Not on AC 2/2 recent epidural hematoma. No new emboli noted.  -Continue conservative managment       #Hypothyroidism:   On synthroid PTA. TSH WNL.   -Continue current dose      #Chronic pain:  On gabapentin 200 qhs, oxycodone 5 mg TID scheduled w/ 5-10 mg q4 PRN for moderate to severe pain PTA. Bowel regimen w/ miralax BID, senna-colace 2 tabs BID, PRN lactulose, dulcolax suppositories.   Narcotics were held for the entire stay here. This begs to re-review narcotic regimen in this patient. We have discontinued scheduled narcotics and changed to PRN at discharge  Bowel regimen to resume on discharge ( please hold for loose stools)                PROGRESS ON DISCHARGE DAY   Feels well today. Denies chest pain/ SOB  Tolerating full liquid diet and graduated to regular adult diet  Denies abdominal pain  Denies nausea  Wants to be able to go back to NH today       /89  Pulse 103  Temp 97.8  F (36.6  C) (Oral)  Resp 16  Ht 1.651 m (5' 5\")  Wt 84.8 kg (187 lb)  SpO2 94%  " Breastfeeding? No  BMI 31.12 kg/m2    CVS: Normal Heart sounds   RS: Clear breath sounds bilaterally   Abdomen: Soft and non tender. Normal bowel sounds.   Neuro: Alert and orientated X 3          Pending Tests at Discharge:     Unresulted Labs Ordered in the Past 30 Days of this Admission     Date and Time Order Name Status Description    3/3/2017 1109 Blood culture Preliminary     3/3/2017 1109 Blood culture Preliminary                  Discharge instructions and Follow up:       Discharge Procedure Orders  CARDIOVASCULAR CORE CLINIC REFERRAL     General info for SNF   Order Comments: Length of Stay Estimate: Long Term Care  Condition at Discharge: Improving  Level of care:skilled   Rehabilitation Potential: Poor  Admission H&P remains valid and up-to-date: Yes  Recent Chemotherapy: N/A  Use Nursing Home Standing Orders: Yes     Mantoux instructions   Order Comments: Give two-step Mantoux (PPD) Per Facility Policy Yes     Reason for your hospital stay   Order Comments: Encephalopathy and subacute small bowel obstruction     Daily weights   Order Comments: Call Provider for weight gain of more than 2 pounds per day or 5 pounds per week.     Pennington catheter   Order Comments: To straight gravity drainage. Change catheter every 2 weeks and PRN for leaking or decreased uring output with signs of bladder distention. DO NOT change catheter without a specific MD order IF diagnosis of benign prostatic hypertrophy (BPH), neurogenic bladder, or other urological conditions     Follow Up and recommended labs and tests   Order Comments: Follow up with NH physician in 1 week of discharge  Please follow up with Cardiology CORE clinic within 4 weeks of discharge     Weight bearing status   Order Comments: Patient has stopped getting out of bed or sitting in chair.  All therapy to be confined to bed bound exercises, unless we can re -evaluate her to see if we can start getting out of bed with assistance.  No contraindication for  weight bearing     Activity - Up with nursing assistance   Order Comments: Patient is largely bed bound. Encourage with PT to get out if possible   Order Specific Question Answer Comments   Is discharge order? Yes      DNR/DNI     Physical Therapy Adult Consult   Order Comments: Evaluate and treat as clinically indicated.    Reason: Deconditioning     Occupational Therapy Adult Consult   Order Comments: Evaluate and treat as clinically indicated.    Reason:  Deconditioning     Occupational Therapy Adult Consult   Order Comments: Evaluate and treat as clinically indicated.    Reason: Lymphedema therapy     Oxygen - Nasal cannula   Order Comments: 2  Lpm by nasal cannula to keep O2 sats 88% or greater.  Please use incentive spirometer if patient is not comfortable with Oxygen     Pneumatic Compression Device    Order Comments: Bilateral calf. Remove 30 mins BID.     Advance Diet as Tolerated   Order Comments: Follow this diet upon discharge: Orders Placed This Encounter     Fluid restriction 1500 ML FLUID     Regular Diet Adult   Order Specific Question Answer Comments   Is discharge order? Yes                Discharge Disposition:   Discharged to NH          Time spent : 35  mts total with patient and coordination of care  Discussed with monik Schofield about discharge plan on phone        Dr NBA Rosas MD  Hospitalist ( Internal medicine)  Pager: [phone]

## 2017-03-06 NOTE — PLAN OF CARE
Problem: Goal Outcome Summary  Goal: Goal Outcome Summary  Outcome: No Change  D: Patient discharged back to nursing facility at 1445. Patient left unit via stretcher accompanied by [facility].    I: PIV and tele removed. Report to facility given Education completed.    A: Pt verbalized understanding of discharge, pt exhibited some soft BP's before leaving, she continues to be asymptomatic. MD came to see her and called facility to make sure they would take her, and they were OK with it.    P: Patient to follow-up in her facility.

## 2017-03-07 ENCOUNTER — CARE COORDINATION (OUTPATIENT)
Dept: CARDIOLOGY | Facility: CLINIC | Age: 82
End: 2017-03-07

## 2017-03-07 NOTE — PROGRESS NOTES
"[facility]  \"Hello, my name is Tatiana Aggarwal , and I am calling from the [facility].  I want to check in and see how you are doing, after leaving the hospital.  You may also receive a call from your Care Coordinator (care team), but I want to make sure you don t have any urgent needs.  I have a couple questions to review with you:     Post-Discharge Outreach                                                    Ankita Pham is a 89 year old female     Follow-up Appointments           Follow Up and recommended labs and tests       Follow up with NH physician in 1 week of discharge  Please follow up with Cardiology CORE clinic within 4 weeks of discharge                       Your next 10 appointments already scheduled            Apr 06, 2017 2:30 PM CDT   (Arrive by 2:15 PM)   Return Visit with Sean Fernandez MD   [facility] ([facility])     [address]  [address] [facility] [address] [phone]                  May 16, 2017 10:00 AM CDT   Phone Device Check with ACKERMAN TECH1   [facility] ([facility])     [address] [place] [id number] [phone]              Care Team:    Patient Care Team       Relationship Specialty Notifications Start End    Other Clinic, Md PCP - General Clinic  3/29/15     Comment:  Per Patient. Sees Dr. Luna at [facility] patient is at. [address], [place] [address]. [phone]          Teresa Jolley, RN Nurse Coordinator Neurosurgery Admissions 8/30/16     Phone: [phone]         Sean Fernandez MD MD Cardiology  3/6/17     Phone: [phone] Pager: [phone] Fax: [phone]         [facility] [address] [place] [address] [address] [facility] [address]            Transition of Care Review                                                      Did you have a surgery or procedure during " your hospital visit? No   If yes, do you have any of the following:     Signs of infection:  No:      Pain:  No         Wound/incision concerns? N/A    Do you have all of your medications/refills?  Yes    Are you having any side effects or questions about your medication(s)? No    Do you have any new or worsening symptoms?  No    Do you have any future appointments scheduled?   NO             Plan                                                      Thanks for your time.  Your Care Coordinator may follow-up within the next couple days.  In the meantime if you have questions, concerns or problems call your care team.        Tatiana Aggarwal

## 2017-03-09 LAB
BACTERIA SPEC CULT: NO GROWTH
BACTERIA SPEC CULT: NO GROWTH
MICRO REPORT STATUS: NORMAL
MICRO REPORT STATUS: NORMAL
SPECIMEN SOURCE: NORMAL
SPECIMEN SOURCE: NORMAL

## 2017-03-10 ENCOUNTER — PRE VISIT (OUTPATIENT)
Dept: CARDIOLOGY | Facility: CLINIC | Age: 82
End: 2017-03-10

## 2017-03-10 ENCOUNTER — TELEPHONE (OUTPATIENT)
Dept: CARDIOLOGY | Facility: CLINIC | Age: 82
End: 2017-03-10

## 2017-03-10 DIAGNOSIS — I50.22 CHRONIC SYSTOLIC HEART FAILURE (H): Primary | ICD-10-CM

## 2017-03-10 NOTE — TELEPHONE ENCOUNTER
Pt's skilled nursing facility, [facility], called to state they would like to draw her labs there at their facility prior to her appointment with Eli Coelho on 3/27. Lab orders are not in Epic yet, but once they are in, a copy can be faxed to [phone] ATTN: Maryam, and they will draw those labs and fax the results back to us.

## 2017-05-16 ENCOUNTER — ALLIED HEALTH/NURSE VISIT (OUTPATIENT)
Dept: CARDIOLOGY | Facility: CLINIC | Age: 82
End: 2017-05-16
Payer: COMMERCIAL

## 2017-05-16 DIAGNOSIS — Z95.0 CARDIAC PACEMAKER IN SITU: Primary | ICD-10-CM

## 2017-05-16 PROCEDURE — 93293 PM PHONE R-STRIP DEVICE EVAL: CPT | Performed by: INTERNAL MEDICINE

## 2017-05-16 NOTE — PROGRESS NOTES
~ 90 day phone teletrace ~  Intrinsic rhythm at time of check. Magnet response WNL. Will contact [facility] and have rep do annual threshold at care facility sometime in August. Daja FONSECA

## 2017-05-16 NOTE — MR AVS SNAPSHOT
After Visit Summary   5/16/2017    Ankita Pham    MRN: [id number]           Patient Information     Date Of Birth          8/3/1921        Visit Information        Provider Department      5/16/2017 10:00 AM ACKERMAN TECH1 [facility] [place]        Today's Diagnoses     Cardiac pacemaker in situ    -  1       Follow-ups after your visit        Who to contact     If you have questions or need follow up information about today's clinic visit or your schedule please contact [facility] [place] directly at [phone].  Normal or non-critical lab and imaging results will be communicated to you by NuScriptRxhart, letter or phone within 4 business days after the clinic has received the results. If you do not hear from us within 7 days, please contact the clinic through [employer] or phone. If you have a critical or abnormal lab result, we will notify you by phone as soon as possible.  Submit refill requests through [employer] or call your pharmacy and they will forward the refill request to us. Please allow 3 business days for your refill to be completed.          Additional Information About Your Visit        MyChart Information     Lucky Sort gives you secure access to your electronic health record. If you see a primary care provider, you can also send messages to your care team and make appointments. If you have questions, please call your primary care clinic.  If you do not have a primary care provider, please call [phone] and they will assist you.        Care EveryWhere ID     This is your Care EveryWhere ID. This could be used by other organizations to access your [place] medical records  [id number]         Blood Pressure from Last 3 Encounters:   03/06/17 112/89   08/30/16 118/62   07/17/16 127/61    Weight from Last 3 Encounters:   03/04/17 84.8 kg (187 lb)   07/15/16 84.3 kg (185 lb 13.6 oz)   03/05/15 72.1 kg (159 lb)              We Performed  the Following     PM PHONE R JUDY CURRY UP TO 90 DAYS ([id number])        Primary Care Provider    Md Other Clinic                Thank you!     Thank you for choosing [facility] [place]  for your care. Our goal is always to provide you with excellent care. Hearing back from our patients is one way we can continue to improve our services. Please take a few minutes to complete the written survey that you may receive in the mail after your visit with us. Thank you!             Your Updated Medication List - Protect others around you: Learn how to safely use, store and throw away your medicines at www.disposemymeds.org.          This list is accurate as of: 5/16/17 10:22 AM.  Always use your most recent med list.                   Brand Name Dispense Instructions for use    acetaminophen 500 MG tablet    TYLENOL    60 tablet    Take 2 tablets (1,000 mg) by mouth 3 times daily       albuterol (2.5 MG/3ML) 0.083% neb solution     360 mL    Take 1 vial (2.5 mg) by nebulization every 6 hours as needed for shortness of breath / dyspnea or wheezing       ascorbic acid 500 MG Tabs     30 tablet    Take 1 tablet (500 mg) by mouth daily       aspirin  MG EC tablet     40 tablet    Take 325 mg by mouth daily       bisacodyl 10 MG Suppository    DULCOLAX    30 suppository    Place 1 suppository (10 mg) rectally daily as needed for constipation       CELEXA PO      Take 10 mg by mouth daily       furosemide 40 MG tablet    LASIX    30 tablet    Take 1 tablet (40 mg) by mouth daily       hypromellose 0.5 % Soln ophthalmic solution    ARTIFICIAL TEARS     Place 1 drop into both eyes 3 times daily       lactulose 10 GM/15ML solution    CHRONULAC     Take 30 mLs by mouth daily as needed for constipation       levothyroxine 75 MCG tablet    SYNTHROID/LEVOTHROID    90 tablet    Take 1 tablet (75 mcg) by mouth daily       lisinopril 5 MG tablet    PRINIVIL/ZESTRIL    30 tablet    Take 1 tablet (5 mg) by  mouth 2 times daily       LORazepam 1 MG tablet    ATIVAN    30 tablet    Take 0.5 tablets (0.5 mg) by mouth daily as needed for anxiety       magnesium oxide 400 MG tablet    MAG-OX    60 tablet    Take 1 tablet (400 mg) by mouth daily       METOPROLOL SUCCINATE ER PO      Take 12.5 mg by mouth At Bedtime       multivitamin  with lutein Caps per capsule      Take 1 capsule by mouth daily       NEURONTIN PO      Take 200 mg by mouth daily       omeprazole 20 MG tablet     30 tablet    Take 1 tablet (20 mg) by mouth 2 times daily       oxyCODONE 5 MG IR tablet    ROXICODONE    40 tablet    Take 1 tablet (5 mg) by mouth every 8 hours as needed for moderate to severe pain       polyethylene glycol Packet    MIRALAX/GLYCOLAX    7 packet    Take 17 g by mouth daily       potassium chloride SA 20 MEQ CR tablet    K-DUR/KLOR-CON M    60 tablet    Take 1 tablet (20 mEq) by mouth daily       senna-docusate 8.6-50 MG per tablet    SENOKOT-S;PERICOLACE    100 tablet    Take 2 tablets by mouth 2 times daily       SIMETHICONE-80 PO      Take 80 mg by mouth 2 times daily as needed for intestinal gas in addition to scheduled doses       ZOFRAN PO      Take 4 mg by mouth every 6 hours as needed for nausea or vomiting

## 2017-08-07 ENCOUNTER — DOCUMENTATION ONLY (OUTPATIENT)
Dept: CARDIOLOGY | Facility: CLINIC | Age: 82
End: 2017-08-07

## 2017-08-07 NOTE — PROGRESS NOTES
[employer] Insignia I Ultra--Rep Interrogation at Nursing Home  AP: 0 % : 8 %  Mode: VVIR  bpm        Underlying Rhythm: Chronic AF with variable VR  Heart Rate: Heart rate stable with good variability.  Sensing: WNL    Pacing Threshold: WNL   Impedance: WNL  Battery Status: Approximately 3 years longevity  Atrial Arrhythmia: Chronic AF, Patient is on ASA only due to subdural hematoma.   Ventricular Arrhythmia: 1 episode logged, EGM showed 12 beats of NSVT rate of 210 bpm  Setting Change: 0    Care Plan: Follow up with Q 3 month phone teletrace. Nursing home called of the date and time of phone teletrace. KOSTA Menjivar RN

## 2017-08-08 ENCOUNTER — HOSPITAL ENCOUNTER (OUTPATIENT)
Dept: CT IMAGING | Facility: CLINIC | Age: 82
Discharge: HOME | End: 2017-08-08
Attending: INTERNAL MEDICINE | Admitting: INTERNAL MEDICINE
Payer: COMMERCIAL

## 2017-08-08 DIAGNOSIS — S06.37AA: ICD-10-CM

## 2017-08-08 DIAGNOSIS — R51.9 CHRONIC RIGHT-SIDED HEADACHES: ICD-10-CM

## 2017-08-08 DIAGNOSIS — G89.29 CHRONIC RIGHT-SIDED HEADACHES: ICD-10-CM

## 2017-08-08 PROCEDURE — 70450 CT HEAD/BRAIN W/O DYE: CPT

## 2017-11-08 ENCOUNTER — ALLIED HEALTH/NURSE VISIT (OUTPATIENT)
Dept: CARDIOLOGY | Facility: CLINIC | Age: 82
End: 2017-11-08
Payer: COMMERCIAL

## 2017-11-08 DIAGNOSIS — Z95.0 CARDIAC PACEMAKER IN SITU: Primary | ICD-10-CM

## 2017-11-08 PROCEDURE — 93293 PM PHONE R-STRIP DEVICE EVAL: CPT | Performed by: INTERNAL MEDICINE

## 2017-11-08 NOTE — MR AVS SNAPSHOT
After Visit Summary   11/8/2017    Ankita Pham    MRN: [id number]           Patient Information     Date Of Birth          8/3/1921        Visit Information        Provider Department      11/8/2017 10:30 AM MEKA BATRES [facility]        Today's Diagnoses     Cardiac pacemaker in situ    -  1       Follow-ups after your visit        Your next 10 appointments already scheduled     Feb 13, 2018 10:30 AM CST   Phone Device Check with ACKERMAN TECHJuanito   [facility]   [place] ([facility])    [address] [place], [place] [address]  [facility] [address] [phone]              Who to contact     If you have questions or need follow up information about today's clinic visit or your schedule please contact [facility] directly at [phone].  Normal or non-critical lab and imaging results will be communicated to you by Greatisthart, letter or phone within 4 business days after the clinic has received the results. If you do not hear from us within 7 days, please contact the clinic through Greatisthart or phone. If you have a critical or abnormal lab result, we will notify you by phone as soon as possible.  Submit refill requests through [employer] or call your pharmacy and they will forward the refill request to us. Please allow 3 business days for your refill to be completed.          Additional Information About Your Visit        MyChart Information     [employer] gives you secure access to your electronic health record. If you see a primary care provider, you can also send messages to your care team and make appointments. If you have questions, please call your primary care clinic.  If you do not have a primary care provider, please call [phone] and they will assist you.        Care EveryWhere ID     This is your Care EveryWhere ID. This could be used by other organizations to access your [facility]  records  [id number]         Blood Pressure from Last 3 Encounters:   03/06/17 112/89   08/30/16 118/62   07/17/16 127/61    Weight from Last 3 Encounters:   03/04/17 84.8 kg (187 lb)   07/15/16 84.3 kg (185 lb 13.6 oz)   03/05/15 72.1 kg (159 lb)              We Performed the Following     PM PHONE R STRIP EVAL UP TO 90 DAYS ([id number])        Primary Care Provider    None Specified       No primary provider on file.        Equal Access to Services     [facility] : Hadii francoise martin hadimmanuelo Soliliana, waaxda luqadaha, qaybta kaalmada mary lou, wander dye . So [facility] [phone].    ATENCIÓN: Si habla español, tiene a guillen disposición servicios gratuitos de asistencia lingüística. Llame al [phone].    We comply with applicable federal civil rights laws and [place] laws. We do not discriminate on the basis of race, color, national origin, age, disability, sex, sexual orientation, or gender identity.            Thank you!     Thank you for choosing [facility]  for your care. Our goal is always to provide you with excellent care. Hearing back from our patients is one way we can continue to improve our services. Please take a few minutes to complete the written survey that you may receive in the mail after your visit with us. Thank you!             Your Updated Medication List - Protect others around you: Learn how to safely use, store and throw away your medicines at www.disposemymeds.org.          This list is accurate as of: 11/8/17 10:55 AM.  Always use your most recent med list.                   Brand Name Dispense Instructions for use Diagnosis    acetaminophen 500 MG tablet    TYLENOL    60 tablet    Take 2 tablets (1,000 mg) by mouth 3 times daily    Chronic pain syndrome       albuterol (2.5 MG/3ML) 0.083% neb solution     360 mL    Take 1 vial (2.5 mg) by nebulization every 6 hours as needed for shortness of breath / dyspnea or wheezing     Shortness of breath       ascorbic acid 500 MG Tabs     30 tablet    Take 1 tablet (500 mg) by mouth daily        aspirin  MG EC tablet     40 tablet    Take 325 mg by mouth daily    Other specified cardiac dysrhythmias(427.89)       bisacodyl 10 MG Suppository    DULCOLAX    30 suppository    Place 1 suppository (10 mg) rectally daily as needed for constipation    Ileus (H)       CELEXA PO      Take 10 mg by mouth daily        furosemide 40 MG tablet    LASIX    30 tablet    Take 1 tablet (40 mg) by mouth daily    Cardiac pacemaker in situ       hypromellose 0.5 % Soln ophthalmic solution    ARTIFICIAL TEARS     Place 1 drop into both eyes 3 times daily        lactulose 10 GM/15ML solution    CHRONULAC     Take 30 mLs by mouth daily as needed for constipation        levothyroxine 75 MCG tablet    SYNTHROID/LEVOTHROID    90 tablet    Take 1 tablet (75 mcg) by mouth daily    Unspecified hypothyroidism       lisinopril 5 MG tablet    PRINIVIL/ZESTRIL    30 tablet    Take 1 tablet (5 mg) by mouth 2 times daily    Hypertension goal BP (blood pressure) < 140/90       LORazepam 1 MG tablet    ATIVAN    30 tablet    Take 0.5 tablets (0.5 mg) by mouth daily as needed for anxiety    Delirium       magnesium oxide 400 MG tablet    MAG-OX    60 tablet    Take 1 tablet (400 mg) by mouth daily    Ileus (H)       METOPROLOL SUCCINATE ER PO      Take 12.5 mg by mouth At Bedtime        multivitamin  with lutein Caps per capsule      Take 1 capsule by mouth daily    Physical deconditioning       NEURONTIN PO      Take 200 mg by mouth daily        omeprazole 20 MG tablet     30 tablet    Take 1 tablet (20 mg) by mouth 2 times daily    Abdominal pain, other specified site       oxyCODONE IR 5 MG tablet    ROXICODONE    40 tablet    Take 1 tablet (5 mg) by mouth every 8 hours as needed for moderate to severe pain    Epidural hematoma (H)       polyethylene glycol Packet    MIRALAX/GLYCOLAX    7 packet    Take 17 g by mouth daily     Slow transit constipation       potassium chloride SA 20 MEQ CR tablet    K-DUR/KLOR-CON M    60 tablet    Take 1 tablet (20 mEq) by mouth daily    Ileus (H)       senna-docusate 8.6-50 MG per tablet    SENOKOT-S;PERICOLACE    100 tablet    Take 2 tablets by mouth 2 times daily    Slow transit constipation       SIMETHICONE-80 PO      Take 80 mg by mouth 2 times daily as needed for intestinal gas in addition to scheduled doses        ZOFRAN PO      Take 4 mg by mouth every 6 hours as needed for nausea or vomiting

## 2018-02-12 ENCOUNTER — RECORDS - HEALTHEAST (OUTPATIENT)
Dept: LAB | Facility: CLINIC | Age: 83
End: 2018-02-12

## 2018-02-12 LAB
ANION GAP SERPL CALCULATED.3IONS-SCNC: 5 MMOL/L (ref 5–18)
BUN SERPL-MCNC: 19 MG/DL (ref 8–28)
CALCIUM SERPL-MCNC: 8.7 MG/DL (ref 8.5–10.5)
CHLORIDE BLD-SCNC: 99 MMOL/L (ref 98–107)
CO2 SERPL-SCNC: 36 MMOL/L (ref 22–31)
CREAT SERPL-MCNC: 0.6 MG/DL (ref 0.6–1.1)
GFR SERPL CREATININE-BSD FRML MDRD: >60 ML/MIN/1.73M2
GLUCOSE BLD-MCNC: 95 MG/DL (ref 70–125)
MAGNESIUM SERPL-MCNC: 1.7 MG/DL (ref 1.8–2.6)
POTASSIUM BLD-SCNC: 4.1 MMOL/L (ref 3.5–5)
SODIUM SERPL-SCNC: 140 MMOL/L (ref 136–145)
TSH SERPL DL<=0.005 MIU/L-ACNC: 0.54 UIU/ML (ref 0.3–5)

## 2018-02-14 ENCOUNTER — ALLIED HEALTH/NURSE VISIT (OUTPATIENT)
Dept: CARDIOLOGY | Facility: CLINIC | Age: 83
End: 2018-02-14
Payer: COMMERCIAL

## 2018-02-14 DIAGNOSIS — Z95.0 CARDIAC PACEMAKER IN SITU: Primary | ICD-10-CM

## 2018-02-14 PROCEDURE — 93293 PM PHONE R-STRIP DEVICE EVAL: CPT | Performed by: INTERNAL MEDICINE

## 2018-02-14 NOTE — MR AVS SNAPSHOT
After Visit Summary   2/14/2018    Ankita Pham    MRN: [id number]           Patient Information     Date Of Birth          8/3/1921        Visit Information        Provider Department      2/14/2018 10:00 AM ACKERMAN TECH1 [facility]        Today's Diagnoses     Cardiac pacemaker in situ    -  1       Follow-ups after your visit        Your next 10 appointments already scheduled     May 17, 2018 10:00 AM CDT   Phone Device Check with ACKERMAN TECH2   [facility]   [place] ([facility])    [address] [place], [place] [address]  [facility] [address] [phone]              Who to contact     If you have questions or need follow up information about today's clinic visit or your schedule please contact [facility] directly at [phone].  Normal or non-critical lab and imaging results will be communicated to you by Swarmforcehart, letter or phone within 4 business days after the clinic has received the results. If you do not hear from us within 7 days, please contact the clinic through Swarmforcehart or phone. If you have a critical or abnormal lab result, we will notify you by phone as soon as possible.  Submit refill requests through Dot or call your pharmacy and they will forward the refill request to us. Please allow 3 business days for your refill to be completed.          Additional Information About Your Visit        MyChart Information     Dot gives you secure access to your electronic health record. If you see a primary care provider, you can also send messages to your care team and make appointments. If you have questions, please call your primary care clinic.  If you do not have a primary care provider, please call [phone] and they will assist you.        Care EveryWhere ID     This is your Care EveryWhere ID. This could be used by other organizations to access your [facility]  records  [id number]         Blood Pressure from Last 3 Encounters:   03/06/17 112/89   08/30/16 118/62   07/17/16 127/61    Weight from Last 3 Encounters:   03/04/17 84.8 kg (187 lb)   07/15/16 84.3 kg (185 lb 13.6 oz)   03/05/15 72.1 kg (159 lb)              We Performed the Following     PM PHONE R STRIP EVAL UP TO 90 DAYS ([id number])        Primary Care Provider    None Specified       No primary provider on file.        Equal Access to Services     [facility] Hospital[facility] : Hadii francoise martin hadimmanuelo Soliliana, waaxda luqadaha, qaybta kaalmada mary lou, wander dye . So [facility] [phone].    ATENCIÓN: Si habla español, tiene a guillen disposición servicios gratuitos de asistencia lingüística. Llame al [phone].    We comply with applicable federal civil rights laws and [place] laws. We do not discriminate on the basis of race, color, national origin, age, disability, sex, sexual orientation, or gender identity.            Thank you!     Thank you for choosing [facility]  for your care. Our goal is always to provide you with excellent care. Hearing back from our patients is one way we can continue to improve our services. Please take a few minutes to complete the written survey that you may receive in the mail after your visit with us. Thank you!             Your Updated Medication List - Protect others around you: Learn how to safely use, store and throw away your medicines at www.disposemymeds.org.          This list is accurate as of 2/14/18 10:04 AM.  Always use your most recent med list.                   Brand Name Dispense Instructions for use Diagnosis    acetaminophen 500 MG tablet    TYLENOL    60 tablet    Take 2 tablets (1,000 mg) by mouth 3 times daily    Chronic pain syndrome       albuterol (2.5 MG/3ML) 0.083% neb solution     360 mL    Take 1 vial (2.5 mg) by nebulization every 6 hours as needed for shortness of breath / dyspnea or wheezing     Shortness of breath       ascorbic acid 500 MG Tabs     30 tablet    Take 1 tablet (500 mg) by mouth daily        aspirin  MG EC tablet     40 tablet    Take 325 mg by mouth daily    Other specified cardiac dysrhythmias(427.89)       bisacodyl 10 MG Suppository    DULCOLAX    30 suppository    Place 1 suppository (10 mg) rectally daily as needed for constipation    Ileus (H)       CELEXA PO      Take 10 mg by mouth daily        furosemide 40 MG tablet    LASIX    30 tablet    Take 1 tablet (40 mg) by mouth daily    Cardiac pacemaker in situ       hypromellose 0.5 % Soln ophthalmic solution    ARTIFICIAL TEARS     Place 1 drop into both eyes 3 times daily        lactulose 10 GM/15ML solution    CHRONULAC     Take 30 mLs by mouth daily as needed for constipation        levothyroxine 75 MCG tablet    SYNTHROID/LEVOTHROID    90 tablet    Take 1 tablet (75 mcg) by mouth daily    Unspecified hypothyroidism       lisinopril 5 MG tablet    PRINIVIL/ZESTRIL    30 tablet    Take 1 tablet (5 mg) by mouth 2 times daily    Hypertension goal BP (blood pressure) < 140/90       LORazepam 1 MG tablet    ATIVAN    30 tablet    Take 0.5 tablets (0.5 mg) by mouth daily as needed for anxiety    Delirium       magnesium oxide 400 MG tablet    MAG-OX    60 tablet    Take 1 tablet (400 mg) by mouth daily    Ileus (H)       METOPROLOL SUCCINATE ER PO      Take 12.5 mg by mouth At Bedtime        multivitamin  with lutein Caps per capsule      Take 1 capsule by mouth daily    Physical deconditioning       NEURONTIN PO      Take 200 mg by mouth daily        omeprazole 20 MG tablet     30 tablet    Take 1 tablet (20 mg) by mouth 2 times daily    Abdominal pain, other specified site       oxyCODONE IR 5 MG tablet    ROXICODONE    40 tablet    Take 1 tablet (5 mg) by mouth every 8 hours as needed for moderate to severe pain    Epidural hematoma (H)       polyethylene glycol Packet    MIRALAX/GLYCOLAX    7 packet    Take 17 g by mouth daily     Slow transit constipation       potassium chloride SA 20 MEQ CR tablet    K-DUR/KLOR-CON M    60 tablet    Take 1 tablet (20 mEq) by mouth daily    Ileus (H)       senna-docusate 8.6-50 MG per tablet    SENOKOT-S;PERICOLACE    100 tablet    Take 2 tablets by mouth 2 times daily    Slow transit constipation       SIMETHICONE-80 PO      Take 80 mg by mouth 2 times daily as needed for intestinal gas in addition to scheduled doses        ZOFRAN PO      Take 4 mg by mouth every 6 hours as needed for nausea or vomiting

## 2018-04-06 ENCOUNTER — RECORDS - HEALTHEAST (OUTPATIENT)
Dept: LAB | Facility: CLINIC | Age: 83
End: 2018-04-06

## 2018-04-09 LAB — TSH SERPL DL<=0.005 MIU/L-ACNC: 0.94 UIU/ML (ref 0.3–5)

## 2018-05-17 ENCOUNTER — ALLIED HEALTH/NURSE VISIT (OUTPATIENT)
Dept: CARDIOLOGY | Facility: CLINIC | Age: 83
End: 2018-05-17
Payer: COMMERCIAL

## 2018-05-17 DIAGNOSIS — Z95.0 CARDIAC PACEMAKER IN SITU: Primary | ICD-10-CM

## 2018-05-17 PROCEDURE — 93293 PM PHONE R-STRIP DEVICE EVAL: CPT | Performed by: INTERNAL MEDICINE

## 2018-05-17 NOTE — NURSING NOTE
~90 day phone teletrace  Intrinsic Rhythm at time of check. Magnet response WNL.  F/U scheduled q 3 months.

## 2018-05-17 NOTE — MR AVS SNAPSHOT
After Visit Summary   5/17/2018    Ankita Pham    MRN: [id number]           Patient Information     Date Of Birth          8/3/1921        Visit Information        Provider Department      5/17/2018 10:00 AM MEKA FOY [facility]        Today's Diagnoses     Cardiac pacemaker in situ    -  1       Follow-ups after your visit        Your next 10 appointments already scheduled     Aug 23, 2018 10:00 AM CDT   Phone Device Check with MEKA FOY   [facility]   [place] ([facility])    [address] [place], [place] [id number]  [facility] [address] [phone] OPT 2              Who to contact     If you have questions or need follow up information about today's clinic visit or your schedule please contact [facility] directly at [phone].  Normal or non-critical lab and imaging results will be communicated to you by Tivityhart, letter or phone within 4 business days after the clinic has received the results. If you do not hear from us within 7 days, please contact the clinic through Tivityhart or phone. If you have a critical or abnormal lab result, we will notify you by phone as soon as possible.  Submit refill requests through ShareDesk or call your pharmacy and they will forward the refill request to us. Please allow 3 business days for your refill to be completed.          Additional Information About Your Visit        MyChart Information     ShareDesk gives you secure access to your electronic health record. If you see a primary care provider, you can also send messages to your care team and make appointments. If you have questions, please call your primary care clinic.  If you do not have a primary care provider, please call [phone] and they will assist you.        Care EveryWhere ID     This is your Care EveryWhere ID. This could be used by other organizations to access your [facility]  records  [id number]         Blood Pressure from Last 3 Encounters:   03/06/17 112/89   08/30/16 118/62   07/17/16 127/61    Weight from Last 3 Encounters:   03/04/17 84.8 kg (187 lb)   07/15/16 84.3 kg (185 lb 13.6 oz)   03/05/15 72.1 kg (159 lb)              We Performed the Following     PM PHONE R STRIP EVAL UP TO 90 DAYS ([id number])        Primary Care Provider    None Specified       No primary provider on file.        Equal Access to Services     [facility] : Hadii francoise martin hadimmanuelo Soliliana, waaxda luqadaha, qaybta kaalmada mary lou, wander dye . So [facility] [phone].    ATENCIÓN: Si habla español, tiene a guillen disposición servicios gratuitos de asistencia lingüística. Llame al [phone].    We comply with applicable federal civil rights laws and [place] laws. We do not discriminate on the basis of race, color, national origin, age, disability, sex, sexual orientation, or gender identity.            Thank you!     Thank you for choosing [facility]  for your care. Our goal is always to provide you with excellent care. Hearing back from our patients is one way we can continue to improve our services. Please take a few minutes to complete the written survey that you may receive in the mail after your visit with us. Thank you!             Your Updated Medication List - Protect others around you: Learn how to safely use, store and throw away your medicines at www.disposemymeds.org.          This list is accurate as of 5/17/18 10:15 AM.  Always use your most recent med list.                   Brand Name Dispense Instructions for use Diagnosis    acetaminophen 500 MG tablet    TYLENOL    60 tablet    Take 2 tablets (1,000 mg) by mouth 3 times daily    Chronic pain syndrome       albuterol (2.5 MG/3ML) 0.083% neb solution     360 mL    Take 1 vial (2.5 mg) by nebulization every 6 hours as needed for shortness of breath / dyspnea or wheezing     Shortness of breath       ascorbic acid 500 MG Tabs     30 tablet    Take 1 tablet (500 mg) by mouth daily        aspirin 325 MG EC tablet     40 tablet    Take 325 mg by mouth daily    Other specified cardiac dysrhythmias(427.89)       bisacodyl 10 MG Suppository    DULCOLAX    30 suppository    Place 1 suppository (10 mg) rectally daily as needed for constipation    Ileus (H)       CELEXA PO      Take 10 mg by mouth daily        furosemide 40 MG tablet    LASIX    30 tablet    Take 1 tablet (40 mg) by mouth daily    Cardiac pacemaker in situ       hypromellose 0.5 % Soln ophthalmic solution    ARTIFICIAL TEARS     Place 1 drop into both eyes 3 times daily        lactulose 10 GM/15ML solution    CHRONULAC     Take 30 mLs by mouth daily as needed for constipation        levothyroxine 75 MCG tablet    SYNTHROID/LEVOTHROID    90 tablet    Take 1 tablet (75 mcg) by mouth daily    Unspecified hypothyroidism       lisinopril 5 MG tablet    PRINIVIL/ZESTRIL    30 tablet    Take 1 tablet (5 mg) by mouth 2 times daily    Hypertension goal BP (blood pressure) < 140/90       LORazepam 1 MG tablet    ATIVAN    30 tablet    Take 0.5 tablets (0.5 mg) by mouth daily as needed for anxiety    Delirium       magnesium oxide 400 MG tablet    MAG-OX    60 tablet    Take 1 tablet (400 mg) by mouth daily    Ileus (H)       METOPROLOL SUCCINATE ER PO      Take 12.5 mg by mouth At Bedtime        multivitamin  with lutein Caps per capsule      Take 1 capsule by mouth daily    Physical deconditioning       NEURONTIN PO      Take 200 mg by mouth daily        omeprazole 20 MG tablet     30 tablet    Take 1 tablet (20 mg) by mouth 2 times daily    Abdominal pain, other specified site       oxyCODONE IR 5 MG tablet    ROXICODONE    40 tablet    Take 1 tablet (5 mg) by mouth every 8 hours as needed for moderate to severe pain    Epidural hematoma (H)       polyethylene glycol Packet    MIRALAX/GLYCOLAX    7 packet    Take 17 g by mouth daily     Slow transit constipation       potassium chloride SA 20 MEQ CR tablet    K-DUR/KLOR-CON M    60 tablet    Take 1 tablet (20 mEq) by mouth daily    Ileus (H)       senna-docusate 8.6-50 MG per tablet    SENOKOT-S;PERICOLACE    100 tablet    Take 2 tablets by mouth 2 times daily    Slow transit constipation       SIMETHICONE-80 PO      Take 80 mg by mouth 2 times daily as needed for intestinal gas in addition to scheduled doses        ZOFRAN PO      Take 4 mg by mouth every 6 hours as needed for nausea or vomiting

## 2018-08-23 ENCOUNTER — ALLIED HEALTH/NURSE VISIT (OUTPATIENT)
Dept: CARDIOLOGY | Facility: CLINIC | Age: 83
End: 2018-08-23
Payer: COMMERCIAL

## 2018-08-23 DIAGNOSIS — Z95.0 CARDIAC PACEMAKER IN SITU: Primary | ICD-10-CM

## 2018-08-23 PROCEDURE — 93293 PM PHONE R-STRIP DEVICE EVAL: CPT | Performed by: INTERNAL MEDICINE

## 2018-08-23 NOTE — MR AVS SNAPSHOT
After Visit Summary   8/23/2018    Ankita Pham    MRN: [id number]           Patient Information     Date Of Birth          8/3/1921        Visit Information        Provider Department      8/23/2018 10:00 AM ACKERMAN TECH2 [facility]        Today's Diagnoses     Cardiac pacemaker in situ    -  1       Follow-ups after your visit        Additional Services     Follow-Up with Device Clinic                 Future tests that were ordered for you today     Open Future Orders        Priority Expected Expires Ordered    Follow-Up with Device Clinic Routine 11/29/2018 8/23/2019 8/23/2018            Who to contact     If you have questions or need follow up information about today's clinic visit or your schedule please contact [facility]   [facility] directly at [phone].  Normal or non-critical lab and imaging results will be communicated to you by Virtuixhart, letter or phone within 4 business days after the clinic has received the results. If you do not hear from us within 7 days, please contact the clinic through Virtuixhart or phone. If you have a critical or abnormal lab result, we will notify you by phone as soon as possible.  Submit refill requests through [employer] or call your pharmacy and they will forward the refill request to us. Please allow 3 business days for your refill to be completed.          Additional Information About Your Visit        MyChart Information     [employer] gives you secure access to your electronic health record. If you see a primary care provider, you can also send messages to your care team and make appointments. If you have questions, please call your primary care clinic.  If you do not have a primary care provider, please call [phone] and they will assist you.        Care EveryWhere ID     This is your Care EveryWhere ID. This could be used by other organizations to access your [facility]  records  [id number]         Blood Pressure from Last 3 Encounters:   03/06/17 112/89   08/30/16 118/62   07/17/16 127/61    Weight from Last 3 Encounters:   03/04/17 84.8 kg (187 lb)   07/15/16 84.3 kg (185 lb 13.6 oz)   03/05/15 72.1 kg (159 lb)              We Performed the Following     PM PHONE R STRIP EVAL UP TO 90 DAYS ([id number])        Primary Care Provider    None Specified       No primary provider on file.        Equal Access to Services     HOWARD [facility] : Hadii francoise martin hadimmanuelo Soliliana, waaxda luqadaha, qaybta kaalmada mary lou, wander dye . So [facility] [phone].    ATENCIÓN: Si habla español, tiene a guillen disposición servicios gratuitos de asistencia lingüística. Llame al [phone].    We comply with applicable federal civil rights laws and [place] laws. We do not discriminate on the basis of race, color, national origin, age, disability, sex, sexual orientation, or gender identity.            Thank you!     Thank you for choosing [facility]  for your care. Our goal is always to provide you with excellent care. Hearing back from our patients is one way we can continue to improve our services. Please take a few minutes to complete the written survey that you may receive in the mail after your visit with us. Thank you!             Your Updated Medication List - Protect others around you: Learn how to safely use, store and throw away your medicines at www.disposemymeds.org.          This list is accurate as of 8/23/18 10:10 AM.  Always use your most recent med list.                   Brand Name Dispense Instructions for use Diagnosis    acetaminophen 500 MG tablet    TYLENOL    60 tablet    Take 2 tablets (1,000 mg) by mouth 3 times daily    Chronic pain syndrome       albuterol (2.5 MG/3ML) 0.083% neb solution     360 mL    Take 1 vial (2.5 mg) by nebulization every 6 hours as needed for shortness of breath / dyspnea or wheezing     Shortness of breath       ascorbic acid 500 MG Tabs     30 tablet    Take 1 tablet (500 mg) by mouth daily        aspirin 325 MG EC tablet     40 tablet    Take 325 mg by mouth daily    Other specified cardiac dysrhythmias(427.89)       bisacodyl 10 MG Suppository    DULCOLAX    30 suppository    Place 1 suppository (10 mg) rectally daily as needed for constipation    Ileus (H)       CELEXA PO      Take 10 mg by mouth daily        furosemide 40 MG tablet    LASIX    30 tablet    Take 1 tablet (40 mg) by mouth daily    Cardiac pacemaker in situ       hypromellose 0.5 % Soln ophthalmic solution    ARTIFICIAL TEARS     Place 1 drop into both eyes 3 times daily        lactulose 10 GM/15ML solution    CHRONULAC     Take 30 mLs by mouth daily as needed for constipation        levothyroxine 75 MCG tablet    SYNTHROID/LEVOTHROID    90 tablet    Take 1 tablet (75 mcg) by mouth daily    Unspecified hypothyroidism       lisinopril 5 MG tablet    PRINIVIL/ZESTRIL    30 tablet    Take 1 tablet (5 mg) by mouth 2 times daily    Hypertension goal BP (blood pressure) < 140/90       LORazepam 1 MG tablet    ATIVAN    30 tablet    Take 0.5 tablets (0.5 mg) by mouth daily as needed for anxiety    Delirium       magnesium oxide 400 MG tablet    MAG-OX    60 tablet    Take 1 tablet (400 mg) by mouth daily    Ileus (H)       METOPROLOL SUCCINATE ER PO      Take 12.5 mg by mouth At Bedtime        multivitamin  with lutein Caps per capsule      Take 1 capsule by mouth daily    Physical deconditioning       NEURONTIN PO      Take 200 mg by mouth daily        omeprazole 20 MG tablet     30 tablet    Take 1 tablet (20 mg) by mouth 2 times daily    Abdominal pain, other specified site       oxyCODONE IR 5 MG tablet    ROXICODONE    40 tablet    Take 1 tablet (5 mg) by mouth every 8 hours as needed for moderate to severe pain    Epidural hematoma (H)       polyethylene glycol Packet    MIRALAX/GLYCOLAX    7 packet    Take 17 g by mouth daily     Slow transit constipation       potassium chloride SA 20 MEQ CR tablet    K-DUR/KLOR-CON M    60 tablet    Take 1 tablet (20 mEq) by mouth daily    Ileus (H)       senna-docusate 8.6-50 MG per tablet    SENOKOT-S;PERICOLACE    100 tablet    Take 2 tablets by mouth 2 times daily    Slow transit constipation       SIMETHICONE-80 PO      Take 80 mg by mouth 2 times daily as needed for intestinal gas in addition to scheduled doses        ZOFRAN PO      Take 4 mg by mouth every 6 hours as needed for nausea or vomiting

## 2018-08-23 NOTE — PROGRESS NOTES
~90 day phone teletrace  Intrinsic Rhythm at time of check. Magnet response WNL.  F/U scheduled q 3 months. Order placed for [employer] Rep to do Threshold at NH.

## 2018-11-09 ENCOUNTER — DOCUMENTATION ONLY (OUTPATIENT)
Dept: CARDIOLOGY | Facility: CLINIC | Age: 83
End: 2018-11-09

## 2018-11-09 NOTE — PROGRESS NOTES
[employer] Ultra Pacemaker Device Check per rep at nursing home    : 15 %  Mode:  VVIR    Underlying Rhythm: Afib 60s  Heart Rate: Stable with adequate variability   Sensing: WNL   Pacing Threshold: WNL   Impedance: WNL  Battery Status: 2.5 years  Device Site: Not assessed  Atrial Arrhythmia: Chronic AF, only on ASA due to subdural hematoma.   Ventricular Arrhythmia: 0  Setting Change: 0    Care Plan: Follow up with Q3 month phone teletrace.   Lalitha Marcos RN

## 2019-02-08 ENCOUNTER — RECORDS - HEALTHEAST (OUTPATIENT)
Dept: LAB | Facility: CLINIC | Age: 84
End: 2019-02-08

## 2019-02-08 LAB
ANION GAP SERPL CALCULATED.3IONS-SCNC: 5 MMOL/L (ref 5–18)
BUN SERPL-MCNC: 17 MG/DL (ref 8–28)
CALCIUM SERPL-MCNC: 8.6 MG/DL (ref 8.5–10.5)
CHLORIDE BLD-SCNC: 100 MMOL/L (ref 98–107)
CO2 SERPL-SCNC: 37 MMOL/L (ref 22–31)
CREAT SERPL-MCNC: 0.68 MG/DL (ref 0.6–1.1)
GFR SERPL CREATININE-BSD FRML MDRD: >60 ML/MIN/1.73M2
GLUCOSE BLD-MCNC: 83 MG/DL (ref 70–125)
MAGNESIUM SERPL-MCNC: 2 MG/DL (ref 1.8–2.6)
POTASSIUM BLD-SCNC: 3.6 MMOL/L (ref 3.5–5)
SODIUM SERPL-SCNC: 142 MMOL/L (ref 136–145)
TSH SERPL DL<=0.005 MIU/L-ACNC: 0.72 UIU/ML (ref 0.3–5)

## 2019-02-19 ENCOUNTER — ANCILLARY PROCEDURE (OUTPATIENT)
Dept: CARDIOLOGY | Facility: CLINIC | Age: 84
End: 2019-02-19
Attending: INTERNAL MEDICINE
Payer: COMMERCIAL

## 2019-02-19 DIAGNOSIS — I49.5 SINUS NODE DYSFUNCTION (H): ICD-10-CM

## 2019-02-19 PROCEDURE — 93293 PM PHONE R-STRIP DEVICE EVAL: CPT | Performed by: INTERNAL MEDICINE

## 2019-02-20 LAB
MDC_IDC_LEAD_IMPLANT_DT: NORMAL
MDC_IDC_LEAD_IMPLANT_DT: NORMAL
MDC_IDC_LEAD_LOCATION: NORMAL
MDC_IDC_LEAD_LOCATION: NORMAL
MDC_IDC_LEAD_MFG: NORMAL
MDC_IDC_LEAD_MFG: NORMAL
MDC_IDC_LEAD_MODEL: NORMAL
MDC_IDC_LEAD_MODEL: NORMAL
MDC_IDC_LEAD_POLARITY_TYPE: NORMAL
MDC_IDC_LEAD_POLARITY_TYPE: NORMAL
MDC_IDC_LEAD_SERIAL: NORMAL
MDC_IDC_LEAD_SERIAL: NORMAL
MDC_IDC_LEAD_SPECIAL_FUNCTION: NORMAL
MDC_IDC_LEAD_SPECIAL_FUNCTION: NORMAL
MDC_IDC_PG_IMPLANT_DTM: NORMAL
MDC_IDC_PG_MFG: NORMAL
MDC_IDC_PG_MODEL: NORMAL
MDC_IDC_PG_SERIAL: NORMAL
MDC_IDC_PG_TYPE: NORMAL
MDC_IDC_SESS_CLINIC_NAME: NORMAL
MDC_IDC_SESS_DTM: NORMAL
MDC_IDC_SESS_TYPE: NORMAL

## 2019-04-18 ENCOUNTER — APPOINTMENT (OUTPATIENT)
Dept: CT IMAGING | Facility: CLINIC | Age: 84
End: 2019-04-18
Attending: EMERGENCY MEDICINE
Payer: COMMERCIAL

## 2019-04-18 ENCOUNTER — HOSPITAL ENCOUNTER (EMERGENCY)
Facility: CLINIC | Age: 84
Discharge: NURSING, REHAB OR LONG-TERM CARE | End: 2019-04-18
Attending: EMERGENCY MEDICINE | Admitting: EMERGENCY MEDICINE
Payer: COMMERCIAL

## 2019-04-18 VITALS
RESPIRATION RATE: 10 BRPM | DIASTOLIC BLOOD PRESSURE: 84 MMHG | TEMPERATURE: 98.4 F | HEART RATE: 84 BPM | SYSTOLIC BLOOD PRESSURE: 102 MMHG | OXYGEN SATURATION: 98 %

## 2019-04-18 DIAGNOSIS — R11.10 VOMITING, INTRACTABILITY OF VOMITING NOT SPECIFIED, PRESENCE OF NAUSEA NOT SPECIFIED, UNSPECIFIED VOMITING TYPE: ICD-10-CM

## 2019-04-18 DIAGNOSIS — I50.9 CONGESTIVE HEART FAILURE, UNSPECIFIED HF CHRONICITY, UNSPECIFIED HEART FAILURE TYPE (H): ICD-10-CM

## 2019-04-18 LAB
ALBUMIN SERPL-MCNC: 2.9 G/DL (ref 3.4–5)
ALP SERPL-CCNC: 69 U/L (ref 40–150)
ALT SERPL W P-5'-P-CCNC: 14 U/L (ref 0–50)
ANION GAP SERPL CALCULATED.3IONS-SCNC: 6 MMOL/L (ref 3–14)
AST SERPL W P-5'-P-CCNC: 14 U/L (ref 0–45)
BASOPHILS # BLD AUTO: 0 10E9/L (ref 0–0.2)
BASOPHILS NFR BLD AUTO: 0.4 %
BILIRUB SERPL-MCNC: 0.5 MG/DL (ref 0.2–1.3)
BUN SERPL-MCNC: 23 MG/DL (ref 7–30)
CALCIUM SERPL-MCNC: 8.3 MG/DL (ref 8.5–10.1)
CHLORIDE SERPL-SCNC: 97 MMOL/L (ref 94–109)
CO2 SERPL-SCNC: 34 MMOL/L (ref 20–32)
CREAT SERPL-MCNC: 0.6 MG/DL (ref 0.52–1.04)
DIFFERENTIAL METHOD BLD: ABNORMAL
EOSINOPHIL # BLD AUTO: 0 10E9/L (ref 0–0.7)
EOSINOPHIL NFR BLD AUTO: 0.2 %
ERYTHROCYTE [DISTWIDTH] IN BLOOD BY AUTOMATED COUNT: 14.6 % (ref 10–15)
GFR SERPL CREATININE-BSD FRML MDRD: 76 ML/MIN/{1.73_M2}
GLUCOSE SERPL-MCNC: 109 MG/DL (ref 70–99)
HCT VFR BLD AUTO: 48.7 % (ref 35–47)
HGB BLD-MCNC: 15.1 G/DL (ref 11.7–15.7)
IMM GRANULOCYTES # BLD: 0 10E9/L (ref 0–0.4)
IMM GRANULOCYTES NFR BLD: 0.4 %
LIPASE SERPL-CCNC: 45 U/L (ref 73–393)
LYMPHOCYTES # BLD AUTO: 0.8 10E9/L (ref 0.8–5.3)
LYMPHOCYTES NFR BLD AUTO: 17.3 %
MCH RBC QN AUTO: 32.7 PG (ref 26.5–33)
MCHC RBC AUTO-ENTMCNC: 31 G/DL (ref 31.5–36.5)
MCV RBC AUTO: 105 FL (ref 78–100)
MONOCYTES # BLD AUTO: 0.4 10E9/L (ref 0–1.3)
MONOCYTES NFR BLD AUTO: 9.3 %
NEUTROPHILS # BLD AUTO: 3.4 10E9/L (ref 1.6–8.3)
NEUTROPHILS NFR BLD AUTO: 72.4 %
NRBC # BLD AUTO: 0 10*3/UL
NRBC BLD AUTO-RTO: 0 /100
NT-PROBNP SERPL-MCNC: 3085 PG/ML (ref 0–1800)
PLATELET # BLD AUTO: 200 10E9/L (ref 150–450)
POTASSIUM SERPL-SCNC: 3.8 MMOL/L (ref 3.4–5.3)
PROT SERPL-MCNC: 6.5 G/DL (ref 6.8–8.8)
RBC # BLD AUTO: 4.62 10E12/L (ref 3.8–5.2)
SODIUM SERPL-SCNC: 137 MMOL/L (ref 133–144)
WBC # BLD AUTO: 4.7 10E9/L (ref 4–11)

## 2019-04-18 PROCEDURE — 80053 COMPREHEN METABOLIC PANEL: CPT | Performed by: EMERGENCY MEDICINE

## 2019-04-18 PROCEDURE — 93308 TTE F-UP OR LMTD: CPT | Mod: 26 | Performed by: EMERGENCY MEDICINE

## 2019-04-18 PROCEDURE — 85025 COMPLETE CBC W/AUTO DIFF WBC: CPT | Performed by: EMERGENCY MEDICINE

## 2019-04-18 PROCEDURE — 25000125 ZZHC RX 250: Performed by: EMERGENCY MEDICINE

## 2019-04-18 PROCEDURE — 93308 TTE F-UP OR LMTD: CPT

## 2019-04-18 PROCEDURE — 71260 CT THORAX DX C+: CPT

## 2019-04-18 PROCEDURE — 83690 ASSAY OF LIPASE: CPT | Performed by: EMERGENCY MEDICINE

## 2019-04-18 PROCEDURE — 83880 ASSAY OF NATRIURETIC PEPTIDE: CPT | Performed by: EMERGENCY MEDICINE

## 2019-04-18 PROCEDURE — 99284 EMERGENCY DEPT VISIT MOD MDM: CPT | Mod: 25 | Performed by: EMERGENCY MEDICINE

## 2019-04-18 PROCEDURE — 99285 EMERGENCY DEPT VISIT HI MDM: CPT | Mod: 25

## 2019-04-18 PROCEDURE — 74177 CT ABD & PELVIS W/CONTRAST: CPT

## 2019-04-18 PROCEDURE — 25000128 H RX IP 250 OP 636: Performed by: EMERGENCY MEDICINE

## 2019-04-18 RX ORDER — IOPAMIDOL 755 MG/ML
115 INJECTION, SOLUTION INTRAVASCULAR ONCE
Status: COMPLETED | OUTPATIENT
Start: 2019-04-18 | End: 2019-04-18

## 2019-04-18 RX ADMIN — SODIUM CHLORIDE, PRESERVATIVE FREE 80 ML: 5 INJECTION INTRAVENOUS at 02:41

## 2019-04-18 RX ADMIN — IOPAMIDOL 115 ML: 755 INJECTION, SOLUTION INTRAVENOUS at 02:40

## 2019-04-18 NOTE — ED NOTES
Bed: ED07  Expected date:   Expected time:   Means of arrival:   Comments:  [facility]. 89F constipation.

## 2019-04-18 NOTE — ED TRIAGE NOTES
"Febrile, abdominal pain, nausea/vomiting yesterday. Xrays completed. Shows moderate stool. Not currently complaining of any symptoms. Per EMS report, staff from nursing home acknowledges she doesn't have any acute symptoms, but \"the on call MD wanted her to be seen here\".   "

## 2019-04-18 NOTE — ED AVS SNAPSHOT
[facility], [place], Emergency Department  [address] [place] [address]  Phone:  [phone]                                    Ankita Pham   MRN: [id number]    Department:  [facility], Emergency Department   Date of Visit:  4/18/2019           After Visit Summary Signature Page    I have received my discharge instructions, and my questions have been answered. I have discussed any challenges I see with this plan with the nurse or doctor.    ..........................................................................................................................................  Patient/Patient Representative Signature      ..........................................................................................................................................  Patient Representative Print Name and Relationship to Patient    ..................................................               ................................................  Date                                   Time    ..........................................................................................................................................  Reviewed by Signature/Title    ...................................................              ..............................................  Date                                               Time          22EPIC Rev 08/18

## 2019-04-18 NOTE — DISCHARGE INSTRUCTIONS
Please make an appointment to follow up with Your Primary Care Provider in 2-4 days.     Return to the ED if you are having chest pain, fever, shortness of breath, or any urgent/life-threatening concerns.

## 2019-04-18 NOTE — ED PROVIDER NOTES
History     Chief Complaint   Patient presents with     Constipation     HPI  Ankita Pham is a 89 year old female with PMH notable for IBS, HTN, GERD, PUD who presents to the ED with recent vomiting and concern for aspiration vs pulmonary edema. Patient's son reports that a few days ago she had a day or so of vomiting and abdominal pain but that those symptoms resolved. He reports she has a chest x-ray at her nursing home and they were worried about aspiration or pulmonary edema based on the images, and thus sent her in for evaluation. Patient reports vomiting stopped 1-2 days ago, not having abdominal pain currently. She has had few BMs recently. She denies fevers. No dysuria, no urgency/frequency. Patient denies shortness of breath, denies cough, denies chest pain/discomfort. She endorses chronic leg swelling, which is currently better than usual, takes furosemide.     I have reviewed the Medications, Allergies, Past Medical and Surgical History, and Social History in the Epic system.    Review of Systems  A complete review of systems was performed with pertinent positives and negatives noted in the HPI, and all other systems negative.     Physical Exam   BP: 120/74  Pulse: 83  Heart Rate: 85  Temp: 98.4  F (36.9  C)  Resp: 16  SpO2: 98 %    Physical Exam  General: no acute distress. Appears stated age.   HENT: MMM, no oropharyngeal lesions  Eyes: PERRL, normal sclerae  Neck: non-tender, supple  Cardio: regular rate. Regular rhythm. Extremities well perfused  Resp: Normal work of breathing, clear breath sounds  Chest/Back: no visual signs of trauma, no CVA tenderness  Abdomen: no tenderness, mildly distended, no rebound, no guarding  Neuro: alert and fully oriented. CN II-XII grossly intact. Grossly normal strength and sensation in all extremities.   MSK: no deformities.   Integumentary/Skin: no rash visualized, normal color. Legs with trace symmetrical shin edema.   Psych: normal affect, normal behavior    ED  Course      Procedures  Results for orders placed during the hospital encounter of 04/18/19   POC US ECHO LIMITED    Impression Limited Bedside ED Cardiac Ultrasound  PROCEDURE: PERFORMED BY: Dr. Dane Keenan  INDICATIONS/SYMPTOM:  Concern for pulmonary edema  PROBE: Cardiac phased array probe  BODY LOCATION: Chest (cardiac)     FINDINGS: The ultrasound was performed utilizing the subcostal, parasternal long axis, parasternal short axis and apical 4 chamber views. Exam limited by difficult image acquisition.   Grossly decreased LV contractility. Probable small pericardial effusion visualized. IVC unable to visualize.   INTERPRETATION:    Grossly decreased LV contractility. Probable small pericardial effusion. No RV dilation.   IMAGE DOCUMENTATION: Images were archived to PACs system.    Limited Bedside ED Ultrasound of Thorax:  PROCEDURE: PERFORMED BY: Dr. Dane Keenan  INDICATIONS/SYMPTOM:  Concern for pulmonary edema  PROBE: Cardiac phased array probe  BODY LOCATION: Chest (Lungs)  FINDINGS: Images of both lung hemithoracies taken in 2D in multiple rib spaces  Left lung: Sliding signs intact. Minimal B-lines. Lung bases without visualized fluid above the diaphragm.   Right lung: Sliding signs intact. Minimal B-lines. Lung bases without visualized fluid above the diaphragm.   INTERPRETATION:    No evidence of pneumothorax, no evidence of pulmonary edema, no evidence of pleural effusion.   IMAGE DOCUMENTATION: Images were archived to PACs system.          Critical Care time:  none       Labs Ordered and Resulted from Time of ED Arrival Up to the Time of Departure from the ED   CBC WITH PLATELETS DIFFERENTIAL - Abnormal; Notable for the following components:       Result Value    Hematocrit 48.7 (*)      (*)     MCHC 31.0 (*)     All other components within normal limits   COMPREHENSIVE METABOLIC PANEL - Abnormal; Notable for the following components:    Carbon Dioxide 34 (*)     Glucose 109 (*)      Calcium 8.3 (*)     Albumin 2.9 (*)     Protein Total 6.5 (*)     All other components within normal limits   NT PROBNP INPATIENT - Abnormal; Notable for the following components:    N-Terminal Pro BNP Inpatient 3,085 (*)     All other components within normal limits   LIPASE - Abnormal; Notable for the following components:    Lipase 45 (*)     All other components within normal limits   PERIPHERAL IV CATHETER            Assessments & Plan (with Medical Decision Making)   Patient presenting with reported concern for pulmonary edema or aspiration based on CXR after having vomiting a few days ago. Nursing note from EMS report at time of patient arrival had reported fever and abdominal XR rather than CXR. Patient denies fever and is afebrile here. Vitals in the ED wnl.     Given reported concern of a CXR with possible aspiration vs edema findings, and abdominal concerns of possible obstruction, plan CT chest/abdomen/pelvis. IV established, serum labs sent.     BNP elevated at 3,085 but substantially improved from prior. Bedside ultrasound without evidence of pulmonary edema, did show poor LV function consistent with patient's known CHF. She is euvolemic on exam. CT findings in the chest most consistent with mild atelectasis, possible trace aspiration but not demonstrating clinical findings of aspiration pneumonia. No leukocytosis nor fever to suggest significant infection.     No LFT elevations to suggest hepatobiliary pathology. No lipase elevation to suggest pancreatitis. No peritoneal signs on exam to suggest peritonitis. CT findings in the abdomen notable for chronic CBD dilation, chronic rectum/sigmoid wall thickening with slight twisted appearance that is improved from prior, stable hiatal hernia.     Overall work-up reassuring, with no evidence of obstruction, pulmonary edema, nor pneumonia. Patient asymptomatic in the ED after vomiting and abdominal pain a few days ago. After counseling the patient and son on  the diagnosis, work-up, and treatment plan, the patient was discharged to home. The patient was advised to follow-up with her PCP in a few days. The patient was advised to return to the ED if worsening symptoms, or if there are any urgent/life-threatening concerns.       Final diagnoses:   Vomiting, intractability of vomiting not specified, presence of nausea not specified, unspecified vomiting type   Congestive heart failure, unspecified HF chronicity, unspecified heart failure type (H)       --  Dane Keenan MD  Emergency Medicine     I have reviewed the nursing notes.  I have reviewed the findings, diagnosis, plan and need for follow up with the patient.  Current Discharge Medication List          4/18/2019   [facility], [place], EMERGENCY DEPARTMENT      --  Addendum after discharge:  Received updated radiology read of gallstones in the CBD that were not appreciated on initial read. Called the patient and her son's phone numbers listed in Epic without answer. Was able to reach an RN at the patient's living/care facility. Updated Radiology read and brief note on updated recommendations were faxed to the facility.     Dane Keenan MD  Emergency Medicine       Dane Keenan MD  04/19/19 [id number]

## 2019-04-19 NOTE — ED NOTES
After patient discharge, received updated read from radiology for her CT chest/abdomen/pelvis. New finding of gallstones in the common bile duct. While not likely a great candidate for operative intervention, she may benefit from ERCP. Updated read discussed with RN at the patient's care facility and updated read faxed at her request. This note also faxed with the updated imaging read. Patient would benefit from being seen by GI for discussion of possible ERCP, should return to the ED if abdominal pain worsening or fever.     Dane Keenan  Emergency Medicine      Dane Keenan MD  04/19/19 [id number]

## 2019-04-22 ENCOUNTER — RECORDS - HEALTHEAST (OUTPATIENT)
Dept: LAB | Facility: CLINIC | Age: 84
End: 2019-04-22

## 2019-04-22 LAB
ANION GAP SERPL CALCULATED.3IONS-SCNC: 11 MMOL/L (ref 5–18)
BUN SERPL-MCNC: 21 MG/DL (ref 8–28)
CALCIUM SERPL-MCNC: 8.6 MG/DL (ref 8.5–10.5)
CHLORIDE BLD-SCNC: 95 MMOL/L (ref 98–107)
CO2 SERPL-SCNC: 37 MMOL/L (ref 22–31)
CREAT SERPL-MCNC: 0.7 MG/DL (ref 0.6–1.1)
GFR SERPL CREATININE-BSD FRML MDRD: >60 ML/MIN/1.73M2
GLUCOSE BLD-MCNC: 98 MG/DL (ref 70–125)
POTASSIUM BLD-SCNC: 3.5 MMOL/L (ref 3.5–5)
SODIUM SERPL-SCNC: 143 MMOL/L (ref 136–145)

## 2019-05-22 ENCOUNTER — ANCILLARY PROCEDURE (OUTPATIENT)
Dept: CARDIOLOGY | Facility: CLINIC | Age: 84
End: 2019-05-22
Attending: INTERNAL MEDICINE
Payer: COMMERCIAL

## 2019-05-22 DIAGNOSIS — I49.5 SINUS NODE DYSFUNCTION (H): ICD-10-CM

## 2019-05-22 PROCEDURE — 93293 PM PHONE R-STRIP DEVICE EVAL: CPT | Performed by: INTERNAL MEDICINE

## 2019-08-28 ENCOUNTER — ANCILLARY PROCEDURE (OUTPATIENT)
Dept: CARDIOLOGY | Facility: CLINIC | Age: 84
End: 2019-08-28
Attending: INTERNAL MEDICINE
Payer: COMMERCIAL

## 2019-08-28 DIAGNOSIS — I49.5 SINUS NODE DYSFUNCTION (H): ICD-10-CM

## 2019-08-28 LAB
MDC_IDC_PG_IMPLANT_DTM: NORMAL
MDC_IDC_PG_MFG: NORMAL
MDC_IDC_PG_MODEL: NORMAL
MDC_IDC_PG_SERIAL: NORMAL
MDC_IDC_PG_TYPE: NORMAL
MDC_IDC_SESS_CLINIC_NAME: NORMAL
MDC_IDC_SESS_DTM: NORMAL
MDC_IDC_SESS_TYPE: NORMAL

## 2019-08-28 PROCEDURE — 93293 PM PHONE R-STRIP DEVICE EVAL: CPT | Performed by: INTERNAL MEDICINE

## 2019-10-02 ENCOUNTER — HEALTH MAINTENANCE LETTER (OUTPATIENT)
Age: 84
End: 2019-10-02

## 2019-10-30 ENCOUNTER — HEALTH MAINTENANCE LETTER (OUTPATIENT)
Age: 84
End: 2019-10-30

## 2019-11-29 ENCOUNTER — RECORDS - HEALTHEAST (OUTPATIENT)
Dept: LAB | Facility: CLINIC | Age: 84
End: 2019-11-29

## 2019-12-02 LAB — MAGNESIUM SERPL-MCNC: 1.9 MG/DL (ref 1.8–2.6)

## 2019-12-12 ENCOUNTER — DOCUMENTATION ONLY (OUTPATIENT)
Dept: CARDIOLOGY | Facility: CLINIC | Age: 84
End: 2019-12-12

## 2019-12-12 DIAGNOSIS — Z95.0 CARDIAC PACEMAKER IN SITU: Primary | ICD-10-CM

## 2019-12-12 NOTE — TELEPHONE ENCOUNTER
PPM rep check from 12/4/2019, performed at care facility.     [employer] Insignia (D) Pacemaker Device Check  AP: NA %    : 14 %    Mode: VVIR     Underlying Rhythm: AF with V rates 60-90 bpm    Heart Rate: excellent variability     Sensing: WNL    Pacing Threshold: slightly high but stable    Impedance: WNL    Battery Status: 2.0 yrs estimated longevity     Device Site: not documented     Atrial Arrhythmia: chronic AF, not on AC since epidural hematoma in 7/2016    Ventricular Arrhythmia: 10 episodes in the last year, 2 EGMs for review. First EGM shows a few beats of probable non-capture, so  followed directly by VS causing device to double count. Second EGM shows 12 beats of VS likely to be NSVT,  bpm. This episode occurred 6/6/2019 at 12:57pm. Most recent echo is from 2017 and shows EF of 15-20%. Pt has had NSVT in the past.      Setting Change: V Amplitude changed from 3.0V to 3.2V to ensure capture.     Care Plan: phone check in 3 months, scheduled.       PPM was put in for SSS in 2007 (this was a gen change, original device and leads were 1999). Pt has now developed chronic AF. She only V paces 14% of the time. River, [employer] rep, suggested that we could try changing mode to VVI 40 and see how much pt paces and how she feels, to assess if she will need a gen change this time around. Currently 2.0 yrs left on battery. Pt last saw Dr. Henning in 2014, she no longer comes in for follow-up. Will review this plan with Dr. Henning.

## 2019-12-16 ENCOUNTER — HEALTH MAINTENANCE LETTER (OUTPATIENT)
Age: 84
End: 2019-12-16

## 2020-02-19 ENCOUNTER — RECORDS - HEALTHEAST (OUTPATIENT)
Dept: LAB | Facility: CLINIC | Age: 85
End: 2020-02-19

## 2020-02-20 LAB
ANION GAP SERPL CALCULATED.3IONS-SCNC: 7 MMOL/L (ref 5–18)
BUN SERPL-MCNC: 14 MG/DL (ref 8–28)
CALCIUM SERPL-MCNC: 8.4 MG/DL (ref 8.5–10.5)
CHLORIDE BLD-SCNC: 96 MMOL/L (ref 98–107)
CO2 SERPL-SCNC: 37 MMOL/L (ref 22–31)
CREAT SERPL-MCNC: 0.59 MG/DL (ref 0.6–1.1)
GFR SERPL CREATININE-BSD FRML MDRD: >60 ML/MIN/1.73M2
GLUCOSE BLD-MCNC: 99 MG/DL (ref 70–125)
POTASSIUM BLD-SCNC: 3.5 MMOL/L (ref 3.5–5)
SODIUM SERPL-SCNC: 140 MMOL/L (ref 136–145)
TSH SERPL DL<=0.005 MIU/L-ACNC: 0.99 UIU/ML (ref 0.3–5)

## 2020-03-13 ENCOUNTER — ANCILLARY PROCEDURE (OUTPATIENT)
Dept: CARDIOLOGY | Facility: CLINIC | Age: 85
End: 2020-03-13
Attending: INTERNAL MEDICINE
Payer: COMMERCIAL

## 2020-03-13 DIAGNOSIS — Z95.0 CARDIAC PACEMAKER IN SITU: ICD-10-CM

## 2020-03-13 PROCEDURE — 93293 PM PHONE R-STRIP DEVICE EVAL: CPT | Performed by: INTERNAL MEDICINE

## 2020-06-12 ENCOUNTER — ANCILLARY PROCEDURE (OUTPATIENT)
Dept: CARDIOLOGY | Facility: CLINIC | Age: 85
End: 2020-06-12
Attending: INTERNAL MEDICINE
Payer: COMMERCIAL

## 2020-06-12 DIAGNOSIS — I49.5 SICK SINUS SYNDROME (H): Primary | ICD-10-CM

## 2020-06-12 DIAGNOSIS — Z95.0 CARDIAC PACEMAKER IN SITU: ICD-10-CM

## 2020-06-12 PROCEDURE — 93293 PM PHONE R-STRIP DEVICE EVAL: CPT | Performed by: INTERNAL MEDICINE

## 2020-09-11 ENCOUNTER — ANCILLARY PROCEDURE (OUTPATIENT)
Dept: CARDIOLOGY | Facility: CLINIC | Age: 85
End: 2020-09-11
Attending: INTERNAL MEDICINE
Payer: COMMERCIAL

## 2020-09-11 DIAGNOSIS — I49.5 SICK SINUS SYNDROME (H): ICD-10-CM

## 2020-09-11 PROCEDURE — 93293 PM PHONE R-STRIP DEVICE EVAL: CPT | Performed by: INTERNAL MEDICINE

## 2020-10-16 ENCOUNTER — ANCILLARY PROCEDURE (OUTPATIENT)
Dept: CARDIOLOGY | Facility: CLINIC | Age: 85
End: 2020-10-16
Attending: INTERNAL MEDICINE
Payer: COMMERCIAL

## 2020-10-16 DIAGNOSIS — Z95.0 CARDIAC PACEMAKER IN SITU: ICD-10-CM

## 2020-10-21 ENCOUNTER — TELEPHONE (OUTPATIENT)
Dept: CARDIOLOGY | Facility: CLINIC | Age: 85
End: 2020-10-21

## 2020-10-21 NOTE — TELEPHONE ENCOUNTER
Called and spoke with nurse at [facility]. Instructed her to stop metoprolol per Dr Henning. Sent faxed order. Will send Rep to [facility] to recheck PPM and to adjust sensitivity and pacing thresholds in attempt to make sure PPM is capturing. Called and informed River Orozco, [employer] Rep.

## 2020-10-21 NOTE — TELEPHONE ENCOUNTER
PPM phone teletrace check done today shows QRS undersensing and probable loss of capture. She has hx of higher thresholds in the past. Underlying rhythm is afib with rates 50 - 70s. She is in a NS home and bedridden and can only be transported per stretcher. There is no change symptoms. She has been checked by a PPM Rep only for several years. Rep is willing to go to NS home and check device as long as he has a plan. Will review with Dr Henning.

## 2020-10-26 DIAGNOSIS — I49.5 SICK SINUS SYNDROME (H): Primary | ICD-10-CM

## 2020-11-12 ENCOUNTER — APPOINTMENT (OUTPATIENT)
Dept: GENERAL RADIOLOGY | Facility: CLINIC | Age: 85
DRG: 177 | End: 2020-11-12
Attending: EMERGENCY MEDICINE
Payer: COMMERCIAL

## 2020-11-12 ENCOUNTER — HOSPITAL ENCOUNTER (INPATIENT)
Facility: CLINIC | Age: 85
LOS: 6 days | Discharge: HOME HEALTH | DRG: 177 | End: 2020-11-18
Attending: EMERGENCY MEDICINE | Admitting: STUDENT IN AN ORGANIZED HEALTH CARE EDUCATION/TRAINING PROGRAM
Payer: COMMERCIAL

## 2020-11-12 DIAGNOSIS — S06.4XAA EPIDURAL HEMATOMA (H): ICD-10-CM

## 2020-11-12 DIAGNOSIS — R06.02 SHORTNESS OF BREATH: ICD-10-CM

## 2020-11-12 DIAGNOSIS — N39.0 URINARY TRACT INFECTION WITHOUT HEMATURIA, SITE UNSPECIFIED: Primary | ICD-10-CM

## 2020-11-12 DIAGNOSIS — Z20.822 SUSPECTED COVID-19 VIRUS INFECTION: ICD-10-CM

## 2020-11-12 DIAGNOSIS — U07.1 COVID-19: ICD-10-CM

## 2020-11-12 DIAGNOSIS — R41.0 DELIRIUM: ICD-10-CM

## 2020-11-12 LAB
ALBUMIN SERPL-MCNC: 3.2 G/DL (ref 3.4–5)
ALP SERPL-CCNC: 75 U/L (ref 40–150)
ALT SERPL W P-5'-P-CCNC: 17 U/L (ref 0–50)
ANION GAP SERPL CALCULATED.3IONS-SCNC: 5 MMOL/L (ref 3–14)
AST SERPL W P-5'-P-CCNC: 21 U/L (ref 0–45)
BASOPHILS # BLD AUTO: 0 10E9/L (ref 0–0.2)
BASOPHILS NFR BLD AUTO: 0.2 %
BILIRUB SERPL-MCNC: 0.4 MG/DL (ref 0.2–1.3)
BUN SERPL-MCNC: 13 MG/DL (ref 7–30)
CA-I BLD-SCNC: 4.1 MG/DL (ref 4.4–5.2)
CALCIUM SERPL-MCNC: 7.8 MG/DL (ref 8.5–10.1)
CHLORIDE SERPL-SCNC: 101 MMOL/L (ref 94–109)
CO2 BLDCOV-SCNC: 39 MMOL/L (ref 21–28)
CO2 SERPL-SCNC: 34 MMOL/L (ref 20–32)
CREAT SERPL-MCNC: 0.57 MG/DL (ref 0.52–1.04)
DIFFERENTIAL METHOD BLD: ABNORMAL
EOSINOPHIL # BLD AUTO: 0 10E9/L (ref 0–0.7)
EOSINOPHIL NFR BLD AUTO: 0.4 %
ERYTHROCYTE [DISTWIDTH] IN BLOOD BY AUTOMATED COUNT: 14.8 % (ref 10–15)
GFR SERPL CREATININE-BSD FRML MDRD: 77 ML/MIN/{1.73_M2}
GLUCOSE BLD-MCNC: 118 MG/DL (ref 70–99)
GLUCOSE SERPL-MCNC: 116 MG/DL (ref 70–99)
HCT VFR BLD AUTO: 49.4 % (ref 35–47)
HCT VFR BLD CALC: 49 %PCV (ref 35–47)
HGB BLD CALC-MCNC: 16.7 G/DL (ref 11.7–15.7)
HGB BLD-MCNC: 15.4 G/DL (ref 11.7–15.7)
IMM GRANULOCYTES # BLD: 0 10E9/L (ref 0–0.4)
IMM GRANULOCYTES NFR BLD: 0.8 %
LACTATE BLD-SCNC: 1.7 MMOL/L (ref 0.7–2)
LYMPHOCYTES # BLD AUTO: 1.6 10E9/L (ref 0.8–5.3)
LYMPHOCYTES NFR BLD AUTO: 32.3 %
MCH RBC QN AUTO: 31.3 PG (ref 26.5–33)
MCHC RBC AUTO-ENTMCNC: 31.2 G/DL (ref 31.5–36.5)
MCV RBC AUTO: 100 FL (ref 78–100)
MONOCYTES # BLD AUTO: 0.4 10E9/L (ref 0–1.3)
MONOCYTES NFR BLD AUTO: 8.1 %
NEUTROPHILS # BLD AUTO: 2.8 10E9/L (ref 1.6–8.3)
NEUTROPHILS NFR BLD AUTO: 58.2 %
NRBC # BLD AUTO: 0 10*3/UL
NRBC BLD AUTO-RTO: 0 /100
NT-PROBNP SERPL-MCNC: 2940 PG/ML (ref 0–1800)
PCO2 BLDV: 59 MM HG (ref 40–50)
PH BLDV: 7.43 PH (ref 7.32–7.43)
PLATELET # BLD AUTO: 157 10E9/L (ref 150–450)
PO2 BLDV: 34 MM HG (ref 25–47)
POTASSIUM BLD-SCNC: 3.5 MMOL/L (ref 3.4–5.3)
POTASSIUM SERPL-SCNC: 3.2 MMOL/L (ref 3.4–5.3)
PROT SERPL-MCNC: 6.9 G/DL (ref 6.8–8.8)
RBC # BLD AUTO: 4.92 10E12/L (ref 3.8–5.2)
SAO2 % BLDV FROM PO2: 64 %
SODIUM BLD-SCNC: 142 MMOL/L (ref 133–144)
SODIUM SERPL-SCNC: 141 MMOL/L (ref 133–144)
TROPONIN I SERPL-MCNC: 0.04 UG/L (ref 0–0.04)
WBC # BLD AUTO: 4.8 10E9/L (ref 4–11)

## 2020-11-12 PROCEDURE — 71045 X-RAY EXAM CHEST 1 VIEW: CPT

## 2020-11-12 PROCEDURE — 93308 TTE F-UP OR LMTD: CPT | Mod: 26 | Performed by: EMERGENCY MEDICINE

## 2020-11-12 PROCEDURE — 120N000002 HC R&B MED SURG/OB UMMC

## 2020-11-12 PROCEDURE — 999N001076 HC STATISTIC SODIUM ED POCT

## 2020-11-12 PROCEDURE — 82330 ASSAY OF CALCIUM: CPT

## 2020-11-12 PROCEDURE — 93308 TTE F-UP OR LMTD: CPT | Performed by: EMERGENCY MEDICINE

## 2020-11-12 PROCEDURE — 82803 BLOOD GASES ANY COMBINATION: CPT

## 2020-11-12 PROCEDURE — 250N000011 HC RX IP 250 OP 636: Performed by: PHYSICIAN ASSISTANT

## 2020-11-12 PROCEDURE — 76604 US EXAM CHEST: CPT | Performed by: EMERGENCY MEDICINE

## 2020-11-12 PROCEDURE — 999N001079 HC STATISTIC HEMATOCRIT ED POCT

## 2020-11-12 PROCEDURE — 999N001080 HC STATISTIC GLUCOSE ED POCT

## 2020-11-12 PROCEDURE — 83605 ASSAY OF LACTIC ACID: CPT | Performed by: EMERGENCY MEDICINE

## 2020-11-12 PROCEDURE — 99223 1ST HOSP IP/OBS HIGH 75: CPT | Mod: AI | Performed by: INTERNAL MEDICINE

## 2020-11-12 PROCEDURE — 85025 COMPLETE CBC W/AUTO DIFF WBC: CPT | Performed by: EMERGENCY MEDICINE

## 2020-11-12 PROCEDURE — 83880 ASSAY OF NATRIURETIC PEPTIDE: CPT | Performed by: EMERGENCY MEDICINE

## 2020-11-12 PROCEDURE — 99285 EMERGENCY DEPT VISIT HI MDM: CPT | Mod: 25 | Performed by: EMERGENCY MEDICINE

## 2020-11-12 PROCEDURE — 84484 ASSAY OF TROPONIN QUANT: CPT | Performed by: EMERGENCY MEDICINE

## 2020-11-12 PROCEDURE — 76604 US EXAM CHEST: CPT | Mod: 26 | Performed by: EMERGENCY MEDICINE

## 2020-11-12 PROCEDURE — 999N001077 HC STATISTIC POTASSIUM ED POCT

## 2020-11-12 PROCEDURE — 80053 COMPREHEN METABOLIC PANEL: CPT | Performed by: EMERGENCY MEDICINE

## 2020-11-12 PROCEDURE — 250N000013 HC RX MED GY IP 250 OP 250 PS 637: Performed by: PHYSICIAN ASSISTANT

## 2020-11-12 PROCEDURE — 71045 X-RAY EXAM CHEST 1 VIEW: CPT | Mod: 26 | Performed by: RADIOLOGY

## 2020-11-12 RX ORDER — SIMETHICONE 80 MG
80 TABLET,CHEWABLE ORAL 4 TIMES DAILY PRN
Status: DISCONTINUED | OUTPATIENT
Start: 2020-11-12 | End: 2020-11-18 | Stop reason: HOSPADM

## 2020-11-12 RX ORDER — LIDOCAINE 40 MG/G
CREAM TOPICAL
Status: DISCONTINUED | OUTPATIENT
Start: 2020-11-12 | End: 2020-11-18 | Stop reason: HOSPADM

## 2020-11-12 RX ORDER — FUROSEMIDE 20 MG
40 TABLET ORAL DAILY
Status: DISCONTINUED | OUTPATIENT
Start: 2020-11-12 | End: 2020-11-18 | Stop reason: HOSPADM

## 2020-11-12 RX ORDER — AMOXICILLIN 250 MG
2 CAPSULE ORAL 2 TIMES DAILY
Status: DISCONTINUED | OUTPATIENT
Start: 2020-11-12 | End: 2020-11-12

## 2020-11-12 RX ORDER — CITALOPRAM HYDROBROMIDE 10 MG/1
10 TABLET ORAL DAILY
Status: DISCONTINUED | OUTPATIENT
Start: 2020-11-13 | End: 2020-11-18 | Stop reason: HOSPADM

## 2020-11-12 RX ORDER — AMOXICILLIN 250 MG
2 CAPSULE ORAL 2 TIMES DAILY
Status: DISCONTINUED | OUTPATIENT
Start: 2020-11-12 | End: 2020-11-18 | Stop reason: HOSPADM

## 2020-11-12 RX ORDER — ONDANSETRON 4 MG/1
4 TABLET, ORALLY DISINTEGRATING ORAL EVERY 6 HOURS PRN
Status: DISCONTINUED | OUTPATIENT
Start: 2020-11-12 | End: 2020-11-18 | Stop reason: HOSPADM

## 2020-11-12 RX ORDER — FUROSEMIDE 10 MG/ML
10 INJECTION INTRAMUSCULAR; INTRAVENOUS ONCE
Status: DISCONTINUED | OUTPATIENT
Start: 2020-11-12 | End: 2020-11-17 | Stop reason: CLARIF

## 2020-11-12 RX ORDER — LORAZEPAM 0.5 MG/1
0.5 TABLET ORAL DAILY PRN
Status: DISCONTINUED | OUTPATIENT
Start: 2020-11-12 | End: 2020-11-18 | Stop reason: HOSPADM

## 2020-11-12 RX ORDER — LACTULOSE 10 G/15ML
30 SOLUTION ORAL DAILY PRN
Status: DISCONTINUED | OUTPATIENT
Start: 2020-11-12 | End: 2020-11-18 | Stop reason: HOSPADM

## 2020-11-12 RX ORDER — ONDANSETRON 2 MG/ML
4 INJECTION INTRAMUSCULAR; INTRAVENOUS EVERY 6 HOURS PRN
Status: DISCONTINUED | OUTPATIENT
Start: 2020-11-12 | End: 2020-11-18 | Stop reason: HOSPADM

## 2020-11-12 RX ORDER — MAGNESIUM OXIDE 400 MG/1
400 TABLET ORAL DAILY
Status: DISCONTINUED | OUTPATIENT
Start: 2020-11-12 | End: 2020-11-18 | Stop reason: HOSPADM

## 2020-11-12 RX ORDER — NICOTINE POLACRILEX 4 MG/1
20 GUM, CHEWING ORAL 2 TIMES DAILY
Status: DISCONTINUED | OUTPATIENT
Start: 2020-11-12 | End: 2020-11-12

## 2020-11-12 RX ORDER — POLYETHYLENE GLYCOL 3350 17 G/17G
17 POWDER, FOR SOLUTION ORAL DAILY
Status: DISCONTINUED | OUTPATIENT
Start: 2020-11-12 | End: 2020-11-12

## 2020-11-12 RX ORDER — LEVOTHYROXINE SODIUM 75 UG/1
75 TABLET ORAL DAILY
Status: DISCONTINUED | OUTPATIENT
Start: 2020-11-13 | End: 2020-11-18 | Stop reason: HOSPADM

## 2020-11-12 RX ORDER — POLYETHYLENE GLYCOL 3350 17 G/17G
17 POWDER, FOR SOLUTION ORAL DAILY
Status: DISCONTINUED | OUTPATIENT
Start: 2020-11-12 | End: 2020-11-18 | Stop reason: HOSPADM

## 2020-11-12 RX ORDER — HEPARIN SODIUM 5000 [USP'U]/.5ML
5000 INJECTION, SOLUTION INTRAVENOUS; SUBCUTANEOUS EVERY 12 HOURS
Status: DISCONTINUED | OUTPATIENT
Start: 2020-11-12 | End: 2020-11-18 | Stop reason: HOSPADM

## 2020-11-12 RX ORDER — VIT C/E/ZN/COPPR/LUTEIN/ZEAXAN 60 MG-6 MG
1 CAPSULE ORAL DAILY
Status: DISCONTINUED | OUTPATIENT
Start: 2020-11-12 | End: 2020-11-18 | Stop reason: HOSPADM

## 2020-11-12 RX ORDER — CALCIUM CARBONATE 500 MG/1
1000 TABLET, CHEWABLE ORAL 4 TIMES DAILY PRN
Status: DISCONTINUED | OUTPATIENT
Start: 2020-11-12 | End: 2020-11-18 | Stop reason: HOSPADM

## 2020-11-12 RX ORDER — LISINOPRIL 5 MG/1
5 TABLET ORAL 2 TIMES DAILY
Status: DISCONTINUED | OUTPATIENT
Start: 2020-11-12 | End: 2020-11-18 | Stop reason: HOSPADM

## 2020-11-12 RX ORDER — AMOXICILLIN 250 MG
1 CAPSULE ORAL 2 TIMES DAILY
Status: DISCONTINUED | OUTPATIENT
Start: 2020-11-12 | End: 2020-11-12

## 2020-11-12 RX ADMIN — ASPIRIN 325 MG: 325 TABLET, COATED ORAL at 16:17

## 2020-11-12 RX ADMIN — OMEPRAZOLE 20 MG: 20 CAPSULE, DELAYED RELEASE ORAL at 16:17

## 2020-11-12 RX ADMIN — HEPARIN SODIUM 5000 UNITS: 10000 INJECTION, SOLUTION INTRAVENOUS; SUBCUTANEOUS at 19:33

## 2020-11-12 RX ADMIN — FUROSEMIDE 40 MG: 20 TABLET ORAL at 16:18

## 2020-11-12 RX ADMIN — DOCUSATE SODIUM 50 MG AND SENNOSIDES 8.6 MG 2 TABLET: 8.6; 5 TABLET, FILM COATED ORAL at 19:32

## 2020-11-12 RX ADMIN — MAGNESIUM OXIDE 400 MG: 400 TABLET ORAL at 16:17

## 2020-11-12 RX ADMIN — LISINOPRIL 5 MG: 5 TABLET ORAL at 19:32

## 2020-11-12 ASSESSMENT — ACTIVITIES OF DAILY LIVING (ADL)
ADLS_ACUITY_SCORE: 33
ADLS_ACUITY_SCORE: 33

## 2020-11-12 ASSESSMENT — MIFFLIN-ST. JEOR: SCORE: 1378.33

## 2020-11-12 NOTE — H&P
[facility]     History and Physical - Hospitalist Service, [place] [facility]       Date of Admission:  11/12/2020    Assessment & Plan   Ankita Pham is a 89 year old female admitted on 11/12/2020. She has a past medical history of IBS, chronic lympedema of her bilateral extremities keeping her from mobilizing well and developing pressure ulcers, atrial fibrillation s/p PPM, severe heart failure with EF 15%, epidural hematoma (July 2016) who was found to be COVID 19 positive 2 days ago at her skilled facility, then developed shortness of breath and hypoxia, now stable on 2L who is being admitted to [facility] for further care.     1. Acute COVID 19 infection with hypoxia  -We will do a trial of trying to wean her off oxygen and if she is not able, we will start Dexamethasone 6mg once daily x 10 days  -Continue with Albuterol inhaler prn for any wheezing, shortness of breath  -Oxygen therapy to maintain her 90% or greater    2. Acute on chronic heart failure exacerbation (EF 15% per TTE 2017)  3. Moderate left sided pleural effusion  4. Hypertension  5. Atrial fibrillation s/p [employer] Ultra Insignia pacemaker placement on ASA only due to past subdural hematoma  -Bedside TTE is being done. If it displays evidence of acute heart failure exacerbation, we will start gentle diuresis starting with 20mg oral Lasix (or IV Lasix 10mg) to see how she responds  -Continue home regimen of Lisinopril 5mg twice daily , Metoprolol XL 12.5mg once daily at bedtime   -Her PPM team follows her closely. She recently had her Metoprolol discontinued on 10/21/20.  -Continue home ASA 325mg by mouth once daily  -Check daily weights, strict I/O, daily electrolytes and replete as needed    6. Chronic moderate lymphedema of bilateral lower extremities  7. Completely dependent of her ADLs  -She does not walk at her skilled facility and until she improves, I do not see any benefit from PT currently.    -She does not like to have compression wraps on her legs    8. GERD  -Continue Prilosec 20mg once daily    9. Hypothyroidism  -Continue home regimen of Levothyroxine 75mcg by mouth once daily    10. Depression  -Continue home regimen of Celexa 10mg by mouth once daily    11. Goals of care discussion  -Patient and son were clear that she wishes to be DNR/DNI at this time. Her previous record at her skilled facility notes that she was DNR only, however, after a conversation yesterday with her son knowing that she was COVID 19 positive, was that she would not want aggressive care of any kind. She would want antibiotics, fluids, high flow oxygen and maybe temporary bipap.        Diet: Combination Diet Regular Diet Adult    DVT Prophylaxis: Heparin 5000U twice daily  [place] Catheter: in place, indication:    Code Status:   DNR/DNI - confirmed by her power of /son on admission today         Disposition Plan   [facility]   Entered: RADHA Ervin 11/12/2020, 1:42 PM       RADHA Ervin  [facility]   Contact information available via [facility] Paging/Directory  Please see sign in/sign out for up to date coverage information    ______________________________________________________________________    Chief Complaint   Shortness of breath    History is obtained from the patient    History of Present Illness   Ankita Pham is a 89 year old female admitted on 11/12/2020. She has a past medical history of IBS, chronic lympedema of her bilateral extremitites keeping her from mobilizing well and developing pressure ulcers, atrial fibrillation s/p PPM, epidural hematoma (July 2016) who was found to be COVID 19 positive 2 days ago at her skilled facility, then developed shortness of breath and hypoxia at 76% at her facility. Ambulance was called and she was brought to the ER for further evaluation.     In the ER, she was placed on 2L  nasal canula and her O2 improved to 98%. Labwork  Revealed elevated BNP at 2,940 and mild hypokalemia at 3.2. Lactic acid was normal. Chest x-ray revealed bilateral interstitial opacities, infectious vs. Edema with a moderate sized left pleural effusion. ER staff are planning to do a bedside TTE to further evaluate. She currently denies any chest pain, fevers, chills, headaches, vision changes and does not feel short of breath. She reports coughing from time to time but no blood in her sputum. She reported that her son is her primary decision maker and knows her the best and asked that I call to update him. We reviewed her plan of care, her COVID status, current treatment plan and she was thankful but just wanted to go back to rest.  She will be admitted to the [facility] medicine team for further care.     Review of Systems    The 10 point Review of Systems is negative other than noted in the HPI or here.     Past Medical History    I have reviewed this patient's medical history and updated it with pertinent information if needed.   Past Medical History:   Diagnosis Date     Acute, but ill-defined, cerebrovascular disease 1975    incontinent     Cardiac dysrhythmia, unspecified     sees Dr. Marcus     Cardiac pacemaker in situ 1999     Chronic peptic ulcer, unspecified site, without mention of hemorrhage, perforation, or obstruction      Diaphragmatic hernia without mention of obstruction or gangrene      Edema      Esophageal reflux 12/13/2006     Essential hypertension, benign      Heart disease, unspecified      Irritable bowel syndrome      Lymphedema      Myalgia and myositis, unspecified      Open wound of heel 3/31/2014    Right heel.     Osteoarthrosis, unspecified whether generalized or localized, unspecified site     Dr. Ashly Palacios     Other abnormal glucose 2005     Other chronic pulmonary heart diseases      Rotator cuff (capsule) sprain     left, Dr. Paredes didn't think surgery worth risks     Unspecified  hypothyroidism      Unspecified hypothyroidism      Unspecified sleep apnea      Urinary incontinence 2014       Past Surgical History   I have reviewed this patient's surgical history and updated it with pertinent information if needed.  Past Surgical History:   Procedure Laterality Date     ENDOSCOPIC RELEASE CARPAL TUNNEL  10/26/2011    Procedure:ENDOSCOPIC RELEASE CARPAL TUNNEL; RIGHT ENDOSCOPIC CARPAL TUNNEL RELEASE, RIGHT LONG AND RING A-1 JAMIL RELEASE; Surgeon:KATE MARRERO; Location: [facility]     INCISION AND DRAINAGE SACRAL WOUND, COMBINED N/A 2014    Procedure: COMBINED INCISION AND DRAINAGE SACRAL WOUND;  Surgeon: Osorio Morgan MD;  Location: [facility] OR     RELEASE TRIGGER FINGER  10/26/2011    Procedure:RELEASE TRIGGER FINGER; Surgeon:KATE MARRERO; Location:[facility]     SURGICAL HISTORY OF -       Ankle fracture Tib     SURGICAL HISTORY OF -       TIA'a     SURGICAL HISTORY OF -       Thyroiditis x3 remote     SURGICAL HISTORY OF -       Ulnar nerve transpostion     SURGICAL HISTORY OF -       Pacemaker (sinus phases, bradycardia)     SURGICAL HISTORY OF -       L atrial pacer     SURGICAL HISTORY OF -       appy     SURGICAL HISTORY OF -       Benign breast biopsy       Social History   I have reviewed this patient's social history and updated it with pertinent information if needed.  Social History     Tobacco Use     Smoking status: Former Smoker     Years: 10.00     Quit date: 1966     Years since quittin.5     Smokeless tobacco: Never Used   Substance Use Topics     Alcohol use: Yes     Alcohol/week: 46.7 standard drinks     Comment: 4 oz of wine  each evening     Drug use: No       Family History   I have reviewed this patient's family history and updated it with pertinent information if needed.  Family History   Problem Relation Age of Onset     Family History Negative Mother      Heart Disease Father      Heart Disease Sister         CHF      Heart Disease Brother         multiple MI     Respiratory Brother         COPD     Heart Disease Brother         MI     Heart Disease Brother          during open heart surgery     Diabetes No family hx of      Hypertension No family hx of        Prior to Admission Medications   Prior to Admission Medications   Prescriptions Last Dose Informant Patient Reported? Taking?   Citalopram Hydrobromide (CELEXA PO)   Yes No   Sig: Take 10 mg by mouth daily   Gabapentin (NEURONTIN PO)   Yes No   Sig: Take 200 mg by mouth daily   LORazepam (ATIVAN) 1 MG tablet   No No   Sig: Take 0.5 tablets (0.5 mg) by mouth daily as needed for anxiety   METOPROLOL SUCCINATE ER PO   Yes No   Sig: Take 12.5 mg by mouth At Bedtime   Ondansetron HCl (ZOFRAN PO)   Yes No   Sig: Take 4 mg by mouth every 6 hours as needed for nausea or vomiting   SIMETHICONE-80 PO   Yes No   Sig: Take 80 mg by mouth 2 times daily as needed for intestinal gas in addition to scheduled doses   acetaminophen (TYLENOL) 500 MG tablet   No No   Sig: Take 2 tablets (1,000 mg) by mouth 3 times daily   albuterol (2.5 MG/3ML) 0.083% nebulizer solution   No No   Sig: Take 1 vial (2.5 mg) by nebulization every 6 hours as needed for shortness of breath / dyspnea or wheezing   ascorbic acid 500 MG TABS   Yes No   Sig: Take 1 tablet (500 mg) by mouth daily   aspirin  MG tablet   Yes No   Sig: Take 325 mg by mouth daily    bisacodyl (DULCOLAX) 10 MG suppository   No No   Sig: Place 1 suppository (10 mg) rectally daily as needed for constipation   furosemide (LASIX) 40 MG tablet   No No   Sig: Take 1 tablet (40 mg) by mouth daily   hypromellose (ARTIFICIAL TEARS) 0.5 % SOLN   Yes No   Sig: Place 1 drop into both eyes 3 times daily   lactulose (CHRONULAC) 10 GM/15ML solution   Yes No   Sig: Take 30 mLs by mouth daily as needed for constipation   levothyroxine (SYNTHROID, LEVOTHROID) 75 MCG tablet   Yes No   Sig: Take 1 tablet (75 mcg) by mouth daily   lisinopril  (PRINIVIL/ZESTRIL) 5 MG tablet   No No   Sig: Take 1 tablet (5 mg) by mouth 2 times daily   magnesium oxide (MAG-OX) 400 MG tablet   No No   Sig: Take 1 tablet (400 mg) by mouth daily   multivitamin  with lutein (OCUVITE WITH LTEIN) CAPS   Yes No   Sig: Take 1 capsule by mouth daily   omeprazole 20 MG tablet   No No   Sig: Take 1 tablet (20 mg) by mouth 2 times daily   oxyCODONE (ROXICODONE) 5 MG IR tablet   No No   Sig: Take 1 tablet (5 mg) by mouth every 8 hours as needed for moderate to severe pain   polyethylene glycol (MIRALAX/GLYCOLAX) Packet   No No   Sig: Take 17 g by mouth daily   potassium chloride SA (K-DUR,KLOR-CON M) 20 MEQ tablet   No No   Sig: Take 1 tablet (20 mEq) by mouth daily   senna-docusate (SENOKOT-S;PERICOLACE) 8.6-50 MG per tablet   No No   Sig: Take 2 tablets by mouth 2 times daily      Facility-Administered Medications: None     Allergies   Allergies   Allergen Reactions     Cortisone Other (See Comments)     Mental changes      Nuts Anaphylaxis       Physical Exam   Vital Signs: Temp: 98.6  F (37  C) Temp src: Oral BP: 127/62 Pulse: 79   Resp: 18 SpO2: 97 % O2 Device: Nasal cannula Oxygen Delivery: 2 LPM  Weight: 221 lbs 0 oz    General Appearance: 89 year old female resting in bed at 30 degree angle, quite sleepy but able to wake up and answer questions, very pleasant in conversation  HEENT: normocephalic, atraumatic  Respiratory: breathing comfortably on 2L nasal canula, crackles present at the bases of her lungs bilaterally, left greater than right  Cardiovascular: regular rate and rhythm, no appreciable murmurs, rubs or gallops, significant lower extremity bing to the level of her thighs bilaterally  GI: tinkering bowel sounds present, soft, non-tender to palpation throughout, abdominal hernia present, reducible  Skin: warm, dry, no open sores or lesions  Musculoskeletal: minimal strength in bilateral lower extremities, equal strength in bilateral upper extremities  Psychiatric: able  to be woken up from her sleep and answer questions but frequently drifting off, denies any anxiety or agitation    Data   Data reviewed today: I reviewed all medications, new labs and imaging results over the last 24 hours.   Recent Labs   Lab 11/12/20  1134 11/12/20  1132   WBC  --  4.8   HGB 16.7* 15.4   MCV  --  100   PLT  --  157    141   POTASSIUM 3.5 3.2*   CHLORIDE  --  101   CO2  --  34*   BUN  --  13   CR  --  0.57   ANIONGAP  --  5   OMERO  --  7.8*   * 116*   ALBUMIN  --  3.2*   PROTTOTAL  --  6.9   BILITOTAL  --  0.4   ALKPHOS  --  75   ALT  --  17   AST  --  21   TROPI  --  0.041     Physician Attestation   I, Onur Bowen, saw and evaluated Ankita Pham as part of a shared visit.  I have reviewed and discussed with the advanced practice provider their history, physical and plan.    I personally reviewed the vital signs, medications, labs and imaging.    My key history or physical exam findings: Patient seen and examined today.  Pt with obesity, CHF, COPD, chronic lymphedema presenting with hypoxia in the context of COVID-19 pneumonia    PE:   VS: Afebrile and stable  GEN: NAD  CV: RRR S1/S2, no m,r,g  Pulm: slight increased work of breathing, bibasilar crackles  Abd: soft, NT, ND, +BS  Ext: warm and well perfused, 2+ edema b/l    Key management decisions made by me: Pt admitted with acute hypoxic respiratory failure in the context of COVID-19 pnuemonia.  Based on age, BMI, and chronic comorbidities patient is at high risk for progression to critical disease, however inflammatory markers and d-dimer are reassuring. Will monitor closely, start steroids if her O2 requirement persists and maintain on prophylactic anticoagulation.     Onur Bowen  Date of Service (when I saw the patient): 11/12/2020

## 2020-11-12 NOTE — ED TRIAGE NOTES
Patient brought in by ambulance from her AL facility after staff noted O2 sats of 76% on room air. Positive COVID test two days ago. Upon arrival patient is nauseous with dry heaves. O2 sat 100% on 4lpm NC.

## 2020-11-12 NOTE — PLAN OF CARE
Pt arrived on 5A, NST present for skin check, but no second nurse d/t pt being COVID positive. Pt has a pressure injury on coccyx it appears, Jessi KENT told and said he will place WOCRN consult. BLE edema, pillows in use and roorke boot ordered for R foot drop. Purewick in use.

## 2020-11-12 NOTE — ED NOTES
[facility]    ED Nurse to Floor Handoff     Ankita Pham is a 89 year old female who speaks English and lives with others,  in an assisted living  They arrived in the ED by ambulance from Assisted Living.    ED Chief Complaint: Shortness of Breath (Low O2 sats at AL. )    ED Dx;   Final diagnoses:   None         Needed?: No    Allergies:   Allergies   Allergen Reactions     Cortisone Other (See Comments)     Mental changes      Nuts Anaphylaxis   .  Past Medical Hx:   Past Medical History:   Diagnosis Date     Acute, but ill-defined, cerebrovascular disease 1975    incontinent     Cardiac dysrhythmia, unspecified     sees Dr. Marcus     Cardiac pacemaker in situ 1999     Chronic peptic ulcer, unspecified site, without mention of hemorrhage, perforation, or obstruction      Diaphragmatic hernia without mention of obstruction or gangrene      Edema      Esophageal reflux 12/13/2006     Essential hypertension, benign      Heart disease, unspecified      Irritable bowel syndrome      Lymphedema      Myalgia and myositis, unspecified      Open wound of heel 3/31/2014    Right heel.     Osteoarthrosis, unspecified whether generalized or localized, unspecified site     Dr. Ashly Palacios     Other abnormal glucose 2005     Other chronic pulmonary heart diseases      Rotator cuff (capsule) sprain     left, Dr. Paredes didn't think surgery worth risks     Unspecified hypothyroidism      Unspecified hypothyroidism      Unspecified sleep apnea      Urinary incontinence 8/1/2014      Baseline Mental status: WDL  Current Mental Status changes: at basesline    Infection present or suspected this encounter: cultures pending  Sepsis suspected: No  Isolation type: Special Precautions-COVID-19  Patient tested for COVID 19 prior to admission: NO, patient has a prior positive from her AL facility.    Activity level - Baseline/Home: Unknown, patient reports that she used to walk, but  does not anymore.  Activity Level - Current:   Unknown    Bariatric equipment needed?: No    In the ED these meds were given: Medications - No data to display    Drips running?  No    Home pump  No    Current LDAs  Peripheral IV 11/12/20 Left Lower forearm (Active)   Site Assessment WDL 11/12/20 1134   Line Status Saline locked 11/12/20 1134   Dressing Intervention New dressing  11/12/20 1134   Phlebitis Scale 0-->no symptoms 11/12/20 1134   Infiltration Scale 0 11/12/20 1134   Number of days: 0       Peripheral IV 03/04/17 Right Lower forearm (Active)   Number of days: 1349       Urethral Catheter Coude 16 fr (Active)   Number of days: 1350       Pressure Ulcer 05/24/14 Coccyx  (Active)   Number of days: 2364       Pressure Ulcer 02/25/15 Left Heel Small open ulcer (Active)   Number of days: 2087       Pressure Ulcer 07/14/16 Coccyx tunneled area- size of a pea, present on admission  Stage 3-4 (Active)   Number of days: 1582       Wound 05/24/14 Right Foot Ulceration (Active)   Number of days: 2364       Wound 08/26/14 Sacrum L=3cm, W=3.5 cm.depth= 4 cm at 6'oclock (Active)   Number of days: 2270       Incision/Surgical Site 08/28/14 Bilateral Sacrum (Active)   Number of days: 2268       Labs results:   Labs Ordered and Resulted from Time of ED Arrival Up to the Time of Departure from the ED   CBC WITH PLATELETS DIFFERENTIAL - Abnormal; Notable for the following components:       Result Value    Hematocrit 49.4 (*)     MCHC 31.2 (*)     All other components within normal limits   COMPREHENSIVE METABOLIC PANEL - Abnormal; Notable for the following components:    Potassium 3.2 (*)     Carbon Dioxide 34 (*)     Glucose 116 (*)     Calcium 7.8 (*)     Albumin 3.2 (*)     All other components within normal limits   ISTAT GASES ELEC ICA GLUC HAMZAH POCT - Abnormal; Notable for the following components:    PCO2 Venous 59 (*)     Bicarbonate Venous 39 (*)     Glucose 118 (*)     Calcium Ionized 4.1 (*)     Hemoglobin 16.7 (*)  "    Hematocrit - POCT 49 (*)     All other components within normal limits   TROPONIN I   LACTIC ACID WHOLE BLOOD   NT PROBNP INPATIENT   ISTAT CG8 GAS ELEC ICA GLUC HAMZAH NURSE POCT       Imaging Studies:   Recent Results (from the past 24 hour(s))   XR Chest Port 1 View    Narrative    EXAM: XR CHEST PORT 1 VW  11/12/2020 12:54 PM     HISTORY:  covid +, eval for worsening lung fields       COMPARISON:  4/18/2019    FINDINGS: Single view of the chest. Midline trachea. Moderate left  pleural effusion. Bilateral interstitial opacities. Cardiac pacer  device. No pneumothorax. Degenerative changes of the glenohumeral  joints.       Impression    IMPRESSION:   1. Bilateral interstitial opacities, likely infection although a  component of edema is not excluded.  2. Moderate left pleural effusion.    I have personally reviewed the examination and initial interpretation  and I agree with the findings.    INDER BELLAMY MD       Recent vital signs:   /62   Pulse 79   Temp 98.6  F (37  C) (Oral)   Resp 18   Ht 1.651 m (5' 5\")   Wt 100.2 kg (221 lb)   SpO2 97%   BMI 36.78 kg/m      [place] Coma Scale Score: 15 (11/12/20 1142)       Cardiac Rhythm: Normal Sinus  Pt needs tele? No  Skin/wound Issues: None    Code Status: DNR / DNI, see ACP docs.    Pain control: fair    Nausea control: fair    Abnormal labs/tests/findings requiring intervention: See results    Family present during ED course? No   Family Comments/Social Situation comments: n/a    Tasks needing completion: None    Sushila Aguillon, RN  [phone]      "

## 2020-11-12 NOTE — PLAN OF CARE
Pt admitted from detention with known positive COVID since 11/10. Pt A/Ox4, VSS on 1.5L NC. Pt was able to wean from 3L to 1.5L without difficulty, will continue to wean pt off O2. Pt is bed bound per pt. On reg diet, can feed herself. Skin check done by writer and MD paged and verbally told that pt has a pressure injury on coccyx, WOC consult to be placed. Pt took PO pills without issue. Prevalon boot ordered for pts RLE foot drop, pt requested to wait to use boot until after dinner. Purewick in place for pts incontinence. Pt incontinent of stool x1 on shift. Plan is to do a cardiac workup and pt may be able to discharge back to detention if oxygen continues to improve and pt hrt workup is neg. Will continue to monitor pt and follow POC.

## 2020-11-12 NOTE — ED PROVIDER NOTES
[place] EMERGENCY DEPARTMENT ([facility])  November 12, 2020  History     Chief Complaint   Patient presents with     Shortness of Breath     Low O2 sats at AL.      HPI  Ankita Pham is a 89 year old female with a PMH of SA node dysfunction, HTN, BRITTA, hypothyroid, chronic peptic ulcer, diaphragmatic hernia, heart disease, cardiac pacemaker in situ, osteoarthritis, sick sinus syndrome, and acute but ill-defined cerebrovascular disease who presents the ED today complaining of shortness of breath and generalized weakness.  Tested positive for Covid 2 days ago at her nursing facility.  She currently denies any fevers or chills.  No chest pain.  Feels generally weak.  No abdominal pain vomiting or diarrhea.    At baseline she is essentially bedbound and requires assistance with all cares.  During her interview she makes good eye contact and is conversant and able to answer questions appropriately.  Appears reliable.        I have reviewed the Medications, Allergies, Past Medical and Surgical History, and Social History in the [facility] system.  PAST MEDICAL HISTORY:   Past Medical History:   Diagnosis Date     Acute, but ill-defined, cerebrovascular disease 1975    incontinent     Cardiac dysrhythmia, unspecified     sees Dr. Marcus     Cardiac pacemaker in situ 1999     Chronic peptic ulcer, unspecified site, without mention of hemorrhage, perforation, or obstruction      Diaphragmatic hernia without mention of obstruction or gangrene      Edema      Esophageal reflux 12/13/2006     Essential hypertension, benign      Heart disease, unspecified      Irritable bowel syndrome      Lymphedema      Myalgia and myositis, unspecified      Open wound of heel 3/31/2014    Right heel.     Osteoarthrosis, unspecified whether generalized or localized, unspecified site     Dr. Ashly Palacios     Other abnormal glucose 2005     Other chronic pulmonary heart diseases      Rotator cuff (capsule) sprain     left, Dr. Paredes  didn't think surgery worth risks     Unspecified hypothyroidism      Unspecified hypothyroidism      Unspecified sleep apnea      Urinary incontinence 2014       PAST SURGICAL HISTORY:   Past Surgical History:   Procedure Laterality Date     ENDOSCOPIC RELEASE CARPAL TUNNEL  10/26/2011    Procedure:ENDOSCOPIC RELEASE CARPAL TUNNEL; RIGHT ENDOSCOPIC CARPAL TUNNEL RELEASE, RIGHT LONG AND RING A-1 JAMIL RELEASE; Surgeon:KATE MARRERO; Location:[facility]     INCISION AND DRAINAGE SACRAL WOUND, COMBINED N/A 2014    Procedure: COMBINED INCISION AND DRAINAGE SACRAL WOUND;  Surgeon: Osorio Morgan MD;  Location: [facility] OR     RELEASE TRIGGER FINGER  10/26/2011    Procedure:RELEASE TRIGGER FINGER; Surgeon:KATE MARRERO; Location:[facility]     SURGICAL HISTORY OF -       Ankle fracture Tib     SURGICAL HISTORY OF -       TIA'a     SURGICAL HISTORY OF -       Thyroiditis x3 remote     SURGICAL HISTORY OF -       Ulnar nerve transpostion     SURGICAL HISTORY OF -   1999    Pacemaker (sinus phases, bradycardia)     SURGICAL HISTORY OF -       L atrial pacer     SURGICAL HISTORY OF -       appy     SURGICAL HISTORY OF -       Benign breast biopsy       Past medical history, past surgical history, medications, and allergies were reviewed with the patient. Additional pertinent items: None    FAMILY HISTORY:   Family History   Problem Relation Age of Onset     Family History Negative Mother      Heart Disease Father      Heart Disease Sister         CHF     Heart Disease Brother         multiple MI     Respiratory Brother         COPD     Heart Disease Brother         MI     Heart Disease Brother          during open heart surgery     Diabetes No family hx of      Hypertension No family hx of        SOCIAL HISTORY:   Social History     Tobacco Use     Smoking status: Former Smoker     Years: 10.00     Quit date: 1966     Years since quittin.5     Smokeless tobacco: Never Used    Substance Use Topics     Alcohol use: Yes     Alcohol/week: 46.7 standard drinks     Comment: 4 oz of wine  each evening     Social history was reviewed with the patient. Additional pertinent items: None      Current Discharge Medication List      CONTINUE these medications which have NOT CHANGED    Details   acetaminophen (TYLENOL) 500 MG tablet Take 2 tablets (1,000 mg) by mouth 3 times daily  Qty: 60 tablet, Refills: 1    Associated Diagnoses: Chronic pain syndrome      albuterol (2.5 MG/3ML) 0.083% nebulizer solution Take 1 vial (2.5 mg) by nebulization every 6 hours as needed for shortness of breath / dyspnea or wheezing  Qty: 360 mL    Associated Diagnoses: Shortness of breath      ascorbic acid 500 MG TABS Take 1 tablet (500 mg) by mouth daily  Qty: 30 tablet      aspirin  MG tablet Take 325 mg by mouth daily   Qty: 40 tablet    Associated Diagnoses: Other specified cardiac dysrhythmias(427.89)      bisacodyl (DULCOLAX) 10 MG suppository Place 1 suppository (10 mg) rectally daily as needed for constipation  Qty: 30 suppository    Associated Diagnoses: Ileus (H)      Citalopram Hydrobromide (CELEXA PO) Take 10 mg by mouth daily      furosemide (LASIX) 40 MG tablet Take 1 tablet (40 mg) by mouth daily  Qty: 30 tablet    Associated Diagnoses: Cardiac pacemaker in situ      Gabapentin (NEURONTIN PO) Take 200 mg by mouth daily      hypromellose (ARTIFICIAL TEARS) 0.5 % SOLN Place 1 drop into both eyes 3 times daily      lactulose (CHRONULAC) 10 GM/15ML solution Take 30 mLs by mouth daily as needed for constipation      levothyroxine (SYNTHROID, LEVOTHROID) 75 MCG tablet Take 1 tablet (75 mcg) by mouth daily  Qty: 90 tablet, Refills: 3    Associated Diagnoses: Unspecified hypothyroidism      lisinopril (PRINIVIL/ZESTRIL) 5 MG tablet Take 1 tablet (5 mg) by mouth 2 times daily  Qty: 30 tablet    Associated Diagnoses: Hypertension goal BP (blood pressure) < 140/90      LORazepam (ATIVAN) 1 MG tablet Take 0.5  "tablets (0.5 mg) by mouth daily as needed for anxiety  Qty: 30 tablet, Refills: 0    Associated Diagnoses: Delirium      magnesium oxide (MAG-OX) 400 MG tablet Take 1 tablet (400 mg) by mouth daily  Qty: 60 tablet, Refills: 0    Associated Diagnoses: Ileus (H)      METOPROLOL SUCCINATE ER PO Take 12.5 mg by mouth At Bedtime      multivitamin  with lutein (OCUVITE WITH LTEIN) CAPS Take 1 capsule by mouth daily  Refills: 0    Associated Diagnoses: Physical deconditioning      omeprazole 20 MG tablet Take 1 tablet (20 mg) by mouth 2 times daily  Qty: 30 tablet    Associated Diagnoses: Abdominal pain, other specified site      Ondansetron HCl (ZOFRAN PO) Take 4 mg by mouth every 6 hours as needed for nausea or vomiting      oxyCODONE (ROXICODONE) 5 MG IR tablet Take 1 tablet (5 mg) by mouth every 8 hours as needed for moderate to severe pain  Qty: 40 tablet, Refills: 0    Associated Diagnoses: Epidural hematoma (H)      polyethylene glycol (MIRALAX/GLYCOLAX) Packet Take 17 g by mouth daily  Qty: 7 packet    Comments: Hold for loose stools  Associated Diagnoses: Slow transit constipation      potassium chloride SA (K-DUR,KLOR-CON M) 20 MEQ tablet Take 1 tablet (20 mEq) by mouth daily  Qty: 60 tablet    Associated Diagnoses: Ileus (H)      senna-docusate (SENOKOT-S;PERICOLACE) 8.6-50 MG per tablet Take 2 tablets by mouth 2 times daily  Qty: 100 tablet    Comments: Hold for loose stools  Associated Diagnoses: Slow transit constipation      SIMETHICONE-80 PO Take 80 mg by mouth 2 times daily as needed for intestinal gas in addition to scheduled doses                Allergies   Allergen Reactions     Cortisone Other (See Comments)     Mental changes      Nuts Anaphylaxis        Review of Systems   10 point review of symptoms was performed and is negative except as noted above.   Physical Exam   BP: (!) 134/102  Pulse: 70  Temp: 98.6  F (37  C)  Resp: 18  Height: 165.1 cm (5' 5\")  Weight: 100.2 kg (221 lb)  SpO2: 100 " %      Physical Exam   GEN: Well appearing, non toxic, cooperative and conversant.   HEENT: The head is normocephalic and atraumatic. Pupils are equal round and reactive to light. Extraocular motions are intact. There is no facial swelling. The neck is nontender and supple.   CV: Regular rate  PULM: Unlabored breathing obese,   ABD: Soft, nontender, nondistended.   EXT: Limited range of motion chronic, deconditioned significant right-sided lower extremity edema chronic per patient.  Bilateral ankle edema pitting chronic per ptient    SKIN: No new rashes, ecchymosis, or lacerations  PSYCH: Calm and cooperative, interactive.     ED Course        Procedures  Results for orders placed during the hospital encounter of 11/12/20   POC US CHEST B-SCAN    Impression Limited chest ultrasound for evaluation of COVID findings performed and interpreted by me  Indication: weakness, eval for covid19  Findings: Predominantly A- line pattern.  Scattered b-lines and subpleural effusions and a skip lesion type pattern.      Impression: Scattered B-lines and subpleural effusions consistent with viral pneumonia such as Covid19     POC US ECHO LIMITED    Impression Bedside, focused cardiac ultrasound performed and interpreted by me.    Indication: weakness    Parasternal long, parasternal short, apical four-chamber views were acquired for gross evaluation of global cardiac function, chamber size and pericardial effusion.  Image quality was fair. Global left ventricular function appears severely decreased. LA is large . .  There is no evidence of free fluid within the pericardium.     Impression: Bedside limited cardiac ultrasound showing decreased  left ventricular function and enrrique LA size.  There is no pericardial effusion.                  Results for orders placed or performed during the hospital encounter of 11/12/20 (from the past 24 hour(s))   CBC with platelets differential   Result Value Ref Range    WBC 4.8 4.0 - 11.0 10e9/L     RBC Count 4.92 3.8 - 5.2 10e12/L    Hemoglobin 15.4 11.7 - 15.7 g/dL    Hematocrit 49.4 (H) 35.0 - 47.0 %     78 - 100 fl    MCH 31.3 26.5 - 33.0 pg    MCHC 31.2 (L) 31.5 - 36.5 g/dL    RDW 14.8 10.0 - 15.0 %    Platelet Count 157 150 - 450 10e9/L    Diff Method Automated Method     % Neutrophils 58.2 %    % Lymphocytes 32.3 %    % Monocytes 8.1 %    % Eosinophils 0.4 %    % Basophils 0.2 %    % Immature Granulocytes 0.8 %    Nucleated RBCs 0 0 /100    Absolute Neutrophil 2.8 1.6 - 8.3 10e9/L    Absolute Lymphocytes 1.6 0.8 - 5.3 10e9/L    Absolute Monocytes 0.4 0.0 - 1.3 10e9/L    Absolute Eosinophils 0.0 0.0 - 0.7 10e9/L    Absolute Basophils 0.0 0.0 - 0.2 10e9/L    Abs Immature Granulocytes 0.0 0 - 0.4 10e9/L    Absolute Nucleated RBC 0.0    Comprehensive metabolic panel   Result Value Ref Range    Sodium 141 133 - 144 mmol/L    Potassium 3.2 (L) 3.4 - 5.3 mmol/L    Chloride 101 94 - 109 mmol/L    Carbon Dioxide 34 (H) 20 - 32 mmol/L    Anion Gap 5 3 - 14 mmol/L    Glucose 116 (H) 70 - 99 mg/dL    Urea Nitrogen 13 7 - 30 mg/dL    Creatinine 0.57 0.52 - 1.04 mg/dL    GFR Estimate 77 >60 mL/min/[1.73_m2]    GFR Estimate If Black 89 >60 mL/min/[1.73_m2]    Calcium 7.8 (L) 8.5 - 10.1 mg/dL    Bilirubin Total 0.4 0.2 - 1.3 mg/dL    Albumin 3.2 (L) 3.4 - 5.0 g/dL    Protein Total 6.9 6.8 - 8.8 g/dL    Alkaline Phosphatase 75 40 - 150 U/L    ALT 17 0 - 50 U/L    AST 21 0 - 45 U/L   Troponin I   Result Value Ref Range    Troponin I ES 0.041 0.000 - 0.045 ug/L   POC US ECHO LIMITED    Impression    Bedside, focused cardiac ultrasound performed and interpreted by me.    Indication: weakness    Parasternal long, parasternal short, apical four-chamber views were acquired for gross evaluation of global cardiac function, chamber size and pericardial effusion.  Image quality was fair. Global left ventricular function appears severely decreased. LA is large . .  There is no evidence of free fluid within the pericardium.      Impression: Bedside limited cardiac ultrasound showing decreased  left ventricular function and enrrique LA size.  There is no pericardial effusion.      POC US CHEST B-SCAN    Impression    Limited chest ultrasound for evaluation of COVID findings performed and interpreted by me  Indication: weakness, eval for covid19  Findings: Predominantly A- line pattern.  Scattered b-lines and subpleural effusions and a skip lesion type pattern.      Impression: Scattered B-lines and subpleural effusions consistent with viral pneumonia such as Covid19     Lactic acid   Result Value Ref Range    Lactic Acid 1.7 0.7 - 2.0 mmol/L   Nt probnp inpatient   Result Value Ref Range    N-Terminal Pro BNP Inpatient 2,940 (H) 0 - 1,800 pg/mL   ISTAT gases elec ica gluc demarcus POCT   Result Value Ref Range    Ph Venous 7.43 7.32 - 7.43 pH    PCO2 Venous 59 (H) 40 - 50 mm Hg    PO2 Venous 34 25 - 47 mm Hg    Bicarbonate Venous 39 (H) 21 - 28 mmol/L    O2 Sat Venous 64 %    Sodium 142 133 - 144 mmol/L    Potassium 3.5 3.4 - 5.3 mmol/L    Glucose 118 (H) 70 - 99 mg/dL    Calcium Ionized 4.1 (L) 4.4 - 5.2 mg/dL    Hemoglobin 16.7 (H) 11.7 - 15.7 g/dL    Hematocrit - POCT 49 (H) 35.0 - 47.0 %PCV   XR Chest Port 1 View    Narrative    EXAM: XR CHEST PORT 1 VW  11/12/2020 12:54 PM     HISTORY:  covid +, eval for worsening lung fields       COMPARISON:  4/18/2019    FINDINGS: Single view of the chest. Midline trachea. Moderate left  pleural effusion. Bilateral interstitial opacities. Cardiac pacer  device. No pneumothorax. Degenerative changes of the glenohumeral  joints.       Impression    IMPRESSION:   1. Bilateral interstitial opacities, likely infection although a  component of edema is not excluded.  2. Moderate left pleural effusion.    I have personally reviewed the examination and initial interpretation  and I agree with the findings.    INDER BELLAMY MD     Medications   aspirin (ASA) EC tablet 325 mg (has no administration in time range)    citalopram (celeXA) tablet 10 mg (has no administration in time range)   furosemide (LASIX) tablet 40 mg (has no administration in time range)   hypromellose (ARTIFICIAL TEARS) 0.5 % ophthalmic solution 1 drop (has no administration in time range)   lactulose (CHRONULAC) solution 30 mL (has no administration in time range)   levothyroxine (SYNTHROID/LEVOTHROID) tablet 75 mcg (has no administration in time range)   lisinopril (ZESTRIL) tablet 5 mg (has no administration in time range)   metoprolol succinate (TOPROL-XL) half-tab 12.5 mg (has no administration in time range)   multivitamin  with lutein (OCUVITE WITH LTEIN) per capsule 1 capsule (has no administration in time range)   magnesium oxide (MAG-OX) tablet 400 mg (has no administration in time range)   LORazepam (ATIVAN) tablet 0.5 mg (has no administration in time range)   polyethylene glycol (MIRALAX) Packet 17 g (has no administration in time range)   senna-docusate (SENOKOT-S/PERICOLACE) 8.6-50 MG per tablet 2 tablet (has no administration in time range)   simethicone (MYLICON) chewable tablet 80 mg (has no administration in time range)   lidocaine 1 % 0.1-1 mL (has no administration in time range)   lidocaine (LMX4) cream (has no administration in time range)   sodium chloride (PF) 0.9% PF flush 3 mL (has no administration in time range)   sodium chloride (PF) 0.9% PF flush 3 mL (has no administration in time range)   melatonin tablet 1 mg (has no administration in time range)   ondansetron (ZOFRAN-ODT) ODT tab 4 mg (has no administration in time range)     Or   ondansetron (ZOFRAN) injection 4 mg (has no administration in time range)   calcium carbonate (TUMS) chewable tablet 1,000 mg (has no administration in time range)   heparin ANTICOAGULANT injection 5,000 Units (has no administration in time range)   omeprazole (priLOSEC) CR capsule 20 mg (has no administration in time range)             Assessments & Plan (with Medical Decision Making)    89-year-old female with multiple chronic comorbid conditions, known ischemic heart failure with EF of 15 to 20% on echocardiogram 2 years ago, recent Covid diagnosis presenting with generalized weakness, mild shortness of breath.     per nursing facility report her sats dropped to the mid 70s.  In the emergency department she has been 91% on room air correcting to greater than 95% on 2 L nasal cannula.  On frequent reevaluation she has not.  Particularly dyspneic, has been hemodynamically stable and responsive and conversant.  Laboratories notable for BNP 2100, troponin 0.041.  Labs otherwise unrevealing    Chest x-ray with left pleural effusion and patchy infiltration edema versus infection  Her bedside echocardiogram shows decreased function uncertain but likely near her baseline  Lung ultrasound however shows no evidence of wet B-lines and in fact shows skip lesions with subpleural effusions consistent with Covid infection or other viral pneumonia    Given her clinical presentation, mild hypoxia, generalized weakness and comorbidities she is appropriate for admission for COVID-19 inpatient observation and treatment  Patient agrees with this plan              I have reviewed the nursing notes.    I have reviewed the findings, diagnosis, plan and need for follow up with the patient.    Current Discharge Medication List          Final diagnoses:   None   Jeremy MELCHOR, am serving as a trained medical scribe to document services personally performed by Andrew Weaver MD, based on the provider's statements to me.     Andrew MELCHOR MD, was physically present and have reviewed and verified the accuracy of this note documented by Jeremy Genao.      11/12/2020   [facility] EMERGENCY DEPARTMENT     Andrew Weaver MD  11/12/20 [id number]

## 2020-11-13 LAB
ANION GAP SERPL CALCULATED.3IONS-SCNC: 5 MMOL/L (ref 3–14)
BASOPHILS # BLD AUTO: 0 10E9/L (ref 0–0.2)
BASOPHILS NFR BLD AUTO: 0.2 %
BUN SERPL-MCNC: 13 MG/DL (ref 7–30)
CALCIUM SERPL-MCNC: 7.8 MG/DL (ref 8.5–10.1)
CHLORIDE SERPL-SCNC: 99 MMOL/L (ref 94–109)
CO2 SERPL-SCNC: 35 MMOL/L (ref 20–32)
CREAT SERPL-MCNC: 0.55 MG/DL (ref 0.52–1.04)
CRP SERPL-MCNC: 26 MG/L (ref 0–8)
D DIMER PPP FEU-MCNC: 1 UG/ML FEU (ref 0–0.5)
DIFFERENTIAL METHOD BLD: ABNORMAL
EOSINOPHIL # BLD AUTO: 0 10E9/L (ref 0–0.7)
EOSINOPHIL NFR BLD AUTO: 0.2 %
ERYTHROCYTE [DISTWIDTH] IN BLOOD BY AUTOMATED COUNT: 14.6 % (ref 10–15)
FIBRINOGEN PPP-MCNC: 429 MG/DL (ref 200–420)
GFR SERPL CREATININE-BSD FRML MDRD: 78 ML/MIN/{1.73_M2}
GLUCOSE SERPL-MCNC: 92 MG/DL (ref 70–99)
HCT VFR BLD AUTO: 45.2 % (ref 35–47)
HGB BLD-MCNC: 14.2 G/DL (ref 11.7–15.7)
IMM GRANULOCYTES # BLD: 0 10E9/L (ref 0–0.4)
IMM GRANULOCYTES NFR BLD: 0.4 %
INR PPP: 1.03 (ref 0.86–1.14)
LDH SERPL L TO P-CCNC: 222 U/L (ref 81–234)
LYMPHOCYTES # BLD AUTO: 1.2 10E9/L (ref 0.8–5.3)
LYMPHOCYTES NFR BLD AUTO: 23.4 %
MAGNESIUM SERPL-MCNC: 1.4 MG/DL (ref 1.6–2.3)
MAGNESIUM SERPL-MCNC: 1.4 MG/DL (ref 1.6–2.3)
MCH RBC QN AUTO: 31.6 PG (ref 26.5–33)
MCHC RBC AUTO-ENTMCNC: 31.4 G/DL (ref 31.5–36.5)
MCV RBC AUTO: 101 FL (ref 78–100)
MONOCYTES # BLD AUTO: 0.7 10E9/L (ref 0–1.3)
MONOCYTES NFR BLD AUTO: 12.7 %
NEUTROPHILS # BLD AUTO: 3.3 10E9/L (ref 1.6–8.3)
NEUTROPHILS NFR BLD AUTO: 63.1 %
NRBC # BLD AUTO: 0 10*3/UL
NRBC BLD AUTO-RTO: 0 /100
PLATELET # BLD AUTO: 139 10E9/L (ref 150–450)
POTASSIUM SERPL-SCNC: 2.7 MMOL/L (ref 3.4–5.3)
POTASSIUM SERPL-SCNC: 2.8 MMOL/L (ref 3.4–5.3)
POTASSIUM SERPL-SCNC: 3.5 MMOL/L (ref 3.4–5.3)
RBC # BLD AUTO: 4.49 10E12/L (ref 3.8–5.2)
RETICS # AUTO: 57.5 10E9/L (ref 25–95)
RETICS/RBC NFR AUTO: 1.3 % (ref 0.5–2)
SODIUM SERPL-SCNC: 139 MMOL/L (ref 133–144)
WBC # BLD AUTO: 5.2 10E9/L (ref 4–11)

## 2020-11-13 PROCEDURE — 84132 ASSAY OF SERUM POTASSIUM: CPT | Performed by: NURSE PRACTITIONER

## 2020-11-13 PROCEDURE — 250N000011 HC RX IP 250 OP 636: Performed by: PHYSICIAN ASSISTANT

## 2020-11-13 PROCEDURE — 85379 FIBRIN DEGRADATION QUANT: CPT | Performed by: INTERNAL MEDICINE

## 2020-11-13 PROCEDURE — 36415 COLL VENOUS BLD VENIPUNCTURE: CPT | Performed by: NURSE PRACTITIONER

## 2020-11-13 PROCEDURE — 99233 SBSQ HOSP IP/OBS HIGH 50: CPT | Performed by: NURSE PRACTITIONER

## 2020-11-13 PROCEDURE — 120N000002 HC R&B MED SURG/OB UMMC

## 2020-11-13 PROCEDURE — 250N000012 HC RX MED GY IP 250 OP 636 PS 637: Performed by: NURSE PRACTITIONER

## 2020-11-13 PROCEDURE — 85610 PROTHROMBIN TIME: CPT | Performed by: INTERNAL MEDICINE

## 2020-11-13 PROCEDURE — G0463 HOSPITAL OUTPT CLINIC VISIT: HCPCS

## 2020-11-13 PROCEDURE — 86140 C-REACTIVE PROTEIN: CPT | Performed by: INTERNAL MEDICINE

## 2020-11-13 PROCEDURE — 83735 ASSAY OF MAGNESIUM: CPT | Performed by: NURSE PRACTITIONER

## 2020-11-13 PROCEDURE — 85045 AUTOMATED RETICULOCYTE COUNT: CPT | Performed by: INTERNAL MEDICINE

## 2020-11-13 PROCEDURE — 83735 ASSAY OF MAGNESIUM: CPT | Performed by: INTERNAL MEDICINE

## 2020-11-13 PROCEDURE — 84132 ASSAY OF SERUM POTASSIUM: CPT | Performed by: PHYSICIAN ASSISTANT

## 2020-11-13 PROCEDURE — 83615 LACTATE (LD) (LDH) ENZYME: CPT | Performed by: INTERNAL MEDICINE

## 2020-11-13 PROCEDURE — 36415 COLL VENOUS BLD VENIPUNCTURE: CPT | Performed by: INTERNAL MEDICINE

## 2020-11-13 PROCEDURE — 250N000013 HC RX MED GY IP 250 OP 250 PS 637: Performed by: NURSE PRACTITIONER

## 2020-11-13 PROCEDURE — 85384 FIBRINOGEN ACTIVITY: CPT | Performed by: INTERNAL MEDICINE

## 2020-11-13 PROCEDURE — 80048 BASIC METABOLIC PNL TOTAL CA: CPT | Performed by: INTERNAL MEDICINE

## 2020-11-13 PROCEDURE — 250N000013 HC RX MED GY IP 250 OP 250 PS 637: Performed by: PHYSICIAN ASSISTANT

## 2020-11-13 PROCEDURE — 85025 COMPLETE CBC W/AUTO DIFF WBC: CPT | Performed by: INTERNAL MEDICINE

## 2020-11-13 PROCEDURE — 250N000009 HC RX 250: Performed by: INTERNAL MEDICINE

## 2020-11-13 PROCEDURE — 36415 COLL VENOUS BLD VENIPUNCTURE: CPT | Performed by: PHYSICIAN ASSISTANT

## 2020-11-13 RX ORDER — POTASSIUM CHLORIDE 750 MG/1
20 TABLET, EXTENDED RELEASE ORAL ONCE
Status: DISCONTINUED | OUTPATIENT
Start: 2020-11-13 | End: 2020-11-18 | Stop reason: HOSPADM

## 2020-11-13 RX ORDER — POTASSIUM CHLORIDE 750 MG/1
40 TABLET, EXTENDED RELEASE ORAL ONCE
Status: COMPLETED | OUTPATIENT
Start: 2020-11-13 | End: 2020-11-13

## 2020-11-13 RX ORDER — POTASSIUM CHLORIDE 750 MG/1
40 TABLET, EXTENDED RELEASE ORAL ONCE
Status: DISCONTINUED | OUTPATIENT
Start: 2020-11-13 | End: 2020-11-18 | Stop reason: HOSPADM

## 2020-11-13 RX ORDER — MAGNESIUM OXIDE 400 MG/1
400 TABLET ORAL 3 TIMES DAILY
Status: COMPLETED | OUTPATIENT
Start: 2020-11-13 | End: 2020-11-15

## 2020-11-13 RX ORDER — POTASSIUM CHLORIDE 750 MG/1
20 TABLET, EXTENDED RELEASE ORAL ONCE
Status: COMPLETED | OUTPATIENT
Start: 2020-11-13 | End: 2020-11-13

## 2020-11-13 RX ADMIN — Medication 1 CAPSULE: at 07:59

## 2020-11-13 RX ADMIN — LEVOTHYROXINE SODIUM 75 MCG: 0.07 TABLET ORAL at 07:59

## 2020-11-13 RX ADMIN — OMEPRAZOLE 20 MG: 20 CAPSULE, DELAYED RELEASE ORAL at 15:14

## 2020-11-13 RX ADMIN — ASPIRIN 325 MG: 325 TABLET, COATED ORAL at 07:59

## 2020-11-13 RX ADMIN — HYPROMELLOSE 2906 (4000 MPA.S) AND HYPROMELLOSE 2906 (50 MPA.S) 1 DROP: 25; 25 SOLUTION OPHTHALMIC at 08:01

## 2020-11-13 RX ADMIN — DOCUSATE SODIUM 50 MG AND SENNOSIDES 8.6 MG 2 TABLET: 8.6; 5 TABLET, FILM COATED ORAL at 20:05

## 2020-11-13 RX ADMIN — LISINOPRIL 5 MG: 5 TABLET ORAL at 20:05

## 2020-11-13 RX ADMIN — HEPARIN SODIUM 5000 UNITS: 10000 INJECTION, SOLUTION INTRAVENOUS; SUBCUTANEOUS at 08:00

## 2020-11-13 RX ADMIN — MAGNESIUM OXIDE 400 MG: 400 TABLET ORAL at 07:59

## 2020-11-13 RX ADMIN — CITALOPRAM HYDROBROMIDE 10 MG: 10 TABLET ORAL at 07:59

## 2020-11-13 RX ADMIN — MAGNESIUM OXIDE 400 MG: 400 TABLET ORAL at 20:04

## 2020-11-13 RX ADMIN — DEXAMETHASONE 6 MG: 2 TABLET ORAL at 15:14

## 2020-11-13 RX ADMIN — MAGNESIUM OXIDE 400 MG: 400 TABLET ORAL at 15:14

## 2020-11-13 RX ADMIN — POTASSIUM CHLORIDE 20 MEQ: 750 TABLET, EXTENDED RELEASE ORAL at 15:14

## 2020-11-13 RX ADMIN — POTASSIUM CHLORIDE 40 MEQ: 750 TABLET, EXTENDED RELEASE ORAL at 13:55

## 2020-11-13 RX ADMIN — HEPARIN SODIUM 5000 UNITS: 10000 INJECTION, SOLUTION INTRAVENOUS; SUBCUTANEOUS at 20:04

## 2020-11-13 RX ADMIN — OMEPRAZOLE 20 MG: 20 CAPSULE, DELAYED RELEASE ORAL at 08:01

## 2020-11-13 RX ADMIN — FUROSEMIDE 40 MG: 20 TABLET ORAL at 08:01

## 2020-11-13 ASSESSMENT — ACTIVITIES OF DAILY LIVING (ADL)
ADLS_ACUITY_SCORE: 33

## 2020-11-13 NOTE — PLAN OF CARE
VSS on 4 liters, attempted to wean oxygen to 2 liters but desats 88% on 2 liters so back to 4 liters, pt refusing repositioning most of day, encouraging repo, pt has pure wick in place draining dark jose colored urine, fair po intake, coccyx reddened and WOCN nurse consulted, she feels this is moisture related, barrier cream and mepilex applied, no BM today, denies pain, withdrawn and flat affect, infrequent nonproductive cough, lungs sounds course, potassium 2.7, replaced with redraw this ricardo, mag 1.4, replaced with redraw tomorrow a.m.

## 2020-11-13 NOTE — CONSULTS
WO Nurse Inpatient Wound Assessment   Reason for consultation: Evaluate and treat  Coccyx wounds- Wounds are in sacral dimple/ old healed sacral decubitus ulcer (8/14)    Assessment  Sacral wounds due to Moisture Associated Skin Damage (MASD)  Status: initial assessment    Treatment Plan  Sacral wounds: Every 3 days     Cleanse the area with NS and pat dry.    Apply No sting film barrier to periwound skin.    Apply Criticaid paste to deficit and open wounds    Cover wound with Mepilex, fold in half when applying to make sure deficit comes in contact with Mepilex.     Change dressing Q 3 days.    FYI- If pt has constant incontinent loose stools needing dressing changes Q shift, or is unable to get Mepilex to stay on, please discontinue the Mepilex dressing and apply criticaid barrier paste BID and PRN.    Pressure injury prevention    Turn and reposition Q 2hrs and PRN.    Keep HOB less than or equal to 30 degrees as medically able to decrease pressure, friction, shearing.    Ensure pt has Gonzalez-cushion while sitting up in the chair.    Elevate heels off the bed using pillows or heel boots.    Apply barrier cream PRN for incontinence and minimize use of diapers.    Limit linen and chux use under patient, using as few layers as possible.       Orders Written  Recommended provider order: None, at this time  WO Nurse follow-up plan:weekly  Nursing to notify the Provider(s) and re-consult the WO Nurse if wound(s) deteriorates or new skin concern.    Patient History  According to provider note(s):  Ankita Pham is a 89 year old female with past medical history significant for IBS, chronic lymphedema bilateral lower extremities, atrial fib s/p PPM, severe HFrEF (15%), epidural hematoma (2016), GERD, hypothyroidism, and depression admitted from her SNF for COVID 19 infection, dyspnea, and hypoxia    Objective Data  Containment of urine/stool: Incontinence Protocol, Incontinent pad in bed and External catheter    Active  Diet Order  Orders Placed This Encounter      Combination Diet Regular Diet Adult      Output:   I/O last 3 completed shifts:  In: -   Out: 550 [Urine:550]    Risk Assessment:   Sensory Perception: 3-->slightly limited  Moisture: 4-->rarely moist  Activity: 2-->chairfast  Mobility: 2-->very limited  Nutrition: 2-->probably inadequate  Friction and Shear: 1-->problem  Marcelo Score: 14                          Labs:   Recent Labs   Lab 11/13/20  0743 11/12/20  1132 11/12/20  1132   ALBUMIN  --   --  3.2*   HGB 14.2   < > 15.4   INR 1.03  --   --    WBC 5.2  --  4.8   CRP 26.0*  --   --     < > = values in this interval not displayed.       Physical Exam  Areas of skin assessed: focused sacrum, coccyx    Wound Location:  Sacrum    (head side of photo is where ruler edge is)  Sacral dimple/ old healed wound 11/13    Date of last photo 11/13  Wound History: Present on admission. Patient had sacral decubitus ulcer (8/14) now healed, but edges have some linear cracks.    Wound Base: 100 % dermis     Palpation of the wound bed: normal      Drainage: scant     Description of drainage: serosanguinous     Measurements (length x width x depth, in cm) 12 o'clock 0.6 x 0.1x 0.1 cm; 6 o'clock 0.6 x 0.1 x 0.1cm; and another small open area of 0.4 x 0.1 x 0.1  Periwound skin: fragile scar tissue, mild maceration      Color: pale and pink      Temperature: normal   Odor: none  Pain: no grimacing or signs of discomfort, none      Interventions  Visual inspection and assessment completed   Wound Care Rationale Protect periwound skin, Improve absorptive capacity and Provide protection   Wound Care: done per plan of care  Supplies: at bedside and discussed with RN  Current off-loading measures: Pillows  Current support surface: Standard  Atmos Air mattress per RN pulsate mattress ordered but patient unwilling to switch at this time will attempt to switch again later.   Education provided to: importance of repositioning, plan of care and  Moisture management  Discussed plan of care with Nurse    Romana Hines RN, CWOCN

## 2020-11-13 NOTE — PROGRESS NOTES
[facility]     Medicine Progress Note - Hospitalist Service, Gold 4       Date of Admission:  11/12/2020  Assessment & Plan         Ankita Pham is a 89 year old female with past medical history significant for IBS, chronic lymphedema bilateral lower extremities, atrial fib s/p PPM, severe HFrEF (15%), epidural hematoma (2016), GERD, hypothyroidism, and depression admitted from her SNF for COVID 19 infection, dyspnea, and hypoxia.      # Acute hypoxic respiratory failure   # COVID 19   Found to be COVID+ at her nursing home 3 days ago.  Developed new supplemental O2 requirements and transferred to the hospital.  CXR on 11/12 w/ bilateral interstitial opacities likely 2/2 infection vs edema and moderate left pleural effusion.  Initially on 2L NC, now requiring 4L. CRP 26, , trop 0.041.    - Wean O2 as tolerated  - Start Dexamethasone 6mg x 10 days   - Not a candidate for monoclonal AB therapy  - Albuterol PRN   - Daily COVID labs     # HFrEF (15%)  # Moderate L sided pleural effusion   # HTN   # Atrial fib s/p PPM  - Continue PTA Lisinopril, Metoprolol w/ hold parameters   - Continue Lasix 40mg daily  - Continue ASA 325mg daily   - Daily weights   - Trend lytes as below     # Hypokalemia - K 2.8 this AM.  Mag 1.4.  Possibly 2/2 poor PO intake vs diuresis.    - Electrolyte replacement protocols ordered   - Daily Mag, Phos, BMP to trend lytes     # Chronic bilateral lower extremity edema - Stable.  Not mobile at her living facility.      # GERD - Continue PTA PPI daily     # Hypothyroidism - Continue PTA Levothyroxine 75mcg    # Depression - Continue PTA Celexa 10mg        Diet: Combination Diet Regular Diet Adult    DVT Prophylaxis: Heparin subQ   [place] Catheter: not present  Code Status: No CPR- Do NOT Intubate           Disposition Plan   Expected discharge: 2 - 3 days, recommended to prior living arrangement once stable O2 requirements.  Entered: Claudia Corona  APRN CNP 11/13/2020, 7:35 AM       The patient's care was discussed with the Attending Physician, Dr. Onur Bowen.    RONEN Jain CNP  Hospitalist Service, [address]   Contact information available via [facility] Paging/Directory  Please see sign in/sign out for up to date coverage information  ______________________________________________________________________    Interval History   Ankita is resting in bed.  She doesn't interact with me, sleepy.  Drifts off while I'm speaking with her.  No acute distress.     Data reviewed today: I reviewed all medications, new labs and imaging results over the last 24 hours.     Physical Exam   Vital Signs: Temp: 95.8  F (35.4  C) Temp src: Oral BP: 96/60 Pulse: 78   Resp: 20 SpO2: 92 % O2 Device: Nasal cannula with humidification Oxygen Delivery: 4 LPM  Weight: 221 lbs 0 oz    GENERAL: Drowsy. Well nourished, well developed.  No acute distress.    HEENT: Normocephalic, atraumatic. Anicteric sclera. Mucous membranes moist.   CV: RRR. S1, S2. No murmurs appreciated.   RESPIRATORY: Effort slightly inc on 3L. Lungs diminished at bases, possible due to body habitus.   GI: Abdomen soft but distended, bowel sounds present x all 4 quadrants. No tenderness, rebound, or guarding.   NEUROLOGICAL: No focal deficits. Moving toes and fingers.  MUSCULOSKELETAL: No joint swelling or tenderness. Moves all extremities.   EXTREMITIES: No gross deformities. Lymphedema of all extremities  SKIN: Grossly warm, dry, and intact. No jaundice. No rashes.       Data   Recent Labs   Lab 11/13/20  0743 11/12/20  1134 11/12/20  1132   WBC 5.2  --  4.8   HGB 14.2 16.7* 15.4   *  --  100   *  --  157   INR 1.03  --   --     142 141   POTASSIUM 2.8* 3.5 3.2*   CHLORIDE 99  --  101   CO2 35*  --  34*   BUN 13  --  13   CR 0.55  --  0.57   ANIONGAP 5  --  5   OMERO 7.8*  --  7.8*   GLC 92 118* 116*   ALBUMIN  --   --  3.2*   PROTTOTAL   --   --  6.9   BILITOTAL  --   --  0.4   ALKPHOS  --   --  75   ALT  --   --  17   AST  --   --  21   TROPI  --   --  0.041     No results found for this or any previous visit (from the past 24 hour(s)).  Medications       aspirin  325 mg Oral Daily     citalopram  10 mg Oral Daily     furosemide  10 mg Intravenous Once     furosemide  40 mg Oral Daily     heparin ANTICOAGULANT  5,000 Units Subcutaneous Q12H     hypromellose  1 drop Both Eyes TID     levothyroxine  75 mcg Oral Daily     lisinopril  5 mg Oral BID     magnesium oxide  400 mg Oral Daily     multivitamin  with lutein  1 capsule Oral Daily     omeprazole  20 mg Oral BID AC     polyethylene glycol  17 g Oral Daily     potassium chloride  20 mEq Oral Once     potassium chloride  20 mEq Oral Once     potassium chloride  40 mEq Oral Once     senna-docusate  2 tablet Oral BID     sodium chloride (PF)  3 mL Intracatheter Q8H

## 2020-11-13 NOTE — CONSULTS
Care Management Initial Consult    General Information  Assessment completed with: Chart review, [facility] admissions (ph [phone])  Type of CM/SW Visit: Initial Assessment  Primary Care Provider verified and updated as needed: No   Readmission within the last 30 days: no previous admission in last 30 days   Advance Care Planning: Advance Care Planning Reviewed: present on chart  (Syed Schofield is HCA)    Communication Assessment  Patient's communication style: spoken language (English or Bilingual)    Hearing Difficulty or Deaf: yes   Wear Glasses or Blind: no    Cognitive  Cognitive/Neuro/Behavioral: WDL                      Living Environment:   People in home: facility resident     Current living Arrangements: extended care facility  Name of Facility: ([facility])   Able to return to prior arrangements: yes     Family/Social Support:  Care provided by: other (see comments)(facility staff)  Provides care for: no one, unable/limited ability to care for self    Current Resources:   Skilled Home Care Services:  N/A, from [facility]   Community Resources: Skilled Nursing Facility  Equipment currently used at home: lift device  Supplies currently used at home:  N/A, from [facility]    Employment/Financial:  Financial Concerns: No concerns identified      Lifestyle & Psychosocial Needs:        Socioeconomic History     Marital status:      Spouse name: Not on file     Number of children: Not on file     Years of education: Not on file     Highest education level: Not on file       Functional Status:  Prior to admission patient needed assistance: LTC resident    Values/Beliefs:  Spiritual, Cultural Beliefs, Latter day Practices, Values that affect care: yes  Description of Beliefs that Will Affect Care: (Yazidism per facesheet)            Additional Information:  Pt is a 89-year-old female admitted to [facility] 11/12/20 with shortness of breath and hypoxia, found to be COVID positive at nursing facility  PTA.    Attempted to speak with pt via phone- no answer. Per chart review pt residing at [facility]. Spoke with Maryam in admissions (ph [phone], fax [phone]). Pt is LTC resident with bed hold, currently has been in COVID isolation unit at facility. Discussed that pt currently on 4L O2- per Maryam they can manage this. Per primary team pt may be stable for discharge tomorrow, 11/13. Updated Maryam.    Pt will require [facility] (ph [phone]) stretcher w/ COVID precautions at discharge.    ALEX Richter, [facility]    [facility]  Pager [phone]  Phone [phone]

## 2020-11-13 NOTE — PLAN OF CARE
"Time: 1900-0730    Reason for admission/Dx:   COVID-19 [U07.1]     /62   Pulse 80   Temp 97.7  F (36.5  C) (Axillary)   Resp 18   Ht 1.651 m (5' 5\")   Wt 100.2 kg (221 lb)   SpO2 93%   BMI 36.78 kg/m      Shift changes: VSS, ex O2; pt found to be maintaining saturations 88-89% on RA , with subsequent improvement on 1L while awake, however during NOC pt found to be as low as 77% on 1L NC, while asleep; thereafter titrated to 4L with 90%+ saturations. Overall, pt denies SOB or CP. AOx4, [place], lift pt, as pt does not ambulate at BAO, at baseline. Pt refusing turns. Refusing transfer to Pulsate mattress. Denies pain/nausea. Tolerating regular diet and PO meds well.1x BM. Voiding in Purewick, 555mL of brown, malodorous UOP. PO lasix. Limited US ECHO showing severely decreased LV function.     Plan: Wean O2. WOC. Possible discharge today 11/13.     "

## 2020-11-14 LAB
ANION GAP SERPL CALCULATED.3IONS-SCNC: 3 MMOL/L (ref 3–14)
BASOPHILS # BLD AUTO: 0 10E9/L (ref 0–0.2)
BASOPHILS NFR BLD AUTO: 0 %
BUN SERPL-MCNC: 19 MG/DL (ref 7–30)
CALCIUM SERPL-MCNC: 7.8 MG/DL (ref 8.5–10.1)
CHLORIDE SERPL-SCNC: 101 MMOL/L (ref 94–109)
CO2 SERPL-SCNC: 37 MMOL/L (ref 20–32)
CREAT SERPL-MCNC: 0.59 MG/DL (ref 0.52–1.04)
CRP SERPL-MCNC: 30 MG/L (ref 0–8)
D DIMER PPP FEU-MCNC: 1.1 UG/ML FEU (ref 0–0.5)
DIFFERENTIAL METHOD BLD: ABNORMAL
EOSINOPHIL # BLD AUTO: 0 10E9/L (ref 0–0.7)
EOSINOPHIL NFR BLD AUTO: 0 %
ERYTHROCYTE [DISTWIDTH] IN BLOOD BY AUTOMATED COUNT: 14.6 % (ref 10–15)
FIBRINOGEN PPP-MCNC: 380 MG/DL (ref 200–420)
GFR SERPL CREATININE-BSD FRML MDRD: 76 ML/MIN/{1.73_M2}
GLUCOSE SERPL-MCNC: 115 MG/DL (ref 70–99)
HCT VFR BLD AUTO: 44.8 % (ref 35–47)
HGB BLD-MCNC: 14.1 G/DL (ref 11.7–15.7)
IMM GRANULOCYTES # BLD: 0 10E9/L (ref 0–0.4)
IMM GRANULOCYTES NFR BLD: 0.7 %
INR PPP: 0.96 (ref 0.86–1.14)
LDH SERPL L TO P-CCNC: 188 U/L (ref 81–234)
LYMPHOCYTES # BLD AUTO: 0.7 10E9/L (ref 0.8–5.3)
LYMPHOCYTES NFR BLD AUTO: 25.8 %
MAGNESIUM SERPL-MCNC: 1.8 MG/DL (ref 1.6–2.3)
MCH RBC QN AUTO: 31.7 PG (ref 26.5–33)
MCHC RBC AUTO-ENTMCNC: 31.5 G/DL (ref 31.5–36.5)
MCV RBC AUTO: 101 FL (ref 78–100)
MONOCYTES # BLD AUTO: 0.3 10E9/L (ref 0–1.3)
MONOCYTES NFR BLD AUTO: 9.9 %
NEUTROPHILS # BLD AUTO: 1.8 10E9/L (ref 1.6–8.3)
NEUTROPHILS NFR BLD AUTO: 63.6 %
NRBC # BLD AUTO: 0 10*3/UL
NRBC BLD AUTO-RTO: 0 /100
PHOSPHATE SERPL-MCNC: 3.2 MG/DL (ref 2.5–4.5)
PLATELET # BLD AUTO: 136 10E9/L (ref 150–450)
POTASSIUM SERPL-SCNC: 3.3 MMOL/L (ref 3.4–5.3)
POTASSIUM SERPL-SCNC: 3.4 MMOL/L (ref 3.4–5.3)
RBC # BLD AUTO: 4.45 10E12/L (ref 3.8–5.2)
RETICS # AUTO: 53 10E9/L (ref 25–95)
RETICS/RBC NFR AUTO: 1.2 % (ref 0.5–2)
SODIUM SERPL-SCNC: 141 MMOL/L (ref 133–144)
WBC # BLD AUTO: 2.8 10E9/L (ref 4–11)

## 2020-11-14 PROCEDURE — 85384 FIBRINOGEN ACTIVITY: CPT | Performed by: INTERNAL MEDICINE

## 2020-11-14 PROCEDURE — 85045 AUTOMATED RETICULOCYTE COUNT: CPT | Performed by: INTERNAL MEDICINE

## 2020-11-14 PROCEDURE — 250N000013 HC RX MED GY IP 250 OP 250 PS 637: Performed by: NURSE PRACTITIONER

## 2020-11-14 PROCEDURE — 250N000013 HC RX MED GY IP 250 OP 250 PS 637: Performed by: PHYSICIAN ASSISTANT

## 2020-11-14 PROCEDURE — 85379 FIBRIN DEGRADATION QUANT: CPT | Performed by: INTERNAL MEDICINE

## 2020-11-14 PROCEDURE — 99233 SBSQ HOSP IP/OBS HIGH 50: CPT | Performed by: NURSE PRACTITIONER

## 2020-11-14 PROCEDURE — 80048 BASIC METABOLIC PNL TOTAL CA: CPT | Performed by: INTERNAL MEDICINE

## 2020-11-14 PROCEDURE — 83615 LACTATE (LD) (LDH) ENZYME: CPT | Performed by: INTERNAL MEDICINE

## 2020-11-14 PROCEDURE — 83735 ASSAY OF MAGNESIUM: CPT | Performed by: INTERNAL MEDICINE

## 2020-11-14 PROCEDURE — 85025 COMPLETE CBC W/AUTO DIFF WBC: CPT | Performed by: INTERNAL MEDICINE

## 2020-11-14 PROCEDURE — 250N000012 HC RX MED GY IP 250 OP 636 PS 637: Performed by: NURSE PRACTITIONER

## 2020-11-14 PROCEDURE — 84100 ASSAY OF PHOSPHORUS: CPT | Performed by: INTERNAL MEDICINE

## 2020-11-14 PROCEDURE — 36415 COLL VENOUS BLD VENIPUNCTURE: CPT | Performed by: PHYSICIAN ASSISTANT

## 2020-11-14 PROCEDURE — 86140 C-REACTIVE PROTEIN: CPT | Performed by: INTERNAL MEDICINE

## 2020-11-14 PROCEDURE — 84132 ASSAY OF SERUM POTASSIUM: CPT | Performed by: PHYSICIAN ASSISTANT

## 2020-11-14 PROCEDURE — 99207 PR CDG-MDM COMPONENT: MEETS MODERATE - UP CODED: CPT | Performed by: NURSE PRACTITIONER

## 2020-11-14 PROCEDURE — 120N000002 HC R&B MED SURG/OB UMMC

## 2020-11-14 PROCEDURE — 85610 PROTHROMBIN TIME: CPT | Performed by: INTERNAL MEDICINE

## 2020-11-14 PROCEDURE — 36415 COLL VENOUS BLD VENIPUNCTURE: CPT | Performed by: INTERNAL MEDICINE

## 2020-11-14 PROCEDURE — 250N000011 HC RX IP 250 OP 636: Performed by: PHYSICIAN ASSISTANT

## 2020-11-14 RX ADMIN — MAGNESIUM OXIDE 400 MG: 400 TABLET ORAL at 20:24

## 2020-11-14 RX ADMIN — CITALOPRAM HYDROBROMIDE 10 MG: 10 TABLET ORAL at 08:53

## 2020-11-14 RX ADMIN — HYPROMELLOSE 2906 (4000 MPA.S) AND HYPROMELLOSE 2906 (50 MPA.S) 1 DROP: 25; 25 SOLUTION OPHTHALMIC at 09:01

## 2020-11-14 RX ADMIN — MAGNESIUM OXIDE 400 MG: 400 TABLET ORAL at 14:06

## 2020-11-14 RX ADMIN — LISINOPRIL 5 MG: 5 TABLET ORAL at 20:24

## 2020-11-14 RX ADMIN — HEPARIN SODIUM 5000 UNITS: 10000 INJECTION, SOLUTION INTRAVENOUS; SUBCUTANEOUS at 20:24

## 2020-11-14 RX ADMIN — ASPIRIN 325 MG: 325 TABLET, COATED ORAL at 08:52

## 2020-11-14 RX ADMIN — FUROSEMIDE 40 MG: 20 TABLET ORAL at 08:52

## 2020-11-14 RX ADMIN — Medication 1 CAPSULE: at 08:52

## 2020-11-14 RX ADMIN — OMEPRAZOLE 20 MG: 20 CAPSULE, DELAYED RELEASE ORAL at 08:53

## 2020-11-14 RX ADMIN — LISINOPRIL 5 MG: 5 TABLET ORAL at 08:53

## 2020-11-14 RX ADMIN — MAGNESIUM OXIDE 400 MG: 400 TABLET ORAL at 08:53

## 2020-11-14 RX ADMIN — LEVOTHYROXINE SODIUM 75 MCG: 0.07 TABLET ORAL at 08:52

## 2020-11-14 RX ADMIN — DEXAMETHASONE 6 MG: 2 TABLET ORAL at 08:52

## 2020-11-14 RX ADMIN — OMEPRAZOLE 20 MG: 20 CAPSULE, DELAYED RELEASE ORAL at 16:17

## 2020-11-14 RX ADMIN — HEPARIN SODIUM 5000 UNITS: 10000 INJECTION, SOLUTION INTRAVENOUS; SUBCUTANEOUS at 08:53

## 2020-11-14 ASSESSMENT — ACTIVITIES OF DAILY LIVING (ADL)
ADLS_ACUITY_SCORE: 33

## 2020-11-14 NOTE — PLAN OF CARE
Time: 8978-7871    Reason for admission: COVID-19 +    Vitals: Afebrile, VSS on 2L oxymask -- pt is frequently sleeping and primarily breathes by mouth so oxymask works better than NC    Pt is lethargic, easily aroused and oriented x4. Very [place], pocket talker ordered. Moves Ax2 for repo/mech lift for transfer to chair. Denies pain and SOB. LS diminished. Moderate BLE lymphedema. Incontinent of bowel and bladder -- purewick in place, adequate UO dark jose and odorous. Loose BM x3 overnight. Regular diet -- needs assistance w/ feeding, poor PO intake. L PIV SL. Blanchable redness on coccyx believed to be moisture related per WOC -- barrier cream applied. Repositioned q2h. Mag redraw this AM     Plan: Discharge back to senior living once medically stable and off supplemental O2

## 2020-11-14 NOTE — PROGRESS NOTES
Patient slept on and off today, had huge soft stool, incontinent of stool and urine.  NP aware of potassium of 3.3,  [place], other vitals are within normal range.  Has  BLE edema, swallows her meds with no problem, needs assist with meals and ordering. Being weaned off oxygen sating in the 90's on one liter, infrequent coughing, non productive, will continue to monitor.

## 2020-11-14 NOTE — PROGRESS NOTES
[facility]     Medicine Progress Note - Hospitalist Service, Gold 4       Date of Admission:  11/12/2020  Assessment & Plan         Ankita Pham is a 89 year old female with past medical history significant for IBS, chronic lymphedema bilateral lower extremities, atrial fib s/p PPM, severe HFrEF (15%), epidural hematoma (2016), GERD, hypothyroidism, and depression admitted from her SNF for COVID 19 infection, dyspnea, and hypoxia.      # Acute hypoxic respiratory failure   # COVID 19   Found to be COVID+ at her nursing home 3 days ago.  Developed new supplemental O2 requirements and transferred to the hospital.  CXR on 11/12 w/ bilateral interstitial opacities likely 2/2 infection vs edema and moderate left pleural effusion. On 2L this AM, feels comfortable.  Drops to high 80s with no O2, question if component of underlying undiagnosed BRITTA.    - Wean O2 as tolerated  - Continue Dexamethasone 6mg x 10 days   - Not a candidate for monoclonal AB therapy  - Albuterol PRN   - Daily COVID labs     # Leukopenia, lymphopenia - WBC 2.8, lymphocytes 0.7.  Likely 2/2 COVID infection.  CRP 30 (26).  D-dimer 1.1 (1.0).   - Trend in AM   - If improving, can likely discharge back to her facility       # HFrEF (15%)  # Moderate L sided pleural effusion   # HTN   # Atrial fib s/p PPM  - Continue PTA Lisinopril, Metoprolol w/ hold parameters   - Continue Lasix 40mg daily  - Continue ASA 325mg daily   - Daily weights   - Trend lytes as below   - Careful w/ fluid resuscitation    # Hypokalemia - Improving s/p replacement.  K 3.3 today.  Mag 1.8.      - Electrolyte replacement protocols ordered   - K may normalize w/ PO intake   - Daily Mag, Phos, BMP to trend lytes     # Chronic bilateral lower extremity edema - Stable.  Not mobile at her living facility.      # GERD - Continue PTA PPI daily     # Hypothyroidism - Continue PTA Levothyroxine 75mcg    # Depression - Continue PTA Celexa 10mg         Diet: Combination Diet Regular Diet Adult    DVT Prophylaxis: Heparin subQ   Pen[place] Catheter: not present  Code Status: No CPR- Do NOT Intubate           Disposition Plan   Expected discharge: 1-2 days, recommended to prior living arrangement once stable O2 requirements and inflammatory markers normalizing..  Entered: RONEN Jain CNP 11/14/2020, 10:37 AM       The patient's care was discussed with the Attending Physician, Dr. Onur Bowen.    RONEN Jain CNP  Hospitalist Service, [address]   Contact information available via [facility] Paging/Directory  Please see sign in/sign out for up to date coverage information  ______________________________________________________________________    Interval History   Ankita is resting in bed.  She opens her eyes to my voice.  She feels good, denies dyspnea, chest pain, and abdominal pain.  Ate some eggs for breakfast this morning.  Follows commands.      Data reviewed today: I reviewed all medications, new labs and imaging results over the last 24 hours.     Physical Exam   Vital Signs: Temp: 97.7  F (36.5  C) Temp src: Axillary BP: 127/59 Pulse: 93   Resp: 16 SpO2: 95 % O2 Device: Oxymask Oxygen Delivery: 2 LPM  Weight: 221 lbs 0 oz    GENERAL: Drowsy. Well nourished, well developed.  No acute distress.    HEENT: Normocephalic, atraumatic. Anicteric sclera. Mucous membranes moist.   CV: RRR. S1, S2. No murmurs appreciated.   RESPIRATORY: Effort slightly inc on 3L. Lungs diminished at bases, possible due to body habitus.   GI: Abdomen soft but distended, bowel sounds present x all 4 quadrants. No tenderness, rebound, or guarding.   NEUROLOGICAL: No focal deficits. Follows commands.   MUSCULOSKELETAL: No joint swelling or tenderness. Moves all extremities.   EXTREMITIES: No gross deformities. Lymphedema of all extremities, chronic.  Immobile.    SKIN: Grossly warm, dry, and intact. No jaundice. No  soraya.       Data   Recent Labs   Lab 11/14/20  0748 11/13/20  1843 11/13/20  1323 11/13/20  0743 11/12/20  1134 11/12/20  1132   WBC 2.8*  --   --  5.2  --  4.8   HGB 14.1  --   --  14.2 16.7* 15.4   *  --   --  101*  --  100   *  --   --  139*  --  157   INR 0.96  --   --  1.03  --   --      --   --  139 142 141   POTASSIUM 3.3* 3.5 2.7* 2.8* 3.5 3.2*   CHLORIDE 101  --   --  99  --  101   CO2 37*  --   --  35*  --  34*   BUN 19  --   --  13  --  13   CR 0.59  --   --  0.55  --  0.57   ANIONGAP 3  --   --  5  --  5   OMERO 7.8*  --   --  7.8*  --  7.8*   *  --   --  92 118* 116*   ALBUMIN  --   --   --   --   --  3.2*   PROTTOTAL  --   --   --   --   --  6.9   BILITOTAL  --   --   --   --   --  0.4   ALKPHOS  --   --   --   --   --  75   ALT  --   --   --   --   --  17   AST  --   --   --   --   --  21   TROPI  --   --   --   --   --  0.041     No results found for this or any previous visit (from the past 24 hour(s)).  Medications       aspirin  325 mg Oral Daily     citalopram  10 mg Oral Daily     dexamethasone  6 mg Oral Daily     furosemide  10 mg Intravenous Once     furosemide  40 mg Oral Daily     heparin ANTICOAGULANT  5,000 Units Subcutaneous Q12H     hypromellose  1 drop Both Eyes TID     levothyroxine  75 mcg Oral Daily     lisinopril  5 mg Oral BID     magnesium oxide  400 mg Oral TID     [Held by provider] magnesium oxide  400 mg Oral Daily     multivitamin  with lutein  1 capsule Oral Daily     omeprazole  20 mg Oral BID AC     polyethylene glycol  17 g Oral Daily     potassium chloride  20 mEq Oral Once     potassium chloride  40 mEq Oral Once     senna-docusate  2 tablet Oral BID     sodium chloride (PF)  3 mL Intracatheter Q8H

## 2020-11-15 LAB
ANION GAP SERPL CALCULATED.3IONS-SCNC: 7 MMOL/L (ref 3–14)
BASOPHILS # BLD AUTO: 0 10E9/L (ref 0–0.2)
BASOPHILS NFR BLD AUTO: 0.2 %
BUN SERPL-MCNC: 20 MG/DL (ref 7–30)
CALCIUM SERPL-MCNC: 8 MG/DL (ref 8.5–10.1)
CHLORIDE SERPL-SCNC: 99 MMOL/L (ref 94–109)
CO2 SERPL-SCNC: 34 MMOL/L (ref 20–32)
CREAT SERPL-MCNC: 0.53 MG/DL (ref 0.52–1.04)
CRP SERPL-MCNC: 18 MG/L (ref 0–8)
D DIMER PPP FEU-MCNC: 1 UG/ML FEU (ref 0–0.5)
DIFFERENTIAL METHOD BLD: ABNORMAL
EOSINOPHIL # BLD AUTO: 0 10E9/L (ref 0–0.7)
EOSINOPHIL NFR BLD AUTO: 0 %
ERYTHROCYTE [DISTWIDTH] IN BLOOD BY AUTOMATED COUNT: 14.6 % (ref 10–15)
FIBRINOGEN PPP-MCNC: 397 MG/DL (ref 200–420)
GFR SERPL CREATININE-BSD FRML MDRD: 79 ML/MIN/{1.73_M2}
GLUCOSE SERPL-MCNC: 102 MG/DL (ref 70–99)
HCT VFR BLD AUTO: 48.3 % (ref 35–47)
HGB BLD-MCNC: 15.2 G/DL (ref 11.7–15.7)
IMM GRANULOCYTES # BLD: 0 10E9/L (ref 0–0.4)
IMM GRANULOCYTES NFR BLD: 0.5 %
INR PPP: 0.92 (ref 0.86–1.14)
LDH SERPL L TO P-CCNC: 234 U/L (ref 81–234)
LYMPHOCYTES # BLD AUTO: 1 10E9/L (ref 0.8–5.3)
LYMPHOCYTES NFR BLD AUTO: 15.8 %
MCH RBC QN AUTO: 31.9 PG (ref 26.5–33)
MCHC RBC AUTO-ENTMCNC: 31.5 G/DL (ref 31.5–36.5)
MCV RBC AUTO: 102 FL (ref 78–100)
MONOCYTES # BLD AUTO: 0.5 10E9/L (ref 0–1.3)
MONOCYTES NFR BLD AUTO: 8.4 %
NEUTROPHILS # BLD AUTO: 4.6 10E9/L (ref 1.6–8.3)
NEUTROPHILS NFR BLD AUTO: 75.1 %
NRBC # BLD AUTO: 0 10*3/UL
NRBC BLD AUTO-RTO: 0 /100
PLATELET # BLD AUTO: 151 10E9/L (ref 150–450)
POTASSIUM SERPL-SCNC: 3.7 MMOL/L (ref 3.4–5.3)
RBC # BLD AUTO: 4.76 10E12/L (ref 3.8–5.2)
RETICS # AUTO: 50 10E9/L (ref 25–95)
RETICS/RBC NFR AUTO: 1.1 % (ref 0.5–2)
SODIUM SERPL-SCNC: 140 MMOL/L (ref 133–144)
WBC # BLD AUTO: 6.1 10E9/L (ref 4–11)

## 2020-11-15 PROCEDURE — 250N000012 HC RX MED GY IP 250 OP 636 PS 637: Performed by: NURSE PRACTITIONER

## 2020-11-15 PROCEDURE — 85379 FIBRIN DEGRADATION QUANT: CPT | Performed by: INTERNAL MEDICINE

## 2020-11-15 PROCEDURE — 36415 COLL VENOUS BLD VENIPUNCTURE: CPT | Performed by: INTERNAL MEDICINE

## 2020-11-15 PROCEDURE — 99233 SBSQ HOSP IP/OBS HIGH 50: CPT | Performed by: NURSE PRACTITIONER

## 2020-11-15 PROCEDURE — 86140 C-REACTIVE PROTEIN: CPT | Performed by: INTERNAL MEDICINE

## 2020-11-15 PROCEDURE — 120N000002 HC R&B MED SURG/OB UMMC

## 2020-11-15 PROCEDURE — 85045 AUTOMATED RETICULOCYTE COUNT: CPT | Performed by: INTERNAL MEDICINE

## 2020-11-15 PROCEDURE — 85610 PROTHROMBIN TIME: CPT | Performed by: INTERNAL MEDICINE

## 2020-11-15 PROCEDURE — 85384 FIBRINOGEN ACTIVITY: CPT | Performed by: INTERNAL MEDICINE

## 2020-11-15 PROCEDURE — 83615 LACTATE (LD) (LDH) ENZYME: CPT | Performed by: INTERNAL MEDICINE

## 2020-11-15 PROCEDURE — 250N000013 HC RX MED GY IP 250 OP 250 PS 637: Performed by: NURSE PRACTITIONER

## 2020-11-15 PROCEDURE — 250N000011 HC RX IP 250 OP 636: Performed by: PHYSICIAN ASSISTANT

## 2020-11-15 PROCEDURE — 99207 PR CDG-MDM COMPONENT: MEETS MODERATE - UP CODED: CPT | Performed by: NURSE PRACTITIONER

## 2020-11-15 PROCEDURE — 250N000013 HC RX MED GY IP 250 OP 250 PS 637: Performed by: PHYSICIAN ASSISTANT

## 2020-11-15 PROCEDURE — 85049 AUTOMATED PLATELET COUNT: CPT | Performed by: INTERNAL MEDICINE

## 2020-11-15 PROCEDURE — 80048 BASIC METABOLIC PNL TOTAL CA: CPT | Performed by: INTERNAL MEDICINE

## 2020-11-15 PROCEDURE — 85025 COMPLETE CBC W/AUTO DIFF WBC: CPT | Performed by: INTERNAL MEDICINE

## 2020-11-15 RX ADMIN — LEVOTHYROXINE SODIUM 75 MCG: 0.07 TABLET ORAL at 08:08

## 2020-11-15 RX ADMIN — CITALOPRAM HYDROBROMIDE 10 MG: 10 TABLET ORAL at 08:08

## 2020-11-15 RX ADMIN — MAGNESIUM OXIDE 400 MG: 400 TABLET ORAL at 08:07

## 2020-11-15 RX ADMIN — FUROSEMIDE 40 MG: 20 TABLET ORAL at 08:07

## 2020-11-15 RX ADMIN — OMEPRAZOLE 20 MG: 20 CAPSULE, DELAYED RELEASE ORAL at 16:27

## 2020-11-15 RX ADMIN — ASPIRIN 325 MG: 325 TABLET, COATED ORAL at 08:08

## 2020-11-15 RX ADMIN — Medication 1 CAPSULE: at 08:08

## 2020-11-15 RX ADMIN — DEXAMETHASONE 6 MG: 2 TABLET ORAL at 08:07

## 2020-11-15 RX ADMIN — OMEPRAZOLE 20 MG: 20 CAPSULE, DELAYED RELEASE ORAL at 08:07

## 2020-11-15 RX ADMIN — LISINOPRIL 5 MG: 5 TABLET ORAL at 19:54

## 2020-11-15 RX ADMIN — HEPARIN SODIUM 5000 UNITS: 10000 INJECTION, SOLUTION INTRAVENOUS; SUBCUTANEOUS at 19:55

## 2020-11-15 RX ADMIN — HEPARIN SODIUM 5000 UNITS: 10000 INJECTION, SOLUTION INTRAVENOUS; SUBCUTANEOUS at 08:08

## 2020-11-15 RX ADMIN — LISINOPRIL 5 MG: 5 TABLET ORAL at 08:08

## 2020-11-15 RX ADMIN — HYPROMELLOSE 2906 (4000 MPA.S) AND HYPROMELLOSE 2906 (50 MPA.S) 1 DROP: 25; 25 SOLUTION OPHTHALMIC at 08:12

## 2020-11-15 ASSESSMENT — ACTIVITIES OF DAILY LIVING (ADL)
ADLS_ACUITY_SCORE: 33

## 2020-11-15 NOTE — PROGRESS NOTES
[facility] [place]    Internal Medicine Progress Note - Gold Service      Assessment & Plan   Ankita Pham is a 89 year old female with a history of IBS, chronic lymphedema of the bilateral lower extremities, atrial fibrillation s/p PPM, severe HFrEF (15%), epidural hematoma (2016), GERD, hypothyroidism, and depression admitted from her SNF on 11/12/20 for COVID-19 infection, dyspnea and hypoxia.     # Acute hypoxic respiratory failure  # COVID 19 Infection:  Tested positive for COVID-19 at [facility] on ~11/10. Developed hypoxia and new oxygen needs and transferred to hospital. CXR 11/12 with bilateral interstitial opacities likely 2/2 infection vs edema and moderate left pleural effusion. Was requiring up to 4L of oxygen, now stable and improving on 1.5-2L. Drops to 80s with no O2, and does have a documented history of BRITTA which may be contributing (not using CPAP).  D-dimer 1.0. Fibrinogen, LDH  WNL. CRP improving.    - Supplemental O2 for POx >92% on RA - wean as able  - Continue Dexamethasone 6mg daily for 10 days (started 11/13)  - Not a candidate for monoclonal antibody therapy - holding Remdesivir as stable and given EF 15% to minize excess fluid administration but if she were to decompensate would start  - Albuterol prn   - Daily COVID labs   - Heparin subcutaneous for DVT ppx   - If inflammatory markers are stable, tentative plan to discharge back to SNF tomorrow. D/W SW/CC who are aware and SNF ok for patient to return.   - Obtain CXR today    # Leukopenia, resolved  # Lymphopenia:  WBC 2.8 and abs lymphocytes 0.7 on 11/14. Likely 2/2 COVID infection. Now improved to 5.1 and 0.9 respectively.   - Trend CBC with diff in AM    # HFrEF (15%)    # Moderate L sided pleural effusion  # HTN  # History of Atrial fibrillation and sick sinus syndrome and AV block s/p PPM (1999):   On PTA Lasix 40mg daily, Lisinopril 5mg BID, Metoprolol XL 12.5mg once daily w/ hold parameters. Continue ASA  325mg daily. Was given IV Lasix 10mg daily on 11/12. S/p PPM placement in 1999. Was previously on Coumadin but this was discontinued in 2016 due to subdural hematoma. CHADSVASC 45. Last ECHO 2017. Follows with PPM team closely.   - Consult placed for pacemaker interrogation  - Continue Lasix 40mg daily  - Continue Lisinopril 5mg BID  - Continue Metoprolol XL 12.5mg once daily w/ hold parameters  - Continue ASA 325mg daily  - Daily weights, I&O     # History of chronic pulmonary embolism (2017):  Had hx of chronic PE diagnosed in 2017. Imaging in 2019 showed the PE had resolved. Denies any chest pain/pleuritic chest pain without dyspnea. Monitor.      # Hypokalemia  # Hypomagnesemia  # Hypophosphatemia:  Likely in setting of poor oral intake as well as diuretics (lasix). K, Mag and Phos intermittently low this admission and supplemented with electrolyte replacement protocol. Today K 4.1, Mag 2.0, Phos 3.2.   - Trend BMP, Mg, and Phos daily  - Electrolyte replacement protocol    # Sacral wounds due to moisture associated skin damage (MASD):  - WOCN following, wound cares per orders    Chronic stable medical problems:  # GERD: Continue PTA PPI daily  # Hypothyroidism: Continue PTA Levothyroxine 75mcg daily  # Depression: Continue PTA Celexa 10mg daily  # Chronic bilateral lower extremity edema: Stable. Not mobile at her [facility] facility.      Diet: Combination Diet Regular Diet Adult  Fluids: N/A  [place]: not present  DVT Prophylaxis: Heparin subcutaneous   Code Status: No CPR- Do NOT Intubate    Disposition Plan   Expected discharge: Tomorrow; recommended to prior living arrangement once oxygen needs stable, inflammatory makers stable, and pacemaker interrogated.     The patient's care was discussed with the Attending Physician, Dr. Welch, Bedside Nurse, Care Coordinator/ and Patient.    Terese Mcclendon PA-C  Hospitalist Service  [facility]  Pager: [phone]    Interval History  "  Patient reports that she is, \"tired,\" but is ready to go back to her skilled nursing facility. She reports sleeping well. She denies any chest pain, SOB, abdominal pain, N/V. She has had incontinence of urine/stool. Oxygen needs stable.     A 4 point ROS was performed (including CV, Resp, GI, ) and negative unless otherwise noted in HPI.     Physical Exam   /64 (BP Location: Right arm)   Pulse 77   Temp 96.9  F (36.1  C) (Axillary)   Resp 18   Ht 1.651 m (5' 5\")   Wt 100.2 kg (221 lb)   SpO2 95%   BMI 36.78 kg/m       GENERAL: Alert and oriented x 3. NAD. Answering questions appropriately. Pleasant.   HEENT: Anicteric sclera. Mucous membranes moist.   CV: RRR. S1, S2. No murmurs appreciated.   RESPIRATORY: Effort normal on 3L of oxygen via NC. Lungs CTAB with no wheezing, rales, rhonchi.   GI: Abdomen soft and non distended, bowel sounds present. No tenderness, rebound, guarding.   NEUROLOGICAL: No focal deficits. Moves all extremities.    EXTREMITIES: Chronic lower extremity peripheral edema. Intact bilateral pedal pulses.   SKIN: No jaundice. No rashes.     Lines/Tubes/Drains  PIV     Data reviewed today: I reviewed all medications, new labs and imaging results over the last 24 hours. I personally reviewed    CXR  Impression:   1. Mildly improved bilateral interstitial opacities  2. Haziness over the left costophrenic angle likely due to  cardiomegaly versus new pulmonary effusion.  3. Bibasilar opacities present likely representing atelectasis.           "

## 2020-11-15 NOTE — PROGRESS NOTES
Assumed cares [id number]. Pt A&Ox4, lethargic. VSS on 1L supplemental O2. Sleeping in between cares. Purwick in place with small amount of dark jose output. BLE edematous. Pt stated she wasn't hungry for dinner, ordered soup for pt and encouraged pt to eat. Pt ate minimal amount of dinner. Continue to monitor and update MD with changes.

## 2020-11-15 NOTE — PROGRESS NOTES
[facility]     Medicine Progress Note - Hospitalist Service, Gold 4       Date of Admission:  11/12/2020  Assessment & Plan         Ankita Pham is a 89 year old female with past medical history significant for IBS, chronic lymphedema bilateral lower extremities, atrial fib s/p PPM, severe HFrEF (15%), epidural hematoma (2016), GERD, hypothyroidism, and depression admitted from her SNF for COVID 19 infection, dyspnea, and hypoxia.      # Acute hypoxic respiratory failure   # COVID 19   Stable.  Found to be COVID+ at her nursing home on 11/10.  Developed new supplemental O2 requirements and transferred to the hospital.  CXR on 11/12 w/ bilateral interstitial opacities likely 2/2 infection vs edema and moderate left pleural effusion. On 1-2L this AM, feels comfortable.  Drops to high 80s with no O2, question if component of underlying undiagnosed BRITTA.    - Wean O2 as tolerated  - Continue Dexamethasone 6mg x 10 days   - Not a candidate for monoclonal AB therapy  - Albuterol PRN   - Daily COVID labs     # Leukopenia, lymphopenia - Resolved.  WBC, lymphocytes normalizing.  CRP continues to downtrend.  D-dimer stable.   - Check CBC, inflammatory markers while inpatient       # HFrEF (15%)  # Moderate L sided pleural effusion   # HTN   # Atrial fib s/p PPM  Stable.    - Continue PTA Lisinopril, Metoprolol w/ hold parameters   - Continue Lasix 40mg daily  - Continue ASA 325mg daily   - Daily weights   - Trend lytes as below   - Careful w/ fluid resuscitation d/t heart failure    # Hypokalemia - Improving s/p replacement.    - Electrolyte replacement protocols ordered PRN  - Daily Mag, Phos, BMP to trend lytes     # Chronic bilateral lower extremity edema - Stable.  Not mobile at her living facility.      # GERD - Continue PTA PPI daily     # Hypothyroidism - Continue PTA Levothyroxine 75mcg    # Depression - Continue PTA Celexa 10mg        Diet: Combination Diet Regular Diet  Adult    DVT Prophylaxis: Heparin subQ   [place] Catheter: not present  Code Status: No CPR- Do NOT Intubate           Disposition Plan   Expected discharge: 1-2 days, recommended to prior living arrangement once stable O2 requirements and inflammatory markers normalizing.  Entered: RONEN Jain CNP 11/15/2020, 1:35 PM       The patient's care was discussed with the Attending Physician, Dr. Onur Bowen.    RONEN Jain CNP  Hospitalist Service, [address]   Contact information available via [facility] Paging/Directory  Please see sign in/sign out for up to date coverage information  ______________________________________________________________________    Interval History   Ankita is resting in bed.   Follows commands.  Says that she feels tired, but denies feeling short of breath or having chest pain.  Having large BMs, but denies abdominal pain.  Verbalizes that she wants something to drink.       Data reviewed today: I reviewed all medications, new labs and imaging results over the last 24 hours.     Physical Exam   Vital Signs: Temp: 96.9  F (36.1  C) Temp src: Axillary BP: 114/64 Pulse: 77   Resp: 18 SpO2: 95 % O2 Device: Oxymask Oxygen Delivery: 2 LPM  Weight: 221 lbs 0 oz    GENERAL: Drowsy. Well nourished, well developed.  No acute distress.    HEENT: Normocephalic, atraumatic. Anicteric sclera. Mucous membranes moist.   CV: RRR. S1, S2. No murmurs appreciated.   RESPIRATORY: Effort normal on 2L. Lungs diminished at bases, possible due to body habitus.   GI: Abdomen soft but distended, bowel sounds present x all 4 quadrants. No tenderness, rebound, or guarding.   NEUROLOGICAL: No focal deficits. Follows commands.   MUSCULOSKELETAL: No joint swelling or tenderness. Moves all extremities.   EXTREMITIES: No gross deformities. Lymphedema of all extremities, chronic.  Immobile.    SKIN: Grossly warm, dry, and intact. No jaundice. No rashes.        Data   Recent Labs   Lab 11/15/20  0647 11/14/20 2028 11/14/20  0748 11/13/20  0743 11/13/20  0743 11/12/20  1132 11/12/20  1132   WBC 6.1  --  2.8*  --  5.2  --  4.8   HGB 15.2  --  14.1  --  14.2   < > 15.4   *  --  101*  --  101*  --  100     --  136*  --  139*  --  157   INR 0.92  --  0.96  --  1.03  --   --      --  141  --  139   < > 141   POTASSIUM 3.7 3.4 3.3*   < > 2.8*   < > 3.2*   CHLORIDE 99  --  101  --  99  --  101   CO2 34*  --  37*  --  35*  --  34*   BUN 20  --  19  --  13  --  13   CR 0.53  --  0.59  --  0.55  --  0.57   ANIONGAP 7  --  3  --  5  --  5   OMERO 8.0*  --  7.8*  --  7.8*  --  7.8*   *  --  115*  --  92   < > 116*   ALBUMIN  --   --   --   --   --   --  3.2*   PROTTOTAL  --   --   --   --   --   --  6.9   BILITOTAL  --   --   --   --   --   --  0.4   ALKPHOS  --   --   --   --   --   --  75   ALT  --   --   --   --   --   --  17   AST  --   --   --   --   --   --  21   TROPI  --   --   --   --   --   --  0.041    < > = values in this interval not displayed.     No results found for this or any previous visit (from the past 24 hour(s)).  Medications       aspirin  325 mg Oral Daily     citalopram  10 mg Oral Daily     dexamethasone  6 mg Oral Daily     furosemide  10 mg Intravenous Once     furosemide  40 mg Oral Daily     heparin ANTICOAGULANT  5,000 Units Subcutaneous Q12H     hypromellose  1 drop Both Eyes TID     levothyroxine  75 mcg Oral Daily     lisinopril  5 mg Oral BID     [Held by provider] magnesium oxide  400 mg Oral Daily     multivitamin  with lutein  1 capsule Oral Daily     omeprazole  20 mg Oral BID AC     polyethylene glycol  17 g Oral Daily     potassium chloride  20 mEq Oral Once     potassium chloride  40 mEq Oral Once     senna-docusate  2 tablet Oral BID     sodium chloride (PF)  3 mL Intracatheter Q8H

## 2020-11-15 NOTE — PLAN OF CARE
"Blood pressure 122/76, pulse 87, temperature 98.8  F (37.1  C), temperature source Oral, resp. rate 20, height 1.651 m (5' 5\"), weight 100.2 kg (221 lb), SpO2 95 %, not currently breastfeeding.    Status: + Covid 19  Neuro:A/O x 4. Calm/pleasant  Pain:Denied having pain  Respiratory/trach: On 1L oxy mask and sat's 96%. LS diminished. No SOB noted.  Cardiac:NRS  GI/:Incontinent of urine once. Abraham wick  in place. No Stool this shift.  Activity:Up with lift. Turn/reposition Q 2hrs.  Line:PIV SL  Plan:Continue to wean off 02 for placement if possible. Monitor respiratory status closely. Follow POC and update primary team with any concerns or status change.      Problem: Adult Inpatient Plan of Care  Goal: Absence of Hospital-Acquired Illness or Injury  Intervention: Identify and Manage Fall Risk  Recent Flowsheet Documentation  Taken 11/15/2020 0900 by Shanice Valerio RN  Safety Promotion/Fall Prevention: fall prevention program maintained     Problem: Adult Inpatient Plan of Care  Goal: Absence of Hospital-Acquired Illness or Injury  Intervention: Prevent Skin Injury  Recent Flowsheet Documentation  Taken 11/15/2020 0900 by Shanice Valerio, RN  Body Position:    turned    side-lying     "

## 2020-11-15 NOTE — PLAN OF CARE
Time: 5701-9416     Reason for admission: COVID-19 +     Vitals: Afebrile, VSS on 1L oxymask. Attempted to wean off O2, still desatting to 85-88%     Pt is lethargic, easily aroused and oriented x4. Very [place]. Moves Ax2 for repo/mech lift for transfer to chair. Denies pain and SOB. LS diminished. Edematous in all extremities which is chronic. Incontinent of bowel and bladder -- adequate UO dark jose and odorous, loose BM x5 overnight -- MD notified. Regular diet -- needs assistance w/ feeding, poor PO intake. L PIV SL. Blanchable redness on coccyx believed to be moisture related per WOC -- barrier cream applied. Repositioned q2h, switched pt to pulsate mattress. K+ recheck in evening 3.4     Plan: Discharge back to [facility] once medically stable and off supplemental O2

## 2020-11-16 ENCOUNTER — APPOINTMENT (OUTPATIENT)
Dept: GENERAL RADIOLOGY | Facility: CLINIC | Age: 85
DRG: 177 | End: 2020-11-16
Attending: PHYSICIAN ASSISTANT
Payer: COMMERCIAL

## 2020-11-16 ENCOUNTER — ANCILLARY PROCEDURE (OUTPATIENT)
Dept: CARDIOLOGY | Facility: CLINIC | Age: 85
DRG: 177 | End: 2020-11-16
Attending: INTERNAL MEDICINE
Payer: COMMERCIAL

## 2020-11-16 DIAGNOSIS — I49.5 SINOATRIAL NODE DYSFUNCTION (H): ICD-10-CM

## 2020-11-16 DIAGNOSIS — I49.5 SINOATRIAL NODE DYSFUNCTION (H): Primary | ICD-10-CM

## 2020-11-16 LAB
ANION GAP SERPL CALCULATED.3IONS-SCNC: 3 MMOL/L (ref 3–14)
BASOPHILS # BLD AUTO: 0 10E9/L (ref 0–0.2)
BASOPHILS NFR BLD AUTO: 0.2 %
BUN SERPL-MCNC: 19 MG/DL (ref 7–30)
CALCIUM SERPL-MCNC: 8.4 MG/DL (ref 8.5–10.1)
CHLORIDE SERPL-SCNC: 96 MMOL/L (ref 94–109)
CO2 SERPL-SCNC: 38 MMOL/L (ref 20–32)
CREAT SERPL-MCNC: 0.46 MG/DL (ref 0.52–1.04)
CRP SERPL-MCNC: 20 MG/L (ref 0–8)
D DIMER PPP FEU-MCNC: 1.3 UG/ML FEU (ref 0–0.5)
DIFFERENTIAL METHOD BLD: ABNORMAL
EOSINOPHIL # BLD AUTO: 0 10E9/L (ref 0–0.7)
EOSINOPHIL NFR BLD AUTO: 0 %
ERYTHROCYTE [DISTWIDTH] IN BLOOD BY AUTOMATED COUNT: 14.7 % (ref 10–15)
FIBRINOGEN PPP-MCNC: 400 MG/DL (ref 200–420)
FOLATE SERPL-MCNC: 13.7 NG/ML
GFR SERPL CREATININE-BSD FRML MDRD: 82 ML/MIN/{1.73_M2}
GLUCOSE SERPL-MCNC: 111 MG/DL (ref 70–99)
HCT VFR BLD AUTO: 49.1 % (ref 35–47)
HGB BLD-MCNC: 15.2 G/DL (ref 11.7–15.7)
IMM GRANULOCYTES # BLD: 0.1 10E9/L (ref 0–0.4)
IMM GRANULOCYTES NFR BLD: 1.5 %
INR PPP: 0.87 (ref 0.86–1.14)
LDH SERPL L TO P-CCNC: 241 U/L (ref 81–234)
LYMPHOCYTES # BLD AUTO: 0.9 10E9/L (ref 0.8–5.3)
LYMPHOCYTES NFR BLD AUTO: 15.4 %
MAGNESIUM SERPL-MCNC: 2 MG/DL (ref 1.6–2.3)
MCH RBC QN AUTO: 31.5 PG (ref 26.5–33)
MCHC RBC AUTO-ENTMCNC: 31 G/DL (ref 31.5–36.5)
MCV RBC AUTO: 102 FL (ref 78–100)
MONOCYTES # BLD AUTO: 0.5 10E9/L (ref 0–1.3)
MONOCYTES NFR BLD AUTO: 9.2 %
NEUTROPHILS # BLD AUTO: 4.3 10E9/L (ref 1.6–8.3)
NEUTROPHILS NFR BLD AUTO: 73.7 %
NRBC # BLD AUTO: 0 10*3/UL
NRBC BLD AUTO-RTO: 0 /100
PLATELET # BLD AUTO: 150 10E9/L (ref 150–450)
POTASSIUM SERPL-SCNC: 4.1 MMOL/L (ref 3.4–5.3)
RBC # BLD AUTO: 4.82 10E12/L (ref 3.8–5.2)
RETICS # AUTO: 52.5 10E9/L (ref 25–95)
RETICS/RBC NFR AUTO: 1.1 % (ref 0.5–2)
SODIUM SERPL-SCNC: 137 MMOL/L (ref 133–144)
VIT B12 SERPL-MCNC: 197 PG/ML (ref 193–986)
WBC # BLD AUTO: 5.9 10E9/L (ref 4–11)

## 2020-11-16 PROCEDURE — 93296 REM INTERROG EVL PM/IDS: CPT

## 2020-11-16 PROCEDURE — 83735 ASSAY OF MAGNESIUM: CPT | Performed by: INTERNAL MEDICINE

## 2020-11-16 PROCEDURE — 71045 X-RAY EXAM CHEST 1 VIEW: CPT | Mod: 26 | Performed by: RADIOLOGY

## 2020-11-16 PROCEDURE — 93294 REM INTERROG EVL PM/LDLS PM: CPT | Performed by: INTERNAL MEDICINE

## 2020-11-16 PROCEDURE — 85379 FIBRIN DEGRADATION QUANT: CPT | Performed by: INTERNAL MEDICINE

## 2020-11-16 PROCEDURE — 85045 AUTOMATED RETICULOCYTE COUNT: CPT | Performed by: INTERNAL MEDICINE

## 2020-11-16 PROCEDURE — 250N000011 HC RX IP 250 OP 636: Performed by: PHYSICIAN ASSISTANT

## 2020-11-16 PROCEDURE — 80048 BASIC METABOLIC PNL TOTAL CA: CPT | Performed by: INTERNAL MEDICINE

## 2020-11-16 PROCEDURE — 120N000002 HC R&B MED SURG/OB UMMC

## 2020-11-16 PROCEDURE — 250N000013 HC RX MED GY IP 250 OP 250 PS 637: Performed by: PHYSICIAN ASSISTANT

## 2020-11-16 PROCEDURE — 82746 ASSAY OF FOLIC ACID SERUM: CPT | Performed by: INTERNAL MEDICINE

## 2020-11-16 PROCEDURE — 82607 VITAMIN B-12: CPT | Performed by: INTERNAL MEDICINE

## 2020-11-16 PROCEDURE — 250N000012 HC RX MED GY IP 250 OP 636 PS 637: Performed by: NURSE PRACTITIONER

## 2020-11-16 PROCEDURE — 83615 LACTATE (LD) (LDH) ENZYME: CPT | Performed by: INTERNAL MEDICINE

## 2020-11-16 PROCEDURE — 85610 PROTHROMBIN TIME: CPT | Performed by: INTERNAL MEDICINE

## 2020-11-16 PROCEDURE — 99233 SBSQ HOSP IP/OBS HIGH 50: CPT | Performed by: INTERNAL MEDICINE

## 2020-11-16 PROCEDURE — 999N000147 HC STATISTIC PT IP EVAL DEFER

## 2020-11-16 PROCEDURE — 71045 X-RAY EXAM CHEST 1 VIEW: CPT

## 2020-11-16 PROCEDURE — 85384 FIBRINOGEN ACTIVITY: CPT | Performed by: INTERNAL MEDICINE

## 2020-11-16 PROCEDURE — 36415 COLL VENOUS BLD VENIPUNCTURE: CPT | Performed by: INTERNAL MEDICINE

## 2020-11-16 PROCEDURE — 86140 C-REACTIVE PROTEIN: CPT | Performed by: INTERNAL MEDICINE

## 2020-11-16 PROCEDURE — 85025 COMPLETE CBC W/AUTO DIFF WBC: CPT | Performed by: INTERNAL MEDICINE

## 2020-11-16 RX ADMIN — HEPARIN SODIUM 5000 UNITS: 10000 INJECTION, SOLUTION INTRAVENOUS; SUBCUTANEOUS at 08:06

## 2020-11-16 RX ADMIN — OMEPRAZOLE 20 MG: 20 CAPSULE, DELAYED RELEASE ORAL at 15:37

## 2020-11-16 RX ADMIN — HYPROMELLOSE 2906 (4000 MPA.S) AND HYPROMELLOSE 2906 (50 MPA.S) 1 DROP: 25; 25 SOLUTION OPHTHALMIC at 19:58

## 2020-11-16 RX ADMIN — LEVOTHYROXINE SODIUM 75 MCG: 0.07 TABLET ORAL at 08:10

## 2020-11-16 RX ADMIN — OMEPRAZOLE 20 MG: 20 CAPSULE, DELAYED RELEASE ORAL at 08:09

## 2020-11-16 RX ADMIN — CITALOPRAM HYDROBROMIDE 10 MG: 10 TABLET ORAL at 08:10

## 2020-11-16 RX ADMIN — FUROSEMIDE 40 MG: 20 TABLET ORAL at 08:10

## 2020-11-16 RX ADMIN — LISINOPRIL 5 MG: 5 TABLET ORAL at 19:57

## 2020-11-16 RX ADMIN — HEPARIN SODIUM 5000 UNITS: 10000 INJECTION, SOLUTION INTRAVENOUS; SUBCUTANEOUS at 19:57

## 2020-11-16 RX ADMIN — DEXAMETHASONE 6 MG: 2 TABLET ORAL at 08:09

## 2020-11-16 RX ADMIN — Medication 1 CAPSULE: at 08:08

## 2020-11-16 RX ADMIN — ASPIRIN 325 MG: 325 TABLET, COATED ORAL at 08:08

## 2020-11-16 RX ADMIN — LISINOPRIL 5 MG: 5 TABLET ORAL at 08:08

## 2020-11-16 ASSESSMENT — ACTIVITIES OF DAILY LIVING (ADL)
ADLS_ACUITY_SCORE: 33

## 2020-11-16 NOTE — PLAN OF CARE
PT 5A: cancel/defer    Per chart review pt arrives from LTC, non-ambulatory at baseline and lift dependent. Pt with a bed hold and planned return to LTC in the next 1-2 days. PT to complete orders and sign off.

## 2020-11-16 NOTE — PROGRESS NOTES
Physician Attestation   I, Denver Welch, saw and evaluated Ankita Pham as part of a shared visit.  I have reviewed and discussed with the advanced practice provider their history, physical and plan.    I personally reviewed the vital signs, medications, labs and imaging.    My key history or physical exam findings:   Patient mentioned about feeling tired; but no change in her breathing; denies any new symptoms except for loss of appetite and extreme tiredness   Still on 1-3 l oxygen     Physical exam:  General: patient with no apparent distress  Chest: bilateral equal breath sounds with no added sounds   CVS: s1s2 with no murmur   Abdomen: soft, non tender, bowel sounds noted   Neuro: AAOx3, b/l UE and LE-5/5    Labs, meds and vitals reviewed     Key management decisions made by me:   -PT/OT consulted for dispo decision   -would plan to keep her next 1-2 days to assess symptomatic improvement and to make sure inflammatory markers are not getting worse       Denver Welch  Date of Service (when I saw the patient): 11/16/20

## 2020-11-16 NOTE — PLAN OF CARE
Time: 6314-0324     Reason for admission: COVID-19 +     Vitals: Afebrile, VSS on 1L oxymask. Attempted to wean off O2, still desatting to 85-88%     Pt is lethargic, easily aroused and oriented x4. Very [place]. Moves Ax2 for repo/mech lift for transfer. Denies pain and SOB. LS diminished. Edematous in all extremities which is chronic. Incontinent of bowel and bladder -- adequate UO cloudy dark jose and odorous, no BM this shift. Regular diet -- needs assistance w/ feeding, poor PO intake. L PIV SL. Blanchable redness on coccyx believed to be moisture related per WOC -- barrier cream applied. Repositioned q2h.     Plan: Discharge back to [facility] once medically stable and off supplemental O2

## 2020-11-16 NOTE — PLAN OF CARE
OT 5A: deferral, per chart review and discussion with interdisciplinary team, pt dependent with ADLs at baseline and receives 24/7 care at LTC for self cares and mobility. No acute OT needs identified, recommend discharge back to prior living facility once medically stable, will complete orders.

## 2020-11-16 NOTE — PLAN OF CARE
VSS on 1 liter mask or 3 liters nasal canula as pt is mouth breather, afebrile, reposition q 2 hours, no BM, purewick in place draining jose colored urine, alert, very [place], denies pain, fair po intake

## 2020-11-17 ENCOUNTER — APPOINTMENT (OUTPATIENT)
Dept: CT IMAGING | Facility: CLINIC | Age: 85
DRG: 177 | End: 2020-11-17
Attending: PHYSICIAN ASSISTANT
Payer: COMMERCIAL

## 2020-11-17 LAB
ANION GAP SERPL CALCULATED.3IONS-SCNC: 4 MMOL/L (ref 3–14)
BASOPHILS # BLD AUTO: 0 10E9/L (ref 0–0.2)
BASOPHILS NFR BLD AUTO: 0.2 %
BUN SERPL-MCNC: 23 MG/DL (ref 7–30)
C DIFF TOX B STL QL: NEGATIVE
CALCIUM SERPL-MCNC: 8.1 MG/DL (ref 8.5–10.1)
CHLORIDE SERPL-SCNC: 98 MMOL/L (ref 94–109)
CO2 SERPL-SCNC: 34 MMOL/L (ref 20–32)
CREAT SERPL-MCNC: 0.5 MG/DL (ref 0.52–1.04)
CRP SERPL-MCNC: 15 MG/L (ref 0–8)
D DIMER PPP FEU-MCNC: 1.5 UG/ML FEU (ref 0–0.5)
DIFFERENTIAL METHOD BLD: ABNORMAL
EOSINOPHIL # BLD AUTO: 0 10E9/L (ref 0–0.7)
EOSINOPHIL NFR BLD AUTO: 0.3 %
ERYTHROCYTE [DISTWIDTH] IN BLOOD BY AUTOMATED COUNT: 14.6 % (ref 10–15)
FIBRINOGEN PPP-MCNC: 348 MG/DL (ref 200–420)
GFR SERPL CREATININE-BSD FRML MDRD: 80 ML/MIN/{1.73_M2}
GLUCOSE SERPL-MCNC: 87 MG/DL (ref 70–99)
HCT VFR BLD AUTO: 45.9 % (ref 35–47)
HGB BLD-MCNC: 14.6 G/DL (ref 11.7–15.7)
IMM GRANULOCYTES # BLD: 0.1 10E9/L (ref 0–0.4)
IMM GRANULOCYTES NFR BLD: 1.6 %
INR PPP: 0.96 (ref 0.86–1.14)
LYMPHOCYTES # BLD AUTO: 0.9 10E9/L (ref 0.8–5.3)
LYMPHOCYTES NFR BLD AUTO: 14.7 %
MAGNESIUM SERPL-MCNC: 2 MG/DL (ref 1.6–2.3)
MCH RBC QN AUTO: 31.3 PG (ref 26.5–33)
MCHC RBC AUTO-ENTMCNC: 31.8 G/DL (ref 31.5–36.5)
MCV RBC AUTO: 99 FL (ref 78–100)
MONOCYTES # BLD AUTO: 0.6 10E9/L (ref 0–1.3)
MONOCYTES NFR BLD AUTO: 10.5 %
NEUTROPHILS # BLD AUTO: 4.4 10E9/L (ref 1.6–8.3)
NEUTROPHILS NFR BLD AUTO: 72.7 %
NRBC # BLD AUTO: 0 10*3/UL
NRBC BLD AUTO-RTO: 0 /100
PLATELET # BLD AUTO: 110 10E9/L (ref 150–450)
POTASSIUM SERPL-SCNC: 4.1 MMOL/L (ref 3.4–5.3)
RBC # BLD AUTO: 4.66 10E12/L (ref 3.8–5.2)
SODIUM SERPL-SCNC: 136 MMOL/L (ref 133–144)
SPECIMEN SOURCE: NORMAL
WBC # BLD AUTO: 6.1 10E9/L (ref 4–11)

## 2020-11-17 PROCEDURE — 250N000013 HC RX MED GY IP 250 OP 250 PS 637: Performed by: PHYSICIAN ASSISTANT

## 2020-11-17 PROCEDURE — 74177 CT ABD & PELVIS W/CONTRAST: CPT | Mod: 26 | Performed by: RADIOLOGY

## 2020-11-17 PROCEDURE — 85384 FIBRINOGEN ACTIVITY: CPT | Performed by: PHYSICIAN ASSISTANT

## 2020-11-17 PROCEDURE — 80048 BASIC METABOLIC PNL TOTAL CA: CPT | Performed by: PHYSICIAN ASSISTANT

## 2020-11-17 PROCEDURE — 85379 FIBRIN DEGRADATION QUANT: CPT | Performed by: PHYSICIAN ASSISTANT

## 2020-11-17 PROCEDURE — 36415 COLL VENOUS BLD VENIPUNCTURE: CPT | Performed by: PHYSICIAN ASSISTANT

## 2020-11-17 PROCEDURE — 84132 ASSAY OF SERUM POTASSIUM: CPT | Performed by: PHYSICIAN ASSISTANT

## 2020-11-17 PROCEDURE — 99233 SBSQ HOSP IP/OBS HIGH 50: CPT | Performed by: INTERNAL MEDICINE

## 2020-11-17 PROCEDURE — 74177 CT ABD & PELVIS W/CONTRAST: CPT

## 2020-11-17 PROCEDURE — 85025 COMPLETE CBC W/AUTO DIFF WBC: CPT | Performed by: PHYSICIAN ASSISTANT

## 2020-11-17 PROCEDURE — 250N000011 HC RX IP 250 OP 636: Performed by: INTERNAL MEDICINE

## 2020-11-17 PROCEDURE — 250N000011 HC RX IP 250 OP 636: Performed by: PHYSICIAN ASSISTANT

## 2020-11-17 PROCEDURE — 250N000012 HC RX MED GY IP 250 OP 636 PS 637: Performed by: NURSE PRACTITIONER

## 2020-11-17 PROCEDURE — 87493 C DIFF AMPLIFIED PROBE: CPT | Performed by: PHYSICIAN ASSISTANT

## 2020-11-17 PROCEDURE — 999N000127 HC STATISTIC PERIPHERAL IV START W US GUIDANCE

## 2020-11-17 PROCEDURE — 83735 ASSAY OF MAGNESIUM: CPT | Performed by: PHYSICIAN ASSISTANT

## 2020-11-17 PROCEDURE — 85610 PROTHROMBIN TIME: CPT | Performed by: PHYSICIAN ASSISTANT

## 2020-11-17 PROCEDURE — 120N000002 HC R&B MED SURG/OB UMMC

## 2020-11-17 PROCEDURE — 86140 C-REACTIVE PROTEIN: CPT | Performed by: PHYSICIAN ASSISTANT

## 2020-11-17 RX ORDER — FUROSEMIDE 10 MG/ML
20 INJECTION INTRAMUSCULAR; INTRAVENOUS ONCE
Status: COMPLETED | OUTPATIENT
Start: 2020-11-17 | End: 2020-11-17

## 2020-11-17 RX ORDER — IOPAMIDOL 755 MG/ML
135 INJECTION, SOLUTION INTRAVASCULAR ONCE
Status: COMPLETED | OUTPATIENT
Start: 2020-11-17 | End: 2020-11-17

## 2020-11-17 RX ADMIN — LISINOPRIL 5 MG: 5 TABLET ORAL at 21:10

## 2020-11-17 RX ADMIN — IOPAMIDOL 135 ML: 755 INJECTION, SOLUTION INTRAVENOUS at 13:58

## 2020-11-17 RX ADMIN — DEXAMETHASONE 6 MG: 2 TABLET ORAL at 07:49

## 2020-11-17 RX ADMIN — FUROSEMIDE 20 MG: 10 INJECTION, SOLUTION INTRAVENOUS at 13:14

## 2020-11-17 RX ADMIN — LEVOTHYROXINE SODIUM 75 MCG: 0.07 TABLET ORAL at 07:50

## 2020-11-17 RX ADMIN — ASPIRIN 325 MG: 325 TABLET, COATED ORAL at 07:49

## 2020-11-17 RX ADMIN — HEPARIN SODIUM 5000 UNITS: 10000 INJECTION, SOLUTION INTRAVENOUS; SUBCUTANEOUS at 07:47

## 2020-11-17 RX ADMIN — OMEPRAZOLE 20 MG: 20 CAPSULE, DELAYED RELEASE ORAL at 07:49

## 2020-11-17 RX ADMIN — LISINOPRIL 5 MG: 5 TABLET ORAL at 07:49

## 2020-11-17 RX ADMIN — FUROSEMIDE 40 MG: 20 TABLET ORAL at 07:49

## 2020-11-17 RX ADMIN — CITALOPRAM HYDROBROMIDE 10 MG: 10 TABLET ORAL at 07:50

## 2020-11-17 RX ADMIN — Medication 1 CAPSULE: at 07:46

## 2020-11-17 ASSESSMENT — ACTIVITIES OF DAILY LIVING (ADL)
ADLS_ACUITY_SCORE: 31
ADLS_ACUITY_SCORE: 31
ADLS_ACUITY_SCORE: 35
ADLS_ACUITY_SCORE: 31

## 2020-11-17 NOTE — PROGRESS NOTES
Physician Attestation   I, Denver Welch, saw and evaluated Ankita Pham as part of a shared visit.  I have reviewed and discussed with the advanced practice provider their history, physical and plan.    I personally reviewed the vital signs, medications, labs and imaging.    My key history or physical exam findings:   Patient was seen in bedside; pt moaning this morning and appeared uncomfortable, did not mention any specific symptoms,abdomen appeared more distended , having bowel movements     O/E:  General: patient is no apparent distress   Chest: bilateral equal breath sounds with no added sounds   CVS: s1s2 with no murmur   Abdomen; distended, bowel sounds noted   Neuro: AAOx1, bilateral UE and LE-4/5    Key management decisions made by me:   - will obtain CT abdomen given bowel distension;  cdiff PCR pending   -continue bowel regimen; resolving from COVID-19 perspective       Denver Welch  Date of Service (when I saw the patient): 11/17/20

## 2020-11-17 NOTE — PLAN OF CARE
Time:  8473-5761    Reason for admission: COVID +, SOB, Hypoxia  Activity:  Lift dependent, can turn well, A2  Pain:  No c/o pain this shift  Neuro:  AOx4, pleasant and calm. Did not use call light all shift as pt slept most of the time  Cardiac: WDL, pacemaker in place  Respiratory:  Stable on 1L Oxymask, No SOB reported. Mild, infrequent, weak, nonproductive cough noted.   GI/:  Purewick in place, skin w/ mild redness. 4 BMs this shift, watery and tan. Incontinent.   Diet:  Reg diet, tolerating well  Lines:  L PIV removed d/t occulusion/pain with flushing.   Skin:  Healed old PI on sacrum, Open to air. Blanchable redness and mild bruising throughout. Skin remains intact. Turned and repositioned q2h. Severe/moderate edema in hips and BLE, Left w/ more edema than Right.   Labs/Imaging:  No new labs/imaging this shift.     New changes this shift:  VSS on 1L Oxymask. Pt slept well most of this shift.     Plan:  Bed hold at LTC facility, Will plan to keep her next 1-2 days to assess symptomatic improvement and to make sure inflammatory markers are not getting worse

## 2020-11-17 NOTE — PROGRESS NOTES
Care Management Discharge Note    Discharge Date: 11/17/20  Expected Time of Departure: 4pm via [facility] (ph [phone])  PCS form completed and placed in pt's chart d/t COVID dx, need for O2.    Discharge Disposition: Long Term Care   [facility]  [address] [place] [address]   [phone]  Fax [phone]    RN please call report to [phone]    Discharge Services: (N/A)    Discharge DME: Oxygen    Discharge Transportation: agency- [facility]    Private pay costs discussed: Not applicable    PAS Confirmation Code:  N/A, return to facility  Patient/family educated on Medicare website which has current facility and service quality ratings: N/A, return to facility    Education Provided on the Discharge Plan:  yes  Persons Notified of Discharge Plans: Primary team Gold 4, admissions at [facility] ( [phone]), RN Parisa, pt (RN notified- pt [place] and does answer room phone)  Patient/Family in Agreement with the Plan: yes    Handoff Referral Completed: No- no PCP on file    Additional Information:  Pt medically stable for d/c today per team. Discharge arranged for 4pm. Facility aware pt is COVID+.    ALEX Richter, [facility]    [facility]  Pager [phone]  Phone [phone]    Addendum 12:02pm: Notified that pt not stable for d/c today. Left  for [facility]. Cancelled ride. Potential d/c tomorrow.

## 2020-11-17 NOTE — PLAN OF CARE
Time: 0700-1930    Reason for admit:   Vitals:  VSS on 3 liters nasal canula or 1 liter via oximask as pt is mouthbreather  Activity: bedrest, turn q 2 hours  Neuro: alert, difficult to assess chin to very [place], orientated to situation  Mood/Behavior: flat affect, sleeps between cares  Lines/Drains: PIV  Cardiac: pacer  Resp: 3 liters nasal canula or 1 liter via oximask as pt is mouth breather, cough today, infrequent nonproductive, this is new today  Diet: Regular, fair po intake with encouragement  GI/: incontinent loose stools, sent for cdiff, incontinent cloudy jose colored urine, purewick prn  Skin: WDL  Pain: denies  Labs/Imaging:    New this shift: abd CT, cdiff sent, pt reports not feeling well, cant explain how, discharge on hold    Plan: possible discharge tomorrow pending clinical stability

## 2020-11-17 NOTE — PROGRESS NOTES
[facility], [place]    Internal Medicine Progress Note - Gold Service      Assessment & Plan   Ankita Pham is a 89 year old female with a history of IBS, chronic lymphedema of the bilateral lower extremities, atrial fibrillation s/p PPM, severe HFrEF (15%), epidural hematoma (2016), GERD, hypothyroidism, and depression admitted from her SNF on 11/12/20 for COVID-19 infection, dyspnea and hypoxia.     # Acute hypoxic respiratory failure  # COVID 19 Infection:  Tested positive for COVID-19 at SNF on ~11/10. Developed hypoxia and new oxygen needs and transferred to hospital. CXR 11/12 with bilateral interstitial opacities likely 2/2 infection vs edema and moderate left pleural effusion. Was requiring up to 4L of oxygen, now stable and improving on 1.5-2L. Drops to 80s with no O2, and does have a documented history of BRITTA which may be contributing (not using CPAP). CXR 11/16 with mildly improved b/l interstitial opacities and haziness over the left costophrenic angle likely due to cardiomegaly vs new pulmonary effusion and b/l opacities likely representing atelectasis.  D-dimer 1.0. Fibrinogen, LDH  WNL. CRP improving.  Oxygen needs stable at 1-3L (3L with NC given mouth breather).   - Supplemental O2 for POx >92% on RA - wean as able  - Continue Dexamethasone 6mg daily for 10 days (started 11/13)  - Continue PTA Lasix 4 0mg daily and give IV Lasix 20mg once today   - Not a candidate for monoclonal antibody therapy - holding Remdesivir as stable and given EF 15% to minize excess fluid administration but if she were to decompensate would start  - Albuterol prn   - Daily COVID labs   - Heparin subcutaneous for DVT ppx  - Will likely require oxygen at discharge   - Son was updated    # Leukopenia, resolved  # Lymphopenia, resolved:  WBC 2.8 and abs lymphocytes 0.7 on 11/14. Likely 2/2 COVID infection. Now resolved.   - Trend CBC with diff in AM    # Abdominal distension  # Loose  "stools:  Patient noted to be moaning and more uncomfortably appearing today. Afebrile, VSS and labs stable. Is having ~4 loose bowel movements daily. Has distended abdomen, but appears chronic. No pain with palpation. Diarrhea could be attributed to COVID-19, however, given loose stools, and distended abdomen - CT scan of abd/pelvis ordered and C. Diff. CT with marked gaseous dilation of the sigmoid colon, not significantly changed since 2019. Hepatic steatosis. And choledocholithiasis with dilatation and wall thickening of CBD, similar to prior. No e/o cholecystitis. C diff negative.  Discussed with son who states that she has \"off and on days,\" and that the moaning is not all uncommon - but appreciated work-up.  - Monitor     # HFrEF (15%)    # Moderate L sided pleural effusion  # Right atrial enlargement on CT scan   # HTN  # History of Atrial fibrillation and sick sinus syndrome and AV block s/p PPM (1999):    On PTA Lasix 40mg daily, Lisinopril 5mg BID, Metoprolol XL 12.5mg once daily w/ hold parameters. Continue ASA 325mg daily. Was given IV Lasix 10mg daily on 11/12. S/p PPM placement in 1999. Was previously on Coumadin but this was discontinued in 2016 due to subdural hematoma. CHADSVASC 45. Last ECHO 2017. Follows with PPM team closely.   - Consult placed for pacemaker interrogation completed 11/16 - device functioning within normal device parameters.   - Continue Lasix 40mg daily - additional IV Lasix 20mg given today   - Continue Lisinopril 5mg BID  - Continue Metoprolol XL 12.5mg once daily w/ hold parameters  - Continue ASA 325mg daily  - Daily weights, I&O     # History of chronic pulmonary embolism (2017):  Had hx of chronic PE diagnosed in 2017. Imaging in 2019 showed the PE had resolved. Denies any chest pain/pleuritic chest pain without dyspnea.   - Monitor     # Hypokalemia  # Hypomagnesemia  # Hypophosphatemia:  Likely in setting of poor oral intake as well as diuretics (lasix). K, Mag and Phos " "intermittently low this admission and supplemented with electrolyte replacement protocol. K/Mg/Phos WNL.  - Trend BMP, Mg, and Phos daily  - Electrolyte replacement protocol    # Sacral wounds due to moisture associated skin damage (MASD):  - WOCN following, wound cares per orders    Chronic stable medical problems:  # GERD: Continue PTA PPI daily  # Hypothyroidism: Continue PTA Levothyroxine 75mcg daily  # Depression: Continue PTA Celexa 10mg daily  # Chronic bilateral lower extremity edema: Stable. Not mobile at her [facility] facility.      Diet: Combination Diet Regular Diet Adult  Fluids: N/A  [place]: not present  DVT Prophylaxis: Heparin subcutaneous   Code Status: No CPR- Do NOT Intubate    Disposition Plan   Expected discharge: Tomorrow; recommended to prior living arrangement once oxygen needs stable, inflammatory makers stable, and safe disposition plan in place.     The patient's care was discussed with the Attending Physician, Dr. Welch, Bedside Nurse, Care Coordinator/ and Patient.    Terese Mcclendon PA-C  Hospitalist Service  [facility]  Pager: [phone]    Interval History   Patient appears uncomfortable and is moaning. She denies chest pain, worsening SOB or abdominal pain, N/V. She is having 4 loose bowel movements daily. Incontinent of urine. She reports she slept.     A 4 point ROS was performed (including CV, Resp, GI, ) and negative unless otherwise noted in HPI.     Physical Exam   /45   Pulse 95   Temp 96  F (35.6  C)   Resp 18   Ht 1.651 m (5' 5\")   Wt 100.2 kg (221 lb)   SpO2 96%   BMI 36.78 kg/m       GENERAL: Alert and oriented x 3. NAD. Moaning. Hard of hearing.  HEENT: Anicteric sclera. Mucous membranes moist.   CV: RRR. S1, S2. No murmurs appreciated.   RESPIRATORY: Effort normal on 3L of oxygen via NC. Lungs CTAB with no wheezing, rales, rhonchi.   GI: Abdomen soft and moderately distended, bowel sounds present. No tenderness, rebound, " guarding.   NEUROLOGICAL: No focal deficits. Moves all extremities.    EXTREMITIES: Chronic lower extremity peripheral edema. Intact bilateral pedal pulses.   SKIN: No jaundice. No rashes.     Lines/Tubes/Drains  PIV     Data reviewed today: I reviewed all medications, new labs and imaging results over the last 24 hours. I personally reviewed    CT abd/pelvis with contrast  Impression:   1. Marked gaseous dilation of the sigmoid colon, not significantly  changed since 2019.  2. Choledocholithiasis with dilation and wall thickening of the common  bile duct, similar to prior. No evidence of acute cholecystitis.  3. Hepatic steatosis.  4. Trace bilateral pleural effusions with overlying atelectasis and  groundglass attenuation, possibly aspiration/infectious.  5. Moderate hiatal hernia.  6. Right atrial enlargement.

## 2020-11-18 ENCOUNTER — APPOINTMENT (OUTPATIENT)
Dept: SPEECH THERAPY | Facility: CLINIC | Age: 85
DRG: 177 | End: 2020-11-18
Attending: PHYSICIAN ASSISTANT
Payer: COMMERCIAL

## 2020-11-18 VITALS
HEART RATE: 80 BPM | SYSTOLIC BLOOD PRESSURE: 145 MMHG | WEIGHT: 221 LBS | BODY MASS INDEX: 36.82 KG/M2 | OXYGEN SATURATION: 96 % | TEMPERATURE: 95.8 F | HEIGHT: 65 IN | RESPIRATION RATE: 20 BRPM | DIASTOLIC BLOOD PRESSURE: 70 MMHG

## 2020-11-18 LAB
ALBUMIN UR-MCNC: NEGATIVE MG/DL
ANION GAP SERPL CALCULATED.3IONS-SCNC: 5 MMOL/L (ref 3–14)
APPEARANCE UR: ABNORMAL
BACTERIA #/AREA URNS HPF: ABNORMAL /HPF
BASOPHILS # BLD AUTO: 0 10E9/L (ref 0–0.2)
BASOPHILS NFR BLD AUTO: 0.1 %
BILIRUB UR QL STRIP: NEGATIVE
BUN SERPL-MCNC: 23 MG/DL (ref 7–30)
CALCIUM SERPL-MCNC: 8.6 MG/DL (ref 8.5–10.1)
CHLORIDE SERPL-SCNC: 97 MMOL/L (ref 94–109)
CO2 SERPL-SCNC: 36 MMOL/L (ref 20–32)
COLOR UR AUTO: YELLOW
CREAT SERPL-MCNC: 0.68 MG/DL (ref 0.52–1.04)
CRP SERPL-MCNC: 15 MG/L (ref 0–8)
D DIMER PPP FEU-MCNC: 2.9 UG/ML FEU (ref 0–0.5)
DIFFERENTIAL METHOD BLD: ABNORMAL
EOSINOPHIL # BLD AUTO: 0 10E9/L (ref 0–0.7)
EOSINOPHIL NFR BLD AUTO: 0 %
ERYTHROCYTE [DISTWIDTH] IN BLOOD BY AUTOMATED COUNT: 14.6 % (ref 10–15)
FIBRINOGEN PPP-MCNC: 380 MG/DL (ref 200–420)
GFR SERPL CREATININE-BSD FRML MDRD: 72 ML/MIN/{1.73_M2}
GLUCOSE SERPL-MCNC: 99 MG/DL (ref 70–99)
GLUCOSE UR STRIP-MCNC: NEGATIVE MG/DL
HCT VFR BLD AUTO: 48.2 % (ref 35–47)
HGB BLD-MCNC: 14.8 G/DL (ref 11.7–15.7)
HGB UR QL STRIP: NEGATIVE
IMM GRANULOCYTES # BLD: 0.1 10E9/L (ref 0–0.4)
IMM GRANULOCYTES NFR BLD: 0.7 %
INR PPP: 0.98 (ref 0.86–1.14)
KETONES UR STRIP-MCNC: NEGATIVE MG/DL
LDH SERPL L TO P-CCNC: 246 U/L (ref 81–234)
LEUKOCYTE ESTERASE UR QL STRIP: ABNORMAL
LYMPHOCYTES # BLD AUTO: 0.8 10E9/L (ref 0.8–5.3)
LYMPHOCYTES NFR BLD AUTO: 12 %
MCH RBC QN AUTO: 31.5 PG (ref 26.5–33)
MCHC RBC AUTO-ENTMCNC: 30.7 G/DL (ref 31.5–36.5)
MCV RBC AUTO: 103 FL (ref 78–100)
MONOCYTES # BLD AUTO: 0.6 10E9/L (ref 0–1.3)
MONOCYTES NFR BLD AUTO: 9.1 %
NEUTROPHILS # BLD AUTO: 5.4 10E9/L (ref 1.6–8.3)
NEUTROPHILS NFR BLD AUTO: 78.1 %
NITRATE UR QL: POSITIVE
NRBC # BLD AUTO: 0 10*3/UL
NRBC BLD AUTO-RTO: 0 /100
PH UR STRIP: 7.5 PH (ref 5–7)
PLATELET # BLD AUTO: 143 10E9/L (ref 150–450)
POTASSIUM SERPL-SCNC: 3.8 MMOL/L (ref 3.4–5.3)
RBC # BLD AUTO: 4.7 10E12/L (ref 3.8–5.2)
RBC #/AREA URNS AUTO: 2 /HPF (ref 0–2)
SODIUM SERPL-SCNC: 138 MMOL/L (ref 133–144)
SOURCE: ABNORMAL
SP GR UR STRIP: 1.01 (ref 1–1.03)
UROBILINOGEN UR STRIP-MCNC: NORMAL MG/DL (ref 0–2)
WBC # BLD AUTO: 6.9 10E9/L (ref 4–11)
WBC #/AREA URNS AUTO: 6 /HPF (ref 0–5)

## 2020-11-18 PROCEDURE — 81001 URINALYSIS AUTO W/SCOPE: CPT | Performed by: INTERNAL MEDICINE

## 2020-11-18 PROCEDURE — 85379 FIBRIN DEGRADATION QUANT: CPT | Performed by: PHYSICIAN ASSISTANT

## 2020-11-18 PROCEDURE — 250N000013 HC RX MED GY IP 250 OP 250 PS 637: Performed by: PHYSICIAN ASSISTANT

## 2020-11-18 PROCEDURE — 86140 C-REACTIVE PROTEIN: CPT | Performed by: PHYSICIAN ASSISTANT

## 2020-11-18 PROCEDURE — 85025 COMPLETE CBC W/AUTO DIFF WBC: CPT | Performed by: PHYSICIAN ASSISTANT

## 2020-11-18 PROCEDURE — 99207 PR APP CREDIT; MD BILLING SHARED VISIT: CPT | Performed by: PHYSICIAN ASSISTANT

## 2020-11-18 PROCEDURE — 87088 URINE BACTERIA CULTURE: CPT | Performed by: INTERNAL MEDICINE

## 2020-11-18 PROCEDURE — 99239 HOSP IP/OBS DSCHRG MGMT >30: CPT | Performed by: INTERNAL MEDICINE

## 2020-11-18 PROCEDURE — 87086 URINE CULTURE/COLONY COUNT: CPT | Performed by: INTERNAL MEDICINE

## 2020-11-18 PROCEDURE — 85610 PROTHROMBIN TIME: CPT | Performed by: PHYSICIAN ASSISTANT

## 2020-11-18 PROCEDURE — 92610 EVALUATE SWALLOWING FUNCTION: CPT | Mod: GN | Performed by: SPEECH-LANGUAGE PATHOLOGIST

## 2020-11-18 PROCEDURE — 250N000011 HC RX IP 250 OP 636: Performed by: PHYSICIAN ASSISTANT

## 2020-11-18 PROCEDURE — 250N000012 HC RX MED GY IP 250 OP 636 PS 637: Performed by: NURSE PRACTITIONER

## 2020-11-18 PROCEDURE — 36415 COLL VENOUS BLD VENIPUNCTURE: CPT | Performed by: PHYSICIAN ASSISTANT

## 2020-11-18 PROCEDURE — 80048 BASIC METABOLIC PNL TOTAL CA: CPT | Performed by: PHYSICIAN ASSISTANT

## 2020-11-18 PROCEDURE — 85384 FIBRINOGEN ACTIVITY: CPT | Performed by: PHYSICIAN ASSISTANT

## 2020-11-18 PROCEDURE — 87186 SC STD MICRODIL/AGAR DIL: CPT | Performed by: INTERNAL MEDICINE

## 2020-11-18 PROCEDURE — 83615 LACTATE (LD) (LDH) ENZYME: CPT | Performed by: PHYSICIAN ASSISTANT

## 2020-11-18 RX ORDER — LORAZEPAM 1 MG/1
0.5 TABLET ORAL DAILY PRN
Qty: 30 TABLET | Refills: 0 | Status: SHIPPED | OUTPATIENT
Start: 2020-11-18

## 2020-11-18 RX ORDER — OXYCODONE HYDROCHLORIDE 5 MG/1
5 TABLET ORAL EVERY 8 HOURS PRN
Qty: 40 TABLET | Refills: 0 | Status: SHIPPED | OUTPATIENT
Start: 2020-11-18

## 2020-11-18 RX ORDER — CEPHALEXIN 500 MG/1
500 CAPSULE ORAL 2 TIMES DAILY
Qty: 13 CAPSULE | Refills: 0 | DISCHARGE
Start: 2020-11-18

## 2020-11-18 RX ORDER — DEXAMETHASONE 6 MG/1
6 TABLET ORAL DAILY
Qty: 4 TABLET | Refills: 0 | DISCHARGE
Start: 2020-11-19

## 2020-11-18 RX ORDER — CEPHALEXIN 500 MG/1
500 CAPSULE ORAL 2 TIMES DAILY
Qty: 13 CAPSULE | Refills: 0 | Status: SHIPPED | OUTPATIENT
Start: 2020-11-18 | End: 2020-11-18

## 2020-11-18 RX ADMIN — LISINOPRIL 5 MG: 5 TABLET ORAL at 08:00

## 2020-11-18 RX ADMIN — DEXAMETHASONE 6 MG: 2 TABLET ORAL at 08:00

## 2020-11-18 RX ADMIN — OMEPRAZOLE 20 MG: 20 CAPSULE, DELAYED RELEASE ORAL at 07:58

## 2020-11-18 RX ADMIN — CEPHALEXIN 500 MG: 250 CAPSULE ORAL at 16:02

## 2020-11-18 RX ADMIN — FUROSEMIDE 40 MG: 20 TABLET ORAL at 07:59

## 2020-11-18 RX ADMIN — HEPARIN SODIUM 5000 UNITS: 10000 INJECTION, SOLUTION INTRAVENOUS; SUBCUTANEOUS at 08:04

## 2020-11-18 RX ADMIN — ASPIRIN 325 MG: 325 TABLET, COATED ORAL at 07:59

## 2020-11-18 RX ADMIN — Medication 1 CAPSULE: at 07:59

## 2020-11-18 RX ADMIN — CITALOPRAM HYDROBROMIDE 10 MG: 10 TABLET ORAL at 07:59

## 2020-11-18 RX ADMIN — LEVOTHYROXINE SODIUM 75 MCG: 0.07 TABLET ORAL at 07:58

## 2020-11-18 ASSESSMENT — ACTIVITIES OF DAILY LIVING (ADL)
ADLS_ACUITY_SCORE: 35
ADLS_ACUITY_SCORE: 35
ADLS_ACUITY_SCORE: 33
ADLS_ACUITY_SCORE: 35
ADLS_ACUITY_SCORE: 33

## 2020-11-18 NOTE — DISCHARGE SUMMARY
[facility]    Internal Medicine Discharge Summary- Gold Service    Date of Admission: 11/12/2020  Date of Discharge: 11/18/2020  Discharging Provider: Terese Mcclendon PA-C/Denver Welch MD  Discharging Team: Gold 4    Follow-ups Needed After Discharge   1. Follow up with your nursing home provider and primary care provider within 3 days for hospital follow-up. Recommend CBC, CMP and Magnesium level at that time. Follow up urine culture results and sensitivities to ensure proper antibiotic coverage. Follow up to wean oxygen as able.  2. Follow up with your Cardiologist for follow up of your heart disease and pace maker    Discharge Diagnoses   Acute hypoxic respiratory failure secondary to COVID-19 infection  History of BRITTA (not on CPAP)  Leukopenia and lymphopenia, resolved  Abdominal distention  Loose stools  Concern for UTI  HFrEF (EF 15%)  Moderate left sided pleural effusion  HTN  History of atrial fibrillation and sick sinus syndrome and AV block s/p PPM (1999)  History of chronic pulmonary embolism, resolved  Hypokalemia, hypomagnesemia, hypophosphatemia  Sacral wounds due to moisture associated skin damage  GERD  Hypothyroidism  Depression  Chronic bilateral lower extremity edema      Hospital Course     Ankita Pham is a 89 year old female with a history of IBS, chronic lymphedema of the bilateral lower extremities, atrial fibrillation s/p PPM, severe HFrEF (15%), epidural hematoma (2016), GERD, hypothyroidism, and depression admitted from her SNF on 11/12/20 for COVID-19 infection, dyspnea and hypoxia.     The following problems were addressed during his hospitalization:    # Acute hypoxic respiratory failure secondary to COVID-19 infection  # History of BRITTA (not on CPAP):  Tested positive for COVID-19 at SNF on ~11/10. Developed hypoxia and new oxygen needs and transferred to hospital. CXR 11/12 with bilateral interstitial opacities likely 2/2 infection vs edema  and moderate left pleural effusion. Was requiring up to 4L of oxygen, and has been stable on 1-3L since 1/14 (1L with oxymask, and 3L with nasal cannula given mouth breather). Was started on dexamethasone 6mg daily for plan to complete for 10 days. Was not treated with Remdesivir given stability and to minimize excess fluid administration given her EF 15%. She was not a candidate for monoclonal antibody therapy. Does have a history of BRITTA which may be contributing (not using CPAP). CXR repeated on 11/16 with mildly improved b/l interstitial opacities and haziness over the left costophrenic angle likely due to cardiomegaly vs new pulmonary effusion and b/l opacities likely representing atelectasis. Was continued on her home diuretics and given additional Lasix 20mg IV once on 11/17 (along with 10mg IV on 11/12). Her inflammatory markers have improved and remain stable: CRP 30>15, D-dimer 1-2, Fibrinogen WNL. Upon day of discharge, patient stable on 1-3L as above. She was A&Ox3, appeared well and was ready for discharge back to nursing home facility - which is ok with her returning with COVID + status. She requires new home oxygen therapy, which should be monitored by the SNF provider and weaned as able. She was on heparin for DVT ppx - will transition to apixiban on discharge (prophylaxis dosing) for 30 days.  - Follow up with provider at nursing home and primary care provider for hospital follow-up. Recommend repeat CBC and CMP and Mag level.   - Continue supplemental oxygen for POx >92% (requires 1L with oxymask and 3L with NC) - wean as able with SNF providers   - Continue dexamethasone 6mg daily for 4 more doses (next dose 11/19) for 10 day treatment   - Discharged on Apixiban 2.5mg BID for 30 days for DVT prophylaxis given + COVID status and increased risk of thromboembolic events. Monitor for bleeding.   - Continue PTA Lasix 40mg daily  - Son was updated on day of discharge   - SLP assessed patient today and no  sign of aspiration    I certify that this patient, Ankita Pham has been under my care (or a nurse practitioner or physican's assistant working with me). This is the face-to-face encounter for oxygen medical necessity.    Ankita Pham is now in a chronic stable state and continues to require supplemental oxygen. Patient has continued oxygen desaturation due to COVID-19 infection.  Alternative treatment(s) tried or considered and deemed clinically infective for treatment of COVID-19 infection include inhalers, steroids and pulmonary toileting.  If portability is ordered, is the patient mobile within the home? no  **Patients who qualify for home O2 coverage under the CMS guidelines require ABG tests or O2 sat readings obtained closest to, but no earlier than 2 days prior to the discharge, as evidence of the need for home oxygen therapy. Testing must be performed while patient is in the chronic stable state. See notes for O2 sats.**    # Leukopenia, resolved  # Lymphopenia, resolved:  WBC 2.8 and abs lymphocytes 0.7 on 11/14. Likely 2/2 COVID infection. Now resolved.   - Follow up with nursing home provider and monitor CBC with diff     # Abdominal distension  # Loose stools:   Patient noted to be moaning and more uncomfortably appearing 11/17. Afebrile, VSS and labs stable.  Having ~4 loose bowel movements daily. Has distended abdomen, but appears chronic. No pain with palpation. C diff ordered and negative 11/17. Diarrhea could be attributed to COVID-19, and also on stool softeners. CT scan of abd/pelvis ordered and C. Diff. CT with marked gaseous dilation of the sigmoid colon, not significantly changed since 2019. Hepatic steatosis, nd choledocholithiasis with dilatation and wall thickening of CBD, similar to prior. No e/o cholecystitis.  Today, patient is much improved- abdomen is soft and non tender. No acute concerns.   - Monitor     # Concern for UTI:  UA obtained today (catheterized urine) with positive  nitrite, small leukocyte esterase, and 6 WBC. Urine culture pending. No urine cultures in care everywhere for comparison. Afebrile without flank pain. Will start Keflex 500mg BID for 7 days for treatment of UTI. Given 1st dose in the hospital prior to discharge. Will need to follow up with nursing home provider to monitor UCx results to ensure adequate antibiotic coverage.  - Keflex 500mg BID x7 days  - Urine culture pending - nursing home provider to follow up to ensure adequate antibiotic coverage      # HFrEF (EF 15%)    # Moderate L sided pleural effusion  # HTN  # History of Atrial fibrillation and sick sinus syndrome and AV block s/p PPM (1999):    On PTA Lasix 40mg daily, Lisinopril 5mg BID, Metoprolol XL 12.5mg once daily w/ hold parameters. Continue ASA 325mg daily. Was given IV Lasix 10mg daily on 11/12. S/p PPM placement in 1999. Was previously on Coumadin but this was discontinued in 2016 due to subdural hematoma. CHADSVASC 45. Last ECHO 2017. Follows with PPM team closely. Pacemaker interrogation completed 11/16 - device functioning within normal device parameters. Was continued on home medications and was given IV Lasix 20mg once on 11/17. Appears euvolemic on exam.  - Follow up with your Cardiologist for follow up of your heart disease and pack maker   - Follow up with nursing home provider upon discharge for follow up and monitoring   - Continue Lasix 40mg daily    - Continue Lisinopril 5mg BID  - Continue Metoprolol XL 12.5mg once daily w/ hold parameters  - Continue ASA 325mg daily  - Daily weights, I&O      # History of chronic pulmonary embolism (2017):  Had hx of chronic PE diagnosed in 2017. Imaging in 2019 showed the PE had resolved. Denies any chest pain/pleuritic chest pain without dyspnea.   - Monitor     # Hypokalemia  # Hypomagnesemia  # Hypophosphatemia:  Likely in setting of poor oral intake as well as diuretics (lasix). K, Mag and Phos intermittently low this admission and supplemented  "with electrolyte replacement protocol. K 3.8 today, Mag 2.0 on 11/17 and phos WNL.   - Continue PTA K-dur supplementation on discharge  - Follow up with nursing home provider and recommend repeat CBC, CMP and Mag level within 3 days     # Sacral wounds due to moisture associated skin damage (MASD):  - WOCN following while in hospital   - Continue wound cares at nursing facility    Cleanse the area with NS and pat dry.    Apply No sting film barrier to periwound skin.    Apply [employer] paste to deficit and open wounds    Cover wound with Mepilex, fold in half when applying to make sure deficit comes in contact with Mepilex. Change dressing Q 3 days.    Pressure injury prevention    # GERD: Continue PTA PPI daily    # Hypothyroidism: Continue PTA Levothyroxine 75mcg daily    # Depression: Continue PTA Celexa 10mg daily    # Chronic bilateral lower extremity edema: Stable. Not mobile at her [facility] facility.       Consultations This Hospital Stay   WOUND OSTOMY CONTINENCE NURSE  IP CONSULT  CARE MANAGEMENT / SOCIAL WORK IP CONSULT  PHYSICAL THERAPY ADULT IP CONSULT  OCCUPATIONAL THERAPY ADULT IP CONSULT  VASCULAR ACCESS CARE ADULT IP CONSULT  SPEECH LANGUAGE PATH ADULT IP CONSULT  PHYSICAL THERAPY ADULT IP CONSULT  OCCUPATIONAL THERAPY ADULT IP CONSULT    Physical Exam   Blood pressure (!) 145/70, pulse 80, temperature 95.8  F (35.4  C), temperature source Axillary, resp. rate 20, height 1.651 m (5' 5\"), weight 100.2 kg (221 lb), SpO2 96 %, not currently breastfeeding.  GENERAL: Alert and oriented x 3. Sitting up in bed resting comfortably. Answering questions appropriately. Pleasant.   HEENT: Anicteric sclera. Mucous membranes moist.   CV: RRR. S1, S2. No murmurs appreciated.   RESPIRATORY: Effort normal on room air. Lungs CTAB with no wheezing, rales, rhonchi.   GI: Abdomen soft and moderately distended and stable since admission, bowel sounds present. No tenderness, rebound, guarding.   NEUROLOGICAL: No focal deficits. " Moves all extremities.   EXTREMITIES: Chronic lower extremity peripheral edema. Venous stasis changes. Intact bilateral pedal pulses. No tenderness to calves.   SKIN: No jaundice. No rashes.      Significant Results and Procedures   Most Recent 3 CBC's:  Recent Labs   Lab Test 11/18/20  0736 11/17/20  0745 11/16/20  0733   WBC 6.9 6.1 5.9   HGB 14.8 14.6 15.2   * 99 102*   * 110* 150     Most Recent 3 BMP's:  Recent Labs   Lab Test 11/18/20  0736 11/17/20  0745 11/16/20  0733    136 137   POTASSIUM 3.8 4.1 4.1   CHLORIDE 97 98 96   CO2 36* 34* 38*   BUN 23 23 19   CR 0.68 0.50* 0.46*   ANIONGAP 5 4 3   OMERO 8.6 8.1* 8.4*   GLC 99 87 111*     Most Recent 2 LFT's:  Recent Labs   Lab Test 11/12/20  1132 04/18/19  0112   AST 21 14   ALT 17 14   ALKPHOS 75 69   BILITOTAL 0.4 0.5     Most Recent INR's and Anticoagulation Dosing History:  Anticoagulation Dose History     Recent Dosing and Labs Latest Ref Rng & Units 3/4/2017 11/13/2020 11/14/2020 11/15/2020 11/16/2020 11/17/2020 11/18/2020    INR 0.86 - 1.14 1.07 1.03 0.96 0.92 0.87 0.96 0.98        Most Recent 3 Troponin's:  Recent Labs   Lab Test 11/12/20  1132 03/03/17  2231 03/03/17  2124   TROPI 0.041 0.940* 0.929*     Most Recent Cholesterol Panel:  Recent Labs   Lab Test 06/26/13  1302   CHOL 133   LDL 77   HDL 29*   TRIG 138     Most Recent 6 Bacteria Isolates From Any Culture (See EPIC Reports for Culture Details):  Recent Labs   Lab Test 11/18/20  1315 03/03/17  1328 03/03/17  1103 07/14/16  0521 07/14/16  0428 08/29/14  0047   CULT PENDING 50,000 to 100,000 colonies/mL Escherichia coli* No growth  No growth No MRSA isolated >100,000 colonies/mL Escherichia coli  >100,000 colonies/mL Strain 2 Escherichia coli  <10,000 colonies/mL Strain 3 Escherichia coli  * 50,000 to 100,000 colonies/mL Escherichia coli*     Most Recent TSH and T4:  Recent Labs   Lab Test 03/03/17  1103 07/14/16  1219   TSH 0.83 1.98   T4  --  1.15     Most Recent  Urinalysis:  Recent Labs   Lab Test 11/18/20  1315   COLOR Yellow   APPEARANCE Slightly Cloudy   URINEGLC Negative   URINEBILI Negative   URINEKETONE Negative   SG 1.014   UBLD Negative   URINEPH 7.5*   PROTEIN Negative   NITRITE Positive*   LEUKEST Small*   RBCU 2   WBCU 6*   ,   Results for orders placed or performed during the hospital encounter of 11/12/20   XR Chest Port 1 View    Narrative    EXAM: XR CHEST PORT 1 VW  11/12/2020 12:54 PM     HISTORY:  covid +, eval for worsening lung fields       COMPARISON:  4/18/2019    FINDINGS: Single view of the chest. Midline trachea. Moderate left  pleural effusion. Bilateral interstitial opacities. Cardiac pacer  device. No pneumothorax. Degenerative changes of the glenohumeral  joints.       Impression    IMPRESSION:   1. Bilateral interstitial opacities, likely infection although a  component of edema is not excluded.  2. Moderate left pleural effusion.    I have personally reviewed the examination and initial interpretation  and I agree with the findings.    INDER BELLAMY MD   POC US ECHO LIMITED    Impression    Bedside, focused cardiac ultrasound performed and interpreted by me.    Indication: weakness    Parasternal long, parasternal short, apical four-chamber views were acquired for gross evaluation of global cardiac function, chamber size and pericardial effusion.  Image quality was fair. Global left ventricular function appears severely decreased. LA is large . .  There is no evidence of free fluid within the pericardium.     Impression: Bedside limited cardiac ultrasound showing decreased  left ventricular function and enrrique LA size.  There is no pericardial effusion.      POC US CHEST B-SCAN    Impression    Limited chest ultrasound for evaluation of COVID findings performed and interpreted by me  Indication: weakness, eval for covid19  Findings: Predominantly A- line pattern.  Scattered b-lines and subpleural effusions and a skip lesion type pattern.       Impression: Scattered B-lines and subpleural effusions consistent with viral pneumonia such as Covid19     XR Chest Port 1 View    Narrative    Exam: XR CHEST PORT 1 VW, 11/16/2020 1:01 PM    Indication: follow up- edema and COVID -19 infection    Comparison: Chest x-ray dated on 12/20/2020, CT chest abdomen pelvis  dated 4/18/2019    Findings:   Patient is mildly rotated to the right. Airway is midline. 2-lead  pacemaker present in the left chest. Cardiomegaly.. Bilateral apices  are clear. There are patchy interstitial opacities bilaterally which  appear mildly improved compared to previous study.Bibasilar opacities  present likely representing atelectasis. Haziness over the left  costophrenic angle could represent pulmonary effusion, however could  also be obscured by the cardiac silhouette. Visualized abdomen is  within normal limits. Moderate degenerative changes of the left  glenohumeral joint. No acute osseous abnormality.      Impression    Impression:   1. Mildly improved bilateral interstitial opacities  2. Haziness over the left costophrenic angle likely due to  cardiomegaly versus new pulmonary effusion.  3. Bibasilar opacities present likely representing atelectasis.     I have personally reviewed the examination and initial interpretation  and I agree with the findings.    INDER BELLAMY MD   CT Abdomen Pelvis w Contrast    Narrative    Examination:  CT ABDOMEN PELVIS W CONTRAST 11/17/2020 2:22 PM     History: Abdominal distention    Comparison: CT chest abdomen and pelvis 4/18/2019.    Technique: CT of the abdomen and pelvis were obtained with contrast.  Sagittal and coronal reconstructions created and reviewed. Contrast:  iopamidol (ISOVUE-370) solution 135 mL    Findings:     Abdomen and pelvis: The hepatic parenchyma is diffusely  hypoattenuating relative to the spleen. No suspicious hepatic lesions  identified. The gallbladder is nearly decompressed with multiple round  intraluminal  radiodensities. No significant wall thickening or  pericholecystic fluid. Mild to moderate intrahepatic biliary  dilatation. The common bile duct is dilated measuring up to 17 mm  containing multiple intraluminal radiodensities. Subcentimeter  hypoattenuating lesion in the splenic (series 5 image 165), likely  cyst. The adrenal glands and pancreas are unremarkable. Asymmetric  opacification of the renal parenchyma. Bilateral simple renal cysts  measuring up to 3.3 and 3.5 cm on the right and left, respectively.  Urinary bladder is unremarkable.    Moderate hiatal hernia. The small bowel is decompressed. Colonic  diverticulosis throughout the colon. Marked gaseous distention of the  sigmoid colon measuring up to 8.5 cm in diameter. No pneumatosis. No  abnormal uterine masses.    No suspicious abdominal or pelvic lymphadenopathy. No free fluid. No  pneumoperitoneum.    Abdominal aorta is normal in caliber and patent. Dense atherosclerotic  calcifications at the celiac and SMA origin. Splenic artery  calcifications. Portal veins and IVC are patent    Lower thorax: Trace bilateral pleural effusions with overlying  compressive atelectasis and groundglass attenuation, right greater  than left. Cardiomegaly with right atrial enlargement. Cardiac  pacemaker wires terminating in the right atrium and ventricle.    Bones and soft tissues: Diffuse osteopenia. Multilevel degenerative  changes in the thoracic lumbar spine with rightward scoliotic  curvature centered at L2. Soft tissue is unremarkable. Diffuse  muscular atrophy.      Impression    Impression:   1. Marked gaseous dilation of the sigmoid colon, not significantly  changed since 2019.  2. Choledocholithiasis with dilation and wall thickening of the common  bile duct, similar to prior. No evidence of acute cholecystitis.  3. Hepatic steatosis.  4. Trace bilateral pleural effusions with overlying atelectasis and  groundglass attenuation, possibly  aspiration/infectious.  5. Moderate hiatal hernia.  6. Right atrial enlargement.    I have personally reviewed the examination and initial interpretation  and I agree with the findings.    PHOENIX PIERCE MD       Pending Results   These results will be followed up by the nursing home provider  Unresulted Labs Ordered in the Past 30 Days of this Admission     Date and Time Order Name Status Description    11/18/2020 1315 Urine Culture Aerobic Bacterial Preliminary           Primary Care Physician   No primary care provider on file.    Discharge Disposition   Discharged to nursing home  Condition at discharge: Stable    Code Status   No CPR- Do NOT Intubate    Discharge Orders   Discharge Procedure Orders   General info for SNF   Order Comments: Length of Stay Estimate: Long Term Care  Condition at Discharge: Stable  Level of care:skilled   Rehabilitation Potential: Fair  Admission H&P remains valid and up-to-date: Yes  Recent Chemotherapy: N/A  Use Nursing Home Standing Orders: Yes     Mantoux instructions   Order Comments: Give two-step Mantoux (PPD) Per Facility Policy Yes     Reason for your hospital stay   Order Comments: Dear Ankita Pham    Your were hospitalized at [facility] with COVID-19 infection and treated with steroids and oxygen. You were also found to have a possible UTI.  Over your hospitalization your symptoms improved and today you are ready to be discharged back to your nursing home.  If you continue the following therapy you should continue to improve but if you develop fever, shortness of breath, light headedness, chest pain or worsening symptoms please seek medical attention.    We are suggesting the following medication changes:  1. Take dexamethasone 6mg daily for 4 more doses (next dose 11/19)  2. Take apixiban 2.5mg twice daily for DVT prophylaxis for 30 days  3. Take cephalexin 500mg twice daily for 7 days for presumed UTI (next dose this  evening)        Please set up an appointment with:  Follow up with your nursing home provider and primary care provider within 3 days for hospital follow-up. Recommend CBC, CMP and Magnesium level at that time. Follow up urine culture results and sensitivities to ensure proper antibiotic coverage. Follow up to wean oxygen as able.      It was a pleasure meeting with you today. Thank you for allowing me and my team the privilege of caring for you today. You are the reason we are here, and I truly hope we provided you with the excellent service you deserve. Please let us know if there is anything else we can do for you so that we can be sure you are leaving completely satisfied with your care experience.    Your hospital unit at the time of discharge is 5A so if you have any questions please call the hospital at [phone] and ask to talk to a nurse on 5A.    Take Care!     Terese Mcclendon PA-C  Hospitalist Service  Pager [phone]     Glucose monitor nursing POCT   Order Comments: Before meals and at bedtime     Intake and output   Order Comments: Every shift     Daily weights   Order Comments: Call Provider for weight gain of more than 2 pounds per day or 5 pounds per week.     Activity - Up with nursing assistance   Order Comments: Does not ambulate     Order Specific Question Answer Comments   Is discharge order? Yes      Follow Up and recommended labs and tests   Order Comments: Follow up with your nursing home provider and primary care provider within 3 days for hospital follow-up. Recommend CBC, CMP and Magnesium level at that time. Follow up urine culture results and sensitivities to ensure proper antibiotic coverage. Follow up to wean oxygen as able.    Follow up with your Cardiologist for follow up of your heart disease and pace maker     No CPR- Do NOT Intubate     Order Specific Question Answer Comments   Code status determined by: Discussion with patient/ legal decision maker      Physical Therapy  Adult Consult   Order Comments: Evaluate and treat as clinically indicated.    Reason:  deconditioning     Occupational Therapy Adult Consult   Order Comments: Evaluate and treat as clinically indicated.    Reason:  deconditioning     Contact and Droplet Isolation     Fall precautions     Oxygen Adult/Peds     Order Specific Question Answer Comments   DME Provider: [facility]    Type: New/Recertification    Oxygen Consult Reqt: Call [facility] before patient leaves at [phone] (to ensure SATS testing is completed).    Did the patient have SpO2 (sat) testing (only needed for new oxgyen or liter flow changes)? Yes    Length of Need: Other (comment) until covid infection improves and hypoxia resolves   Frequency of Use: Continuous    Mode of Delivery - Continuous Nasal Cannula    Liter Flow - Continuous (LPM): 1-3 liters (1 liter with oxymask and 3 liters with nasal cannula given mouth breather)    Need for Portability: Yes    Evaluate for Conserving Device: Yes    Maintain Sats >= 92%    The face to face evaluation was performed on: 11/18/2020      Advance Diet as Tolerated   Order Comments: Follow this diet upon discharge: Orders Placed This Encounter      Combination Diet Regular Diet Adult     Order Specific Question Answer Comments   Is discharge order? Yes         Discharge Medications   Discharge Medication List as of 11/18/2020  3:28 PM      START taking these medications    Details   apixaban ANTICOAGULANT (ELIQUIS) 2.5 MG tablet Take 1 tablet (2.5 mg) by mouth 2 times daily, Disp-60 tablet, R-1, Transitional      dexamethasone (DECADRON) 6 MG tablet Take 1 tablet (6 mg) by mouth daily, Disp-4 tablet, R-0, Transitional         CONTINUE these medications which have CHANGED    Details   cephALEXin (KEFLEX) 500 MG capsule Take 1 capsule (500 mg) by mouth 2 times daily, Disp-13 capsule, R-0, Transitional      LORazepam (ATIVAN) 1 MG tablet Take 0.5 tablets (0.5 mg) by mouth daily as  needed for anxiety, Disp-30 tablet, R-0, Local Print      oxyCODONE (ROXICODONE) 5 MG tablet Take 1 tablet (5 mg) by mouth every 8 hours as needed for moderate to severe pain, Disp-40 tablet, R-0, Local Print         CONTINUE these medications which have NOT CHANGED    Details   acetaminophen (TYLENOL) 500 MG tablet Take 2 tablets (1,000 mg) by mouth 3 times daily, Disp-60 tablet, R-1, MELISSA, Transitional      albuterol (2.5 MG/3ML) 0.083% nebulizer solution Take 1 vial (2.5 mg) by nebulization every 6 hours as needed for shortness of breath / dyspnea or wheezing, Disp-360 mL, Transitional      ascorbic acid 500 MG TABS Take 1 tablet (500 mg) by mouth daily, Disp-30 tablet, Historical      aspirin  MG tablet Take 325 mg by mouth daily , Disp-40 tablet, Historical      bisacodyl (DULCOLAX) 10 MG suppository Place 1 suppository (10 mg) rectally daily as needed for constipation, Disp-30 suppository, Transitional      Citalopram Hydrobromide (CELEXA PO) Take 10 mg by mouth daily, Historical      furosemide (LASIX) 40 MG tablet Take 1 tablet (40 mg) by mouth daily, Disp-30 tablet, Transitional      Gabapentin (NEURONTIN PO) Take 200 mg by mouth daily, Historical      hypromellose (ARTIFICIAL TEARS) 0.5 % SOLN Place 1 drop into both eyes 3 times daily, Historical      lactulose (CHRONULAC) 10 GM/15ML solution Take 30 mLs by mouth daily as needed for constipation, Historical      levothyroxine (SYNTHROID, LEVOTHROID) 75 MCG tablet Take 1 tablet (75 mcg) by mouth daily, Disp-90 tablet, R-3, Historical      lisinopril (PRINIVIL/ZESTRIL) 5 MG tablet Take 1 tablet (5 mg) by mouth 2 times daily, Disp-30 tablet, Transitional      magnesium oxide (MAG-OX) 400 MG tablet Take 1 tablet (400 mg) by mouth daily, Disp-60 tablet, R-0, Transitional      METOPROLOL SUCCINATE ER PO Take 12.5 mg by mouth At Bedtime, Historical      multivitamin  with lutein (OCUVITE WITH LTEIN) CAPS Take 1 capsule by mouth daily, R-0, Historical       omeprazole 20 MG tablet Take 1 tablet (20 mg) by mouth 2 times daily, Disp-30 tablet, Transitional      Ondansetron HCl (ZOFRAN PO) Take 4 mg by mouth every 6 hours as needed for nausea or vomiting, Historical      polyethylene glycol (MIRALAX/GLYCOLAX) Packet Take 17 g by mouth daily, Disp-7 packet, TransitionalHold for loose stools      potassium chloride SA (K-DUR,KLOR-CON M) 20 MEQ tablet Take 1 tablet (20 mEq) by mouth daily, Disp-60 tablet, Transitional      senna-docusate (SENOKOT-S;PERICOLACE) 8.6-50 MG per tablet Take 2 tablets by mouth 2 times daily, Disp-100 tablet, TransitionalHold for loose stools      SIMETHICONE-80 PO Take 80 mg by mouth 2 times daily as needed for intestinal gas in addition to scheduled doses, Historical             Time Spent on this Encounter   I, Terese Mcclendon PA-C, personally saw the patient today and spent greater than 30 minutes discharging this patient.    Patient was evaluated on day of discharge by attending physician who agrees with plan of care.     Terese Mcclendon PA-C  Hospitalist Service  [facility]  Pager: [phone]

## 2020-11-18 NOTE — PROGRESS NOTES
"   11/18/20 1102   General Information   Onset of Illness/Injury or Date of Surgery 11/12/20   Referring Physician Terese Mcclendon PA-C   Patient/Family Therapy Goal Statement (SLP) Patient wanted to drink juice.   Pertinent History of Current Problem Per provider note: \"Ankita Pham is a 89 year old female with a history of IBS, chronic lymphedema of the bilateral lower extremities, atrial fibrillation s/p PPM, severe HFrEF (15%), epidural hematoma (2016), GERD, hypothyroidism, and depression admitted from her SNF on 11/12/20 for COVID-19 infection, dyspnea and hypoxia.\" RN reported no difficulty when taking pills with orange juice this morning and NA reported no swallowing difficulty when eating breakfast (toast) this morning. CT 11/17 revealed moderate hiatal hernia and Trace bilateral pleural effusions with overlying atelectasis and groundglass attenuation, possibly aspiration/infectious. Patient referred for swallow evaluation to further assess current swallow function, particularly given imaging findings.   General Observations Patient awake and agreeable to limited quantity of oral intake for exam today.    Past History of Dysphagia No hx of dysphagia based on chart review.   Disability/Function   Hearing Difficulty or Deaf yes  ([place]eller)   Hearing Management   (gain attention and speak loudly and clearly)   Pain Assessment   Patient Currently in Pain No   Type of Evaluation   Type of Evaluation Swallow Evaluation   Oral Motor   Oral Musculature generally intact  (based on observation with PO trials and speech)   Dentition (Oral Motor)   Dentition (Oral Motor) significant number of missing teeth;dental appliance/dentures  (dentures at home per patient report)   Cough/Swallow/Gag Reflex (Oral Motor)   Comment, Cough/Swallow/Gag Reflex (Oral Motor) Did not formally assess oral motor or cough/throat clear effort given COVID-19 precautions.   Vocal Quality/Secretion Management (Oral Motor)   Vocal Quality (Oral " Motor) hoarse  (mild)   Secretion Management (Oral Motor) WNL   General Swallowing Observations   Current Diet/Method of Nutritional Intake (General Swallowing Observations, NIS) regular diet;thin liquids   Respiratory Support (General Swallowing Observations) nasal cannula  (3L)   Swallowing Evaluation Clinical swallow evaluation   Clinical Swallow Evaluation   Feeding Assistance minimal assistance required  (set up and assist as needed due to weakness)   Clinical Swallow Evaluation Textures Trialed Thin Liquids;Puree Textures   Clinical Swallow Eval: Thin Liquid Texture Trial   Mode of Presentation, Thin Liquids spoon;cup;straw;fed by clinician;self-fed   Volume of Liquid or Food Presented 4 oz   Oral Phase of Swallow WFL   Pharyngeal Phase of Swallow throat clearing  (1 instance on initial sip; no other overt aspiration signs)   Clinical Swallow Evaluation: Puree Solid Texture Trial   Mode of Presentation, Puree spoon;fed by clinician   Volume of Puree Presented 1 bite applesauce  (patient declined further intake)   Oral Phase, Puree WFL   Pharyngeal Phase, Puree intact   Esophageal Phase of Swallow   Patient reports or presents with symptoms of esophageal dysphagia No   Esophageal comments Hx of GERD on PPI; moderate hiatal hernia on CT 11/17/2020.   Swallowing Recommendations   Diet Consistency Recommendations regular diet;thin liquids  (softer menu items during admisison given missing dentition)   Supervision Level for Intake   (set up assist and assist as needed due to weakness)   Mode of Delivery Recommendations bolus size, small;slow rate of intake   Recommended Feeding/Eating Techniques (Swallow Eval) maintain upright sitting position for eating;maintain upright posture during/after eating for 30 minutes;set-up and prepare tray   Medication Administration Recommendations, Swallowing (SLP) one at a time   SLP Therapy Assessment/Plan   Criteria for Skilled Therapeutic Interventions Met (SLP Eval) no problems  identified which require skilled intervention   SLP Diagnosis Functional swallow for regular diet/thin liquids (soft items during admission given missing dentition)   Therapy Frequency (SLP Eval) evaluation only   Comment, Therapy Assessment/Plan (SLP) Patient presents with functional swallow for regular diet and thin liquids (soft menu items during admission due to missing dentition) based on limited exam today as patient declined further oral intake. Patient demonstrated one instance of throat clearing after initial sip thin liquid, but no other overt aspiration signs occurred with additional oral intake. Note functional oral phase for liquids and puree consistency and no oral residue remained. Patient has upper dentures at home. Recommend continue regular diet with thin liquids (select softer menu items during admission due to missing dentition) and provide set up and meal assist as needed due to weakness. Patient to follow swallow and reflux precautions (upright position during and at least 30 minutes after oral intake, small single sips/bites, slow rate).   SLP Discharge Planning    SLP Discharge Recommendation (DC Rec) home with assist  (due to weakness)   SLP Rationale for DC Rec No further SLP needs identified at this time.   SLP Brief overview of current status  Recommend continue regular diet with thin liquids (select softer menu items during admission due to missing dentition) and provide set up and meal assist as needed due to weakness. Patient to follow swallow and reflux precautions (upright position during and at least 30 minutes after oral intake, small single sips/bites, slow rate).    Total Evaluation Time   Total Evaluation Time (Minutes) 10

## 2020-11-18 NOTE — PLAN OF CARE
Time: 1900-0730     Reason for admission: COVID-19 +     Vitals: Afebrile, VSS on 1L oxymask or 3L NC as pt is a mouth breather     Pt is lethargic, easily aroused and oriented x4. Very [place]. Moves Ax2 for repo/mech lift for transfer. Denies pain and SOB. LS diminished in bases. Edematous in all extremities which is chronic, R more than L. Incontinent of bowel and bladder -- purewick in place w/ adequate UO cloudy dark jose in color and odorous, small loose BM x2 this shift. Regular diet -- needs assistance w/ feeding. R PIV SL. Repositioned q2h.      Plan: Possible discharge today back to [facility]

## 2020-11-18 NOTE — PROGRESS NOTES
Care Management Discharge Note     Discharge Date: 11/18/20  Expected Time of Departure: 4pm via [facility] (ph [phone])  PCS form completed and placed in pt's chart d/t COVID dx, need for O2.     Discharge Disposition: Long Term Care   [facility]  [address] [place] [address]   [phone]  Fax [phone]     RN please call report to [phone]     Discharge Services: (N/A)     Discharge DME: Oxygen     Discharge Transportation: agency- [employer]     Private pay costs discussed: Not applicable     PAS Confirmation Code:  N/A, return to facility  Patient/family educated on Medicare website which has current facility and service quality ratings: N/A, return to facility     Education Provided on the Discharge Plan:  yes  Persons Notified of Discharge Plans: Primary team Gold 4, admissions at [facility] (ph [phone]), RN Shelley, pt (RN notified- pt [facility] and does answer room phone)  Patient/Family in Agreement with the Plan: yes     Handoff Referral Completed: No- no PCP on file     Additional Information:  Pt medically stable for d/c today per team. Discharge arranged for 4pm. Facility aware pt is COVID+.     ALEX Richter, [facility]    [facility]  Pager [phone]  Phone [phone]

## 2020-11-18 NOTE — PLAN OF CARE
Time: 0700 - 1700    Reason for admit: COVID-19.   Vitals: VSS on 3 L NC ex HTN, afebrile.   Activity: Assist of 2/Lift.   Neuro: A&Ox4, able to make needs known.   Mood/Behavior: Calm, accepting, pleasant.  Cardiac: HTN.  Respiratory: Occasional dry cough. O2 sats stable in mid-90's on 3 LPM.   Diet: Regular diet, tolerating well.   GI/: Urinary incontinence; Purewick in place with cloudy jose output. Incontinent of stool, but no BM this shift. Denies nausea.   Skin: Blanchable redness in sirena area. Mild edema in BLE's. Pt turned and changed q2h.   Pain: Denies.     New this shift/Plan: Pt discharged back to LTC at 1700. Report given to RN at [facility], packet and scripts faxed and sent with pt.

## 2020-11-18 NOTE — PROGRESS NOTES
Brief Medicine Note    I certify that this patient, Ankita Pham has been under my care (or a nurse practitioner or physican's assistant working with me). This is the face-to-face encounter for oxygen medical necessity.      Ankita Pham is now in a chronic stable state and continues to require supplemental oxygen. Patient has continued oxygen desaturation due to COVID-19 infecton..    Alternative treatment(s) tried or considered and deemed clinically infective for treatment of COVID-19 infection include inhalers, steroids and pulmonary toileting.  If portability is ordered, is the patient mobile within the home? no    **Patients who qualify for home O2 coverage under the CMS guidelines require ABG tests or O2 sat readings obtained closest to, but no earlier than 2 days prior to the discharge, as evidence of the need for home oxygen therapy. Testing must be performed while patient is in the chronic stable state. See notes for O2 sats.**    Terese Mcclendon PA-C  Hospitalist Service  Pager [phone]

## 2020-11-19 ENCOUNTER — RECORDS - HEALTHEAST (OUTPATIENT)
Dept: LAB | Facility: CLINIC | Age: 85
End: 2020-11-19

## 2020-11-19 DIAGNOSIS — U07.1 2019 NOVEL CORONAVIRUS DISEASE (COVID-19): Primary | ICD-10-CM

## 2020-11-20 ENCOUNTER — PATIENT OUTREACH (OUTPATIENT)
Dept: CARE COORDINATION | Facility: CLINIC | Age: 85
End: 2020-11-20

## 2020-11-20 DIAGNOSIS — U07.1 2019 NOVEL CORONAVIRUS DISEASE (COVID-19): Primary | ICD-10-CM

## 2020-11-20 LAB
ALBUMIN SERPL-MCNC: 2.6 G/DL (ref 3.5–5)
ALP SERPL-CCNC: 55 U/L (ref 45–120)
ALT SERPL W P-5'-P-CCNC: 23 U/L (ref 0–45)
ANION GAP SERPL CALCULATED.3IONS-SCNC: 5 MMOL/L (ref 5–18)
AST SERPL W P-5'-P-CCNC: 19 U/L (ref 0–40)
BILIRUB SERPL-MCNC: 0.4 MG/DL (ref 0–1)
BUN SERPL-MCNC: 23 MG/DL (ref 8–28)
CALCIUM SERPL-MCNC: 8.2 MG/DL (ref 8.5–10.5)
CHLORIDE BLD-SCNC: 92 MMOL/L (ref 98–107)
CO2 SERPL-SCNC: 40 MMOL/L (ref 22–31)
CREAT SERPL-MCNC: 0.62 MG/DL (ref 0.6–1.1)
ERYTHROCYTE [DISTWIDTH] IN BLOOD BY AUTOMATED COUNT: 14.5 % (ref 11–14.5)
GFR SERPL CREATININE-BSD FRML MDRD: >60 ML/MIN/1.73M2
GLUCOSE BLD-MCNC: 84 MG/DL (ref 70–125)
HCT VFR BLD AUTO: 44.8 % (ref 35–47)
HGB BLD-MCNC: 14.2 G/DL (ref 12–16)
MAGNESIUM SERPL-MCNC: 1.9 MG/DL (ref 1.8–2.6)
MCH RBC QN AUTO: 31.6 PG (ref 27–34)
MCHC RBC AUTO-ENTMCNC: 31.7 G/DL (ref 32–36)
MCV RBC AUTO: 100 FL (ref 80–100)
MDC_IDC_MSMT_BATTERY_REMAINING_LONGEVITY: 12 MO
MDC_IDC_MSMT_CAP_CHARGE_TYPE: NORMAL
MDC_IDC_PG_IMPLANT_DTM: NORMAL
MDC_IDC_PG_MFG: NORMAL
MDC_IDC_PG_MODEL: NORMAL
MDC_IDC_PG_SERIAL: NORMAL
MDC_IDC_PG_TYPE: NORMAL
MDC_IDC_SESS_CLINIC_NAME: NORMAL
MDC_IDC_SESS_DTM: NORMAL
MDC_IDC_SESS_TYPE: NORMAL
MDC_IDC_SET_BRADY_AT_MODE_SWITCH_MODE: NORMAL
MDC_IDC_SET_BRADY_HYSTRATE: NORMAL
MDC_IDC_SET_BRADY_LOWRATE: 50 {BEATS}/MIN
MDC_IDC_SET_BRADY_MODE: NORMAL
MDC_IDC_SET_LEADCHNL_RA_PACING_POLARITY: NORMAL
MDC_IDC_SET_LEADCHNL_RA_SENSING_POLARITY: NORMAL
MDC_IDC_SET_LEADCHNL_RV_PACING_AMPLITUDE: 4 V
MDC_IDC_SET_LEADCHNL_RV_PACING_CAPTURE_MODE: NORMAL
MDC_IDC_SET_LEADCHNL_RV_PACING_POLARITY: NORMAL
MDC_IDC_SET_LEADCHNL_RV_PACING_PULSEWIDTH: 1 MS
MDC_IDC_SET_LEADCHNL_RV_SENSING_ADAPTATION_MODE: NORMAL
MDC_IDC_SET_LEADCHNL_RV_SENSING_POLARITY: NORMAL
MDC_IDC_SET_LEADCHNL_RV_SENSING_SENSITIVITY: 1 MV
MDC_IDC_STAT_BRADY_RV_PERCENT_PACED: 34 %
MDC_IDC_STAT_EPISODE_RECENT_COUNT: 0
MDC_IDC_STAT_EPISODE_RECENT_COUNT_DTM_END: NORMAL
MDC_IDC_STAT_EPISODE_RECENT_COUNT_DTM_START: NORMAL
MDC_IDC_STAT_EPISODE_TYPE: NORMAL
MDC_IDC_STAT_EPISODE_VENDOR_TYPE: NORMAL
PLATELET # BLD AUTO: 163 THOU/UL (ref 140–440)
PMV BLD AUTO: 11.4 FL (ref 8.5–12.5)
POTASSIUM BLD-SCNC: 4.5 MMOL/L (ref 3.5–5)
PROT SERPL-MCNC: 5.8 G/DL (ref 6–8)
RBC # BLD AUTO: 4.5 MILL/UL (ref 3.8–5.4)
SODIUM SERPL-SCNC: 137 MMOL/L (ref 136–145)
WBC: 6.9 THOU/UL (ref 4–11)

## 2020-11-20 NOTE — PROGRESS NOTES
Clinic Care Coordination Contact-No PCP    Clinic Care Coordination Contact     Follow Up Progress Note      Reason for Referral:      Intervention/Education provided during outreach: CHW spoke with patient. Reviewed Hospital COVID discharge instructions. Patient DID NOT accept assistance from CHW to schedule PCP follow up appointment with PCP at [facility].      Plan: Patient is in a nursing home and receiving her care there.     No further Outreach will be done at this time.

## 2020-11-23 LAB
BACTERIA SPEC CULT: ABNORMAL
BACTERIA SPEC CULT: ABNORMAL
Lab: ABNORMAL
SPECIMEN SOURCE: ABNORMAL

## 2020-11-24 DIAGNOSIS — I49.5 SICK SINUS SYNDROME (H): Primary | ICD-10-CM

## 2020-11-25 ENCOUNTER — DOCUMENTATION ONLY (OUTPATIENT)
Dept: OTHER | Facility: CLINIC | Age: 85
End: 2020-11-25

## 2020-11-25 ENCOUNTER — AMBULATORY - HEALTHEAST (OUTPATIENT)
Dept: OTHER | Facility: CLINIC | Age: 85
End: 2020-11-25

## 2020-12-23 ENCOUNTER — ANCILLARY PROCEDURE (OUTPATIENT)
Dept: CARDIOLOGY | Facility: CLINIC | Age: 85
End: 2020-12-23
Attending: INTERNAL MEDICINE
Payer: COMMERCIAL

## 2020-12-23 DIAGNOSIS — I49.5 SICK SINUS SYNDROME (H): Primary | ICD-10-CM

## 2020-12-23 DIAGNOSIS — I49.5 SICK SINUS SYNDROME (H): ICD-10-CM

## 2021-01-15 ENCOUNTER — HEALTH MAINTENANCE LETTER (OUTPATIENT)
Age: 86
End: 2021-01-15

## 2021-01-22 ENCOUNTER — ANCILLARY PROCEDURE (OUTPATIENT)
Dept: CARDIOLOGY | Facility: CLINIC | Age: 86
End: 2021-01-22
Attending: INTERNAL MEDICINE
Payer: COMMERCIAL

## 2021-01-22 DIAGNOSIS — I49.5 SICK SINUS SYNDROME (H): ICD-10-CM

## 2021-03-02 ENCOUNTER — ANCILLARY PROCEDURE (OUTPATIENT)
Dept: CARDIOLOGY | Facility: CLINIC | Age: 86
End: 2021-03-02
Attending: INTERNAL MEDICINE
Payer: COMMERCIAL

## 2021-03-02 DIAGNOSIS — I49.5 SICK SINUS SYNDROME (H): Primary | ICD-10-CM

## 2021-03-02 DIAGNOSIS — Z95.0 CARDIAC PACEMAKER IN SITU: ICD-10-CM

## 2021-03-02 PROCEDURE — 93293 PM PHONE R-STRIP DEVICE EVAL: CPT | Performed by: INTERNAL MEDICINE

## 2021-03-06 ENCOUNTER — RECORDS - HEALTHEAST (OUTPATIENT)
Dept: LAB | Facility: CLINIC | Age: 86
End: 2021-03-06

## 2021-03-08 LAB
ANION GAP SERPL CALCULATED.3IONS-SCNC: 8 MMOL/L (ref 5–18)
BUN SERPL-MCNC: 25 MG/DL (ref 8–28)
CALCIUM SERPL-MCNC: 8.3 MG/DL (ref 8.5–10.5)
CHLORIDE BLD-SCNC: 100 MMOL/L (ref 98–107)
CO2 SERPL-SCNC: 33 MMOL/L (ref 22–31)
CREAT SERPL-MCNC: 0.64 MG/DL (ref 0.6–1.1)
GFR SERPL CREATININE-BSD FRML MDRD: >60 ML/MIN/1.73M2
GLUCOSE BLD-MCNC: 93 MG/DL (ref 70–125)
POTASSIUM BLD-SCNC: 3.6 MMOL/L (ref 3.5–5)
SODIUM SERPL-SCNC: 141 MMOL/L (ref 136–145)

## 2021-03-30 ENCOUNTER — ANCILLARY PROCEDURE (OUTPATIENT)
Dept: CARDIOLOGY | Facility: CLINIC | Age: 86
End: 2021-03-30
Attending: INTERNAL MEDICINE
Payer: COMMERCIAL

## 2021-03-30 DIAGNOSIS — I49.5 SICK SINUS SYNDROME (H): ICD-10-CM

## 2021-03-30 DIAGNOSIS — I49.5 SICK SINUS SYNDROME (H): Primary | ICD-10-CM

## 2021-04-06 ENCOUNTER — RECORDS - HEALTHEAST (OUTPATIENT)
Dept: LAB | Facility: CLINIC | Age: 86
End: 2021-04-06

## 2021-04-07 LAB
ANION GAP SERPL CALCULATED.3IONS-SCNC: 10 MMOL/L (ref 5–18)
BUN SERPL-MCNC: 27 MG/DL (ref 8–28)
CALCIUM SERPL-MCNC: 8.3 MG/DL (ref 8.5–10.5)
CHLORIDE BLD-SCNC: 101 MMOL/L (ref 98–107)
CO2 SERPL-SCNC: 32 MMOL/L (ref 22–31)
CREAT SERPL-MCNC: 0.72 MG/DL (ref 0.6–1.1)
GFR SERPL CREATININE-BSD FRML MDRD: >60 ML/MIN/1.73M2
GLUCOSE BLD-MCNC: 79 MG/DL (ref 70–125)
POTASSIUM BLD-SCNC: 3.6 MMOL/L (ref 3.5–5)
SODIUM SERPL-SCNC: 143 MMOL/L (ref 136–145)

## 2021-04-13 ENCOUNTER — RECORDS - HEALTHEAST (OUTPATIENT)
Dept: LAB | Facility: CLINIC | Age: 86
End: 2021-04-13

## 2021-04-14 LAB
BASOPHILS # BLD AUTO: 0 THOU/UL (ref 0–0.2)
BASOPHILS NFR BLD AUTO: 1 % (ref 0–2)
EOSINOPHIL # BLD AUTO: 0.2 THOU/UL (ref 0–0.4)
EOSINOPHIL NFR BLD AUTO: 2 % (ref 0–6)
ERYTHROCYTE [DISTWIDTH] IN BLOOD BY AUTOMATED COUNT: 13.2 % (ref 11–14.5)
HCT VFR BLD AUTO: 38.6 % (ref 35–47)
HGB BLD-MCNC: 12.3 G/DL (ref 12–16)
IMM GRANULOCYTES # BLD: 0.1 THOU/UL
IMM GRANULOCYTES NFR BLD: 1 %
LYMPHOCYTES # BLD AUTO: 1.6 THOU/UL (ref 0.8–4.4)
LYMPHOCYTES NFR BLD AUTO: 25 % (ref 20–40)
MCH RBC QN AUTO: 33 PG (ref 27–34)
MCHC RBC AUTO-ENTMCNC: 31.9 G/DL (ref 32–36)
MCV RBC AUTO: 104 FL (ref 80–100)
MONOCYTES # BLD AUTO: 0.6 THOU/UL (ref 0–0.9)
MONOCYTES NFR BLD AUTO: 10 % (ref 2–10)
NEUTROPHILS # BLD AUTO: 4.1 THOU/UL (ref 2–7.7)
NEUTROPHILS NFR BLD AUTO: 62 % (ref 50–70)
PLATELET # BLD AUTO: 214 THOU/UL (ref 140–440)
PMV BLD AUTO: 10.6 FL (ref 8.5–12.5)
RBC # BLD AUTO: 3.73 MILL/UL (ref 3.8–5.4)
WBC: 6.6 THOU/UL (ref 4–11)

## 2021-04-19 ENCOUNTER — RECORDS - HEALTHEAST (OUTPATIENT)
Dept: LAB | Facility: CLINIC | Age: 86
End: 2021-04-19

## 2021-04-20 LAB
ANION GAP SERPL CALCULATED.3IONS-SCNC: 9 MMOL/L (ref 5–18)
BUN SERPL-MCNC: 33 MG/DL (ref 8–28)
CALCIUM SERPL-MCNC: 8.6 MG/DL (ref 8.5–10.5)
CHLORIDE BLD-SCNC: 99 MMOL/L (ref 98–107)
CO2 SERPL-SCNC: 34 MMOL/L (ref 22–31)
CREAT SERPL-MCNC: 0.76 MG/DL (ref 0.6–1.1)
GFR SERPL CREATININE-BSD FRML MDRD: >60 ML/MIN/1.73M2
GLUCOSE BLD-MCNC: 91 MG/DL (ref 70–125)
POTASSIUM BLD-SCNC: 3.7 MMOL/L (ref 3.5–5)
SODIUM SERPL-SCNC: 142 MMOL/L (ref 136–145)

## 2021-04-27 ENCOUNTER — ANCILLARY PROCEDURE (OUTPATIENT)
Dept: CARDIOLOGY | Facility: CLINIC | Age: 86
End: 2021-04-27
Attending: INTERNAL MEDICINE
Payer: COMMERCIAL

## 2021-04-27 DIAGNOSIS — I49.5 SICK SINUS SYNDROME (H): ICD-10-CM

## 2021-05-27 ENCOUNTER — ANCILLARY PROCEDURE (OUTPATIENT)
Dept: CARDIOLOGY | Facility: CLINIC | Age: 86
End: 2021-05-27
Attending: INTERNAL MEDICINE
Payer: COMMERCIAL

## 2021-05-27 DIAGNOSIS — Z95.0 CARDIAC PACEMAKER IN SITU: ICD-10-CM

## 2021-06-30 ENCOUNTER — ANCILLARY PROCEDURE (OUTPATIENT)
Dept: CARDIOLOGY | Facility: CLINIC | Age: 86
End: 2021-06-30
Attending: INTERNAL MEDICINE
Payer: COMMERCIAL

## 2021-06-30 DIAGNOSIS — Z95.0 CARDIAC PACEMAKER IN SITU: ICD-10-CM

## 2021-06-30 PROCEDURE — 93293 PM PHONE R-STRIP DEVICE EVAL: CPT | Performed by: INTERNAL MEDICINE

## 2021-08-05 ENCOUNTER — ANCILLARY PROCEDURE (OUTPATIENT)
Dept: CARDIOLOGY | Facility: CLINIC | Age: 86
End: 2021-08-05
Attending: INTERNAL MEDICINE
Payer: COMMERCIAL

## 2021-08-05 DIAGNOSIS — Z95.0 CARDIAC PACEMAKER IN SITU: ICD-10-CM

## 2021-09-04 ENCOUNTER — HEALTH MAINTENANCE LETTER (OUTPATIENT)
Age: 86
End: 2021-09-04

## 2021-09-10 ENCOUNTER — ANCILLARY PROCEDURE (OUTPATIENT)
Dept: CARDIOLOGY | Facility: CLINIC | Age: 86
End: 2021-09-10
Attending: INTERNAL MEDICINE
Payer: COMMERCIAL

## 2021-09-10 DIAGNOSIS — Z95.0 CARDIAC PACEMAKER IN SITU: ICD-10-CM

## 2021-10-22 ENCOUNTER — ANCILLARY PROCEDURE (OUTPATIENT)
Dept: CARDIOLOGY | Facility: CLINIC | Age: 86
End: 2021-10-22
Attending: INTERNAL MEDICINE
Payer: COMMERCIAL

## 2021-10-22 DIAGNOSIS — Z95.0 CARDIAC PACEMAKER IN SITU: ICD-10-CM

## 2021-11-02 ENCOUNTER — LAB REQUISITION (OUTPATIENT)
Dept: LAB | Facility: CLINIC | Age: 86
End: 2021-11-02
Payer: COMMERCIAL

## 2021-11-02 DIAGNOSIS — I50.23 ACUTE ON CHRONIC SYSTOLIC (CONGESTIVE) HEART FAILURE (H): ICD-10-CM

## 2021-11-03 LAB
ANION GAP SERPL CALCULATED.3IONS-SCNC: 8 MMOL/L (ref 5–18)
BUN SERPL-MCNC: 25 MG/DL (ref 8–28)
CALCIUM SERPL-MCNC: 9.4 MG/DL (ref 8.5–10.5)
CHLORIDE BLD-SCNC: 101 MMOL/L (ref 98–107)
CO2 SERPL-SCNC: 30 MMOL/L (ref 22–31)
CREAT SERPL-MCNC: 0.76 MG/DL (ref 0.6–1.1)
GFR SERPL CREATININE-BSD FRML MDRD: 65 ML/MIN/1.73M2
GLUCOSE BLD-MCNC: 102 MG/DL (ref 70–125)
POTASSIUM BLD-SCNC: 4.3 MMOL/L (ref 3.5–5)
SODIUM SERPL-SCNC: 139 MMOL/L (ref 136–145)
TSH SERPL DL<=0.005 MIU/L-ACNC: 0.57 UIU/ML (ref 0.3–5)

## 2021-11-03 PROCEDURE — 80048 BASIC METABOLIC PNL TOTAL CA: CPT | Mod: ORL | Performed by: NURSE PRACTITIONER

## 2021-11-03 PROCEDURE — 36415 COLL VENOUS BLD VENIPUNCTURE: CPT | Mod: ORL | Performed by: NURSE PRACTITIONER

## 2021-11-03 PROCEDURE — 84443 ASSAY THYROID STIM HORMONE: CPT | Mod: ORL | Performed by: NURSE PRACTITIONER

## 2021-11-03 PROCEDURE — P9604 ONE-WAY ALLOW PRORATED TRIP: HCPCS | Mod: ORL | Performed by: NURSE PRACTITIONER

## 2021-12-01 ENCOUNTER — ANCILLARY PROCEDURE (OUTPATIENT)
Dept: CARDIOLOGY | Facility: CLINIC | Age: 86
End: 2021-12-01
Attending: INTERNAL MEDICINE
Payer: COMMERCIAL

## 2021-12-01 DIAGNOSIS — Z95.0 CARDIAC PACEMAKER IN SITU: ICD-10-CM

## 2021-12-01 PROCEDURE — 93293 PM PHONE R-STRIP DEVICE EVAL: CPT | Performed by: INTERNAL MEDICINE

## 2022-01-11 ENCOUNTER — ANCILLARY PROCEDURE (OUTPATIENT)
Dept: CARDIOLOGY | Facility: CLINIC | Age: 87
End: 2022-01-11
Attending: INTERNAL MEDICINE
Payer: COMMERCIAL

## 2022-01-11 DIAGNOSIS — Z95.0 CARDIAC PACEMAKER IN SITU: ICD-10-CM

## 2022-02-16 ENCOUNTER — ANCILLARY PROCEDURE (OUTPATIENT)
Dept: CARDIOLOGY | Facility: CLINIC | Age: 87
End: 2022-02-16
Attending: INTERNAL MEDICINE
Payer: COMMERCIAL

## 2022-02-16 DIAGNOSIS — Z95.0 CARDIAC PACEMAKER IN SITU: ICD-10-CM

## 2022-02-19 ENCOUNTER — HEALTH MAINTENANCE LETTER (OUTPATIENT)
Age: 87
End: 2022-02-19

## 2022-03-09 ENCOUNTER — LAB REQUISITION (OUTPATIENT)
Dept: LAB | Facility: CLINIC | Age: 87
End: 2022-03-09
Payer: COMMERCIAL

## 2022-03-09 DIAGNOSIS — J96.11 CHRONIC RESPIRATORY FAILURE WITH HYPOXIA (H): ICD-10-CM

## 2022-03-09 DIAGNOSIS — R79.89 OTHER SPECIFIED ABNORMAL FINDINGS OF BLOOD CHEMISTRY: ICD-10-CM

## 2022-03-09 LAB
ANION GAP SERPL CALCULATED.3IONS-SCNC: 13 MMOL/L (ref 5–18)
BUN SERPL-MCNC: 22 MG/DL (ref 8–28)
CALCIUM SERPL-MCNC: 8.7 MG/DL (ref 8.5–10.5)
CHLORIDE BLD-SCNC: 98 MMOL/L (ref 98–107)
CO2 SERPL-SCNC: 31 MMOL/L (ref 22–31)
CREAT SERPL-MCNC: 0.72 MG/DL (ref 0.6–1.1)
ERYTHROCYTE [DISTWIDTH] IN BLOOD BY AUTOMATED COUNT: 15.1 % (ref 10–15)
GFR SERPL CREATININE-BSD FRML MDRD: 74 ML/MIN/1.73M2
GLUCOSE BLD-MCNC: 101 MG/DL (ref 70–125)
HCT VFR BLD AUTO: 41.2 % (ref 35–47)
HGB BLD-MCNC: 13.4 G/DL (ref 11.7–15.7)
MCH RBC QN AUTO: 33.5 PG (ref 26.5–33)
MCHC RBC AUTO-ENTMCNC: 32.5 G/DL (ref 31.5–36.5)
MCV RBC AUTO: 103 FL (ref 78–100)
PLATELET # BLD AUTO: 217 10E3/UL (ref 150–450)
POTASSIUM BLD-SCNC: 3.4 MMOL/L (ref 3.5–5)
RBC # BLD AUTO: 4 10E6/UL (ref 3.8–5.2)
SODIUM SERPL-SCNC: 142 MMOL/L (ref 136–145)
TSH SERPL DL<=0.005 MIU/L-ACNC: 1.24 UIU/ML (ref 0.3–5)
WBC # BLD AUTO: 7.6 10E3/UL (ref 4–11)

## 2022-03-09 PROCEDURE — P9604 ONE-WAY ALLOW PRORATED TRIP: HCPCS | Mod: ORL | Performed by: NURSE PRACTITIONER

## 2022-03-09 PROCEDURE — 84443 ASSAY THYROID STIM HORMONE: CPT | Mod: ORL | Performed by: NURSE PRACTITIONER

## 2022-03-09 PROCEDURE — 36415 COLL VENOUS BLD VENIPUNCTURE: CPT | Mod: ORL | Performed by: NURSE PRACTITIONER

## 2022-03-09 PROCEDURE — 80048 BASIC METABOLIC PNL TOTAL CA: CPT | Mod: ORL | Performed by: NURSE PRACTITIONER

## 2022-03-09 PROCEDURE — 85027 COMPLETE CBC AUTOMATED: CPT | Mod: ORL | Performed by: NURSE PRACTITIONER

## 2022-03-30 ENCOUNTER — ANCILLARY PROCEDURE (OUTPATIENT)
Dept: CARDIOLOGY | Facility: CLINIC | Age: 87
End: 2022-03-30
Attending: INTERNAL MEDICINE
Payer: COMMERCIAL

## 2022-03-30 ENCOUNTER — TELEPHONE (OUTPATIENT)
Dept: CARDIOLOGY | Facility: CLINIC | Age: 87
End: 2022-03-30

## 2022-03-30 DIAGNOSIS — Z95.0 CARDIAC PACEMAKER IN SITU: ICD-10-CM

## 2022-03-30 DIAGNOSIS — I49.5 SICK SINUS SYNDROME (H): Primary | ICD-10-CM

## 2022-03-30 NOTE — TELEPHONE ENCOUNTER
"Patient had a phone TTM today which showed that her device had triggered IESHA. No IESHA date is available on a TTM. She had a rep check done at her care facility on 2/21/22 and was not yet IESHA at that time so she triggered after 2/21/22.           PPM was put in for SSS in 2007 (this was a gen change, original device and leads were 1999). Pt has now developed chronic AF. Patients device is programmed VVI 50 and last check on 2/21/22 showed  of 16%. See lead measurements from that check below. Warfarin DC'd 7/2016 after epidural hematoma with supratherapeutic INR.           I spoke with patient's RN at her care facility. He states that she is \"totally dependent\" for cares but would be able to come in with a wheel chair and transport services if needed.     Will route to Dr. Henning to advise.     "

## 2022-03-30 NOTE — TELEPHONE ENCOUNTER
PM gen replacement may not be necessary after she developed permanent AF.  Any idea how much she is using her PM?  The most recent 10+ device checks have been for battery checks, not that useful for anything else...    Can her family coordinate a video visit (Doximity) with us?    SURESH

## 2022-03-31 NOTE — TELEPHONE ENCOUNTER
Patient had a check done by [facility] at her living facility on 2/21/22. This check showed that she is v paced 16% of the time since the previous check on 12/4/19.     Left voicemail for patient's son Chandu asking that he call back to discuss patient's pacemaker and plan.

## 2022-03-31 NOTE — TELEPHONE ENCOUNTER
"Received return call from patient's son Chandu. Reviewed that Ankita's pacemaker is at IESHA and what this means regarding battery life of her device.   We also briefly discussed that she no longer has her initial indication for pacemaker placement as she is in chronic AF. Discussed plan for a video (doximity) visit with Dr. Henning to discuss this further. Chandu states agreement with this plan.     Discussed patient's mobility with Chandu as well. He states that Ankita is bed bound. He feels that she is physically capable of getting to a wheel chair but due to \"pysch issues\" she is very resistant to this and screams in resistance. He states she is only able to get out of her living facility by ambulance transport.      Placed order for doximity visit with Dr. Henning. Gave Chandu the number to contact Vidhya in scheduling. He states that he will call later today to set this up.   "

## 2022-04-07 ENCOUNTER — VIRTUAL VISIT (OUTPATIENT)
Dept: CARDIOLOGY | Facility: CLINIC | Age: 87
End: 2022-04-07
Attending: INTERNAL MEDICINE
Payer: COMMERCIAL

## 2022-04-07 DIAGNOSIS — Z45.010 PACEMAKER BATTERY DEPLETION: Primary | ICD-10-CM

## 2022-04-07 DIAGNOSIS — I48.21 PERMANENT ATRIAL FIBRILLATION (H): ICD-10-CM

## 2022-04-07 PROCEDURE — 99213 OFFICE O/P EST LOW 20 MIN: CPT | Mod: 95 | Performed by: INTERNAL MEDICINE

## 2022-04-07 NOTE — PROGRESS NOTES
PHYSICIAN NOTE:  This visit was completed via video due to COVID-19 precautions.  The patient provided consent for a video visit.      I had the pleasure evaluating Ms. Pham for pacemaker generator reaching IESHA.      I did not directly speak to the patient today.  She is a 89-year-old female resident at [facility] nursing home facility.  She has been bedridden for years.  Today I spoke to her son, Mr. Chandu Pham who is is her medical POA.    Mr. Pham indicated that his mother remains relatively lucid but has been physically disabled for years.  She is bedridden except when taking by the staff for bathing.  Air mattress are being used to prevent bed sores.  She is DNR.  If there is an acute medical need, the patient needs to be transported by ambulance to a medical facility.    She received her first pacemaker over 20 years ago.  The initial indication was symptomatic sinus node dysfunction.  The device was most recently replaced in 2007.  The son recalls that it was perhaps replaced once more in the past.  The current device is a Guidant Insignia 1.  Our device clinic has been monitoring the pacemaker on a regular basis and it was recently determined it reached IESHA.    Several years ago the patient developed atrial fibrillation that is now considered permanent.  After this change in rhythm the patient has been using the pacemaker sparingly, typically ventricular pacing is <20%.  She is not pacemaker-dependent.    Given her clinical picture at this time, I do not recommend device replacement.  I discussed with her son that the risk of the procedure would significantly outweigh potential benefit.    He is in agreement with this and will discuss my recommendation with his mother.  He knows how to reach us as needed.    It was my pleasure speaking to Mr. Pham today.      Poli Henning MD, [facility]        Video visit documentation  Start: 2:22 PM  End: 2:41 PM  Application:  DoxVenueJam  Patient location: Her son was  at his home  Provider location: [facility]              Ankita is a 89 year old who is being evaluated via a billable video visit.      Patient will not be at the visit. Please speak with the patient's son and medical POA, Chandu Quispeyobany, at [phone].      Barbie Aguero LPN

## 2022-10-19 NOTE — TELEPHONE ENCOUNTER
Please stop metoprolol (if she is still on it) and send device rep at NH to see if V-capture is still possible (not urgent).    Thx, DI   Hydroxychloroquine Pregnancy And Lactation Text: This medication has been shown to cause fetal harm but it isn't assigned a Pregnancy Risk Category. There are small amounts excreted in breast milk.

## 2022-10-22 ENCOUNTER — HEALTH MAINTENANCE LETTER (OUTPATIENT)
Age: 87
End: 2022-10-22

## 2022-11-14 ENCOUNTER — LAB REQUISITION (OUTPATIENT)
Dept: LAB | Facility: CLINIC | Age: 87
End: 2022-11-14
Payer: COMMERCIAL

## 2022-11-14 DIAGNOSIS — I50.9 HEART FAILURE, UNSPECIFIED (H): ICD-10-CM

## 2022-11-14 DIAGNOSIS — E03.9 HYPOTHYROIDISM, UNSPECIFIED: ICD-10-CM

## 2022-11-15 ENCOUNTER — LAB REQUISITION (OUTPATIENT)
Dept: LAB | Facility: CLINIC | Age: 87
End: 2022-11-15
Payer: COMMERCIAL

## 2022-11-15 DIAGNOSIS — E03.9 HYPOTHYROIDISM, UNSPECIFIED: ICD-10-CM

## 2022-11-15 DIAGNOSIS — E20.9 HYPOPARATHYROIDISM, UNSPECIFIED (H): ICD-10-CM

## 2022-11-16 LAB
ANION GAP SERPL CALCULATED.3IONS-SCNC: 11 MMOL/L (ref 7–15)
BUN SERPL-MCNC: 23.8 MG/DL (ref 8–23)
CALCIUM SERPL-MCNC: 8.5 MG/DL (ref 8.2–9.6)
CHLORIDE SERPL-SCNC: 99 MMOL/L (ref 98–107)
CREAT SERPL-MCNC: 0.69 MG/DL (ref 0.51–0.95)
DEPRECATED HCO3 PLAS-SCNC: 30 MMOL/L (ref 22–29)
GFR SERPL CREATININE-BSD FRML MDRD: 77 ML/MIN/1.73M2
GLUCOSE SERPL-MCNC: 92 MG/DL (ref 70–99)
MAGNESIUM SERPL-MCNC: 1.9 MG/DL (ref 1.7–2.3)
POTASSIUM SERPL-SCNC: 3.8 MMOL/L (ref 3.4–5.3)
SODIUM SERPL-SCNC: 140 MMOL/L (ref 136–145)
TSH SERPL DL<=0.005 MIU/L-ACNC: 0.58 UIU/ML (ref 0.3–4.2)

## 2022-11-16 PROCEDURE — P9604 ONE-WAY ALLOW PRORATED TRIP: HCPCS | Mod: ORL | Performed by: NURSE PRACTITIONER

## 2022-11-16 PROCEDURE — 80048 BASIC METABOLIC PNL TOTAL CA: CPT | Mod: ORL | Performed by: NURSE PRACTITIONER

## 2022-11-16 PROCEDURE — 84443 ASSAY THYROID STIM HORMONE: CPT | Mod: ORL | Performed by: NURSE PRACTITIONER

## 2022-11-16 PROCEDURE — 36415 COLL VENOUS BLD VENIPUNCTURE: CPT | Mod: ORL | Performed by: NURSE PRACTITIONER

## 2022-11-16 PROCEDURE — 83735 ASSAY OF MAGNESIUM: CPT | Mod: ORL | Performed by: NURSE PRACTITIONER

## 2022-12-29 ENCOUNTER — LAB REQUISITION (OUTPATIENT)
Dept: LAB | Facility: CLINIC | Age: 87
End: 2022-12-29
Payer: COMMERCIAL

## 2022-12-29 DIAGNOSIS — E03.9 HYPOTHYROIDISM, UNSPECIFIED: ICD-10-CM

## 2022-12-30 LAB — TSH SERPL DL<=0.005 MIU/L-ACNC: 1.37 UIU/ML (ref 0.3–4.2)

## 2022-12-30 PROCEDURE — 84443 ASSAY THYROID STIM HORMONE: CPT | Mod: ORL | Performed by: NURSE PRACTITIONER

## 2022-12-30 PROCEDURE — 36415 COLL VENOUS BLD VENIPUNCTURE: CPT | Mod: ORL | Performed by: NURSE PRACTITIONER

## 2022-12-30 PROCEDURE — P9603 ONE-WAY ALLOW PRORATED MILES: HCPCS | Mod: ORL | Performed by: NURSE PRACTITIONER

## 2023-01-01 ENCOUNTER — LAB REQUISITION (OUTPATIENT)
Dept: LAB | Facility: CLINIC | Age: 88
End: 2023-01-01
Payer: COMMERCIAL

## 2023-01-01 ENCOUNTER — HEALTH MAINTENANCE LETTER (OUTPATIENT)
Age: 88
End: 2023-01-01

## 2023-01-01 DIAGNOSIS — I50.9 HEART FAILURE, UNSPECIFIED (H): ICD-10-CM

## 2023-01-01 LAB
ANION GAP SERPL CALCULATED.3IONS-SCNC: 10 MMOL/L (ref 7–15)
BUN SERPL-MCNC: 19.8 MG/DL (ref 8–23)
CALCIUM SERPL-MCNC: 8.9 MG/DL (ref 8.2–9.6)
CHLORIDE SERPL-SCNC: 99 MMOL/L (ref 98–107)
CREAT SERPL-MCNC: 0.6 MG/DL (ref 0.51–0.95)
DEPRECATED HCO3 PLAS-SCNC: 33 MMOL/L (ref 22–29)
GFR SERPL CREATININE-BSD FRML MDRD: 79 ML/MIN/1.73M2
GLUCOSE SERPL-MCNC: 91 MG/DL (ref 70–99)
POTASSIUM SERPL-SCNC: 3.9 MMOL/L (ref 3.4–5.3)
SODIUM SERPL-SCNC: 142 MMOL/L (ref 136–145)

## 2023-01-01 PROCEDURE — P9604 ONE-WAY ALLOW PRORATED TRIP: HCPCS | Mod: ORL | Performed by: NURSE PRACTITIONER

## 2023-01-01 PROCEDURE — 36415 COLL VENOUS BLD VENIPUNCTURE: CPT | Mod: ORL | Performed by: NURSE PRACTITIONER

## 2023-01-01 PROCEDURE — 80048 BASIC METABOLIC PNL TOTAL CA: CPT | Mod: ORL | Performed by: NURSE PRACTITIONER
